# Patient Record
Sex: FEMALE | Race: WHITE | NOT HISPANIC OR LATINO | Employment: UNEMPLOYED | URBAN - METROPOLITAN AREA
[De-identification: names, ages, dates, MRNs, and addresses within clinical notes are randomized per-mention and may not be internally consistent; named-entity substitution may affect disease eponyms.]

---

## 2017-04-04 ENCOUNTER — APPOINTMENT (OUTPATIENT)
Dept: GENERAL RADIOLOGY | Facility: CLINIC | Age: 37
End: 2017-04-04
Attending: FAMILY MEDICINE
Payer: COMMERCIAL

## 2017-04-04 ENCOUNTER — HOSPITAL ENCOUNTER (EMERGENCY)
Facility: CLINIC | Age: 37
Discharge: HOME | End: 2017-04-04
Attending: FAMILY MEDICINE | Admitting: FAMILY MEDICINE
Payer: COMMERCIAL

## 2017-04-04 ENCOUNTER — OFFICE VISIT (OUTPATIENT)
Dept: FAMILY MEDICINE | Facility: CLINIC | Age: 37
End: 2017-04-04
Payer: COMMERCIAL

## 2017-04-04 VITALS
WEIGHT: 144 LBS | DIASTOLIC BLOOD PRESSURE: 86 MMHG | HEART RATE: 86 BPM | SYSTOLIC BLOOD PRESSURE: 127 MMHG | OXYGEN SATURATION: 98 % | TEMPERATURE: 98.4 F | RESPIRATION RATE: 20 BRPM | BODY MASS INDEX: 24.59 KG/M2 | HEIGHT: 64 IN

## 2017-04-04 VITALS
HEART RATE: 92 BPM | BODY MASS INDEX: 24.52 KG/M2 | SYSTOLIC BLOOD PRESSURE: 123 MMHG | DIASTOLIC BLOOD PRESSURE: 70 MMHG | WEIGHT: 143.6 LBS | HEIGHT: 64 IN | TEMPERATURE: 97.4 F

## 2017-04-04 DIAGNOSIS — E03.9 HYPOTHYROIDISM, UNSPECIFIED TYPE: ICD-10-CM

## 2017-04-04 DIAGNOSIS — R11.0 NAUSEA: ICD-10-CM

## 2017-04-04 DIAGNOSIS — E86.0 DEHYDRATION: ICD-10-CM

## 2017-04-04 DIAGNOSIS — K52.9 GASTROENTERITIS: Primary | ICD-10-CM

## 2017-04-04 DIAGNOSIS — N39.0 URINARY TRACT INFECTION WITHOUT HEMATURIA, SITE UNSPECIFIED: ICD-10-CM

## 2017-04-04 LAB
ALBUMIN SERPL-MCNC: 3.8 G/DL (ref 3.4–5)
ALBUMIN UR-MCNC: NEGATIVE MG/DL
ALP SERPL-CCNC: 81 U/L (ref 40–150)
ALT SERPL W P-5'-P-CCNC: 43 U/L (ref 0–50)
ANION GAP SERPL CALCULATED.3IONS-SCNC: 7 MMOL/L (ref 3–14)
APPEARANCE UR: ABNORMAL
AST SERPL W P-5'-P-CCNC: 29 U/L (ref 0–45)
BACTERIA #/AREA URNS HPF: ABNORMAL /HPF
BASOPHILS # BLD AUTO: 0 10E9/L (ref 0–0.2)
BASOPHILS NFR BLD AUTO: 0.8 %
BILIRUB SERPL-MCNC: 0.6 MG/DL (ref 0.2–1.3)
BILIRUB UR QL STRIP: NEGATIVE
BUN SERPL-MCNC: 9 MG/DL (ref 7–30)
CALCIUM SERPL-MCNC: 8.6 MG/DL (ref 8.5–10.1)
CHLORIDE SERPL-SCNC: 105 MMOL/L (ref 94–109)
CO2 SERPL-SCNC: 29 MMOL/L (ref 20–32)
COLOR UR AUTO: ABNORMAL
CREAT SERPL-MCNC: 0.7 MG/DL (ref 0.52–1.04)
DIFFERENTIAL METHOD BLD: ABNORMAL
EOSINOPHIL # BLD AUTO: 0 10E9/L (ref 0–0.7)
EOSINOPHIL NFR BLD AUTO: 0.6 %
ERYTHROCYTE [DISTWIDTH] IN BLOOD BY AUTOMATED COUNT: 12.5 % (ref 10–15)
GFR SERPL CREATININE-BSD FRML MDRD: NORMAL ML/MIN/1.7M2
GLUCOSE SERPL-MCNC: 85 MG/DL (ref 70–99)
GLUCOSE UR STRIP-MCNC: NEGATIVE MG/DL
HCG UR QL: NEGATIVE
HCT VFR BLD AUTO: 42.7 % (ref 35–47)
HGB BLD-MCNC: 14.7 G/DL (ref 11.7–15.7)
HGB UR QL STRIP: NEGATIVE
IMM GRANULOCYTES # BLD: 0 10E9/L (ref 0–0.4)
IMM GRANULOCYTES NFR BLD: 0.3 %
KETONES UR STRIP-MCNC: NEGATIVE MG/DL
LEUKOCYTE ESTERASE UR QL STRIP: ABNORMAL
LIPASE SERPL-CCNC: 143 U/L (ref 73–393)
LYMPHOCYTES # BLD AUTO: 1 10E9/L (ref 0.8–5.3)
LYMPHOCYTES NFR BLD AUTO: 27 %
MCH RBC QN AUTO: 28.7 PG (ref 26.5–33)
MCHC RBC AUTO-ENTMCNC: 34.4 G/DL (ref 31.5–36.5)
MCV RBC AUTO: 83 FL (ref 78–100)
MONOCYTES # BLD AUTO: 0.6 10E9/L (ref 0–1.3)
MONOCYTES NFR BLD AUTO: 16.1 %
MUCOUS THREADS #/AREA URNS LPF: PRESENT /LPF
NEUTROPHILS # BLD AUTO: 2 10E9/L (ref 1.6–8.3)
NEUTROPHILS NFR BLD AUTO: 55.2 %
NITRATE UR QL: NEGATIVE
PH UR STRIP: 5.5 PH (ref 5–7)
PLATELET # BLD AUTO: 141 10E9/L (ref 150–450)
POTASSIUM SERPL-SCNC: 4.2 MMOL/L (ref 3.4–5.3)
PROT SERPL-MCNC: 7.7 G/DL (ref 6.8–8.8)
RBC # BLD AUTO: 5.13 10E12/L (ref 3.8–5.2)
RBC #/AREA URNS AUTO: 3 /HPF (ref 0–2)
SODIUM SERPL-SCNC: 141 MMOL/L (ref 133–144)
SP GR UR STRIP: 1.01 (ref 1–1.03)
SQUAMOUS #/AREA URNS AUTO: 86 /HPF (ref 0–1)
TSH SERPL DL<=0.05 MIU/L-ACNC: 8.43 MU/L (ref 0.4–4)
URN SPEC COLLECT METH UR: ABNORMAL
UROBILINOGEN UR STRIP-MCNC: NORMAL MG/DL (ref 0–2)
WBC # BLD AUTO: 3.6 10E9/L (ref 4–11)
WBC #/AREA URNS AUTO: 18 /HPF (ref 0–2)

## 2017-04-04 PROCEDURE — 84443 ASSAY THYROID STIM HORMONE: CPT | Performed by: FAMILY MEDICINE

## 2017-04-04 PROCEDURE — 99213 OFFICE O/P EST LOW 20 MIN: CPT | Performed by: FAMILY MEDICINE

## 2017-04-04 PROCEDURE — 99284 EMERGENCY DEPT VISIT MOD MDM: CPT | Mod: 25

## 2017-04-04 PROCEDURE — 25000125 ZZHC RX 250: Performed by: EMERGENCY MEDICINE

## 2017-04-04 PROCEDURE — 96361 HYDRATE IV INFUSION ADD-ON: CPT

## 2017-04-04 PROCEDURE — 81025 URINE PREGNANCY TEST: CPT | Performed by: FAMILY MEDICINE

## 2017-04-04 PROCEDURE — 85025 COMPLETE CBC W/AUTO DIFF WBC: CPT | Performed by: FAMILY MEDICINE

## 2017-04-04 PROCEDURE — 71020 XR CHEST 2 VW: CPT

## 2017-04-04 PROCEDURE — 87086 URINE CULTURE/COLONY COUNT: CPT | Performed by: FAMILY MEDICINE

## 2017-04-04 PROCEDURE — 99284 EMERGENCY DEPT VISIT MOD MDM: CPT | Performed by: FAMILY MEDICINE

## 2017-04-04 PROCEDURE — 83690 ASSAY OF LIPASE: CPT | Performed by: FAMILY MEDICINE

## 2017-04-04 PROCEDURE — 81001 URINALYSIS AUTO W/SCOPE: CPT | Performed by: FAMILY MEDICINE

## 2017-04-04 PROCEDURE — 25000128 H RX IP 250 OP 636: Performed by: FAMILY MEDICINE

## 2017-04-04 PROCEDURE — 96360 HYDRATION IV INFUSION INIT: CPT

## 2017-04-04 PROCEDURE — 80053 COMPREHEN METABOLIC PANEL: CPT | Performed by: FAMILY MEDICINE

## 2017-04-04 RX ORDER — SULFAMETHOXAZOLE/TRIMETHOPRIM 800-160 MG
1 TABLET ORAL 2 TIMES DAILY
Qty: 6 TABLET | Refills: 0 | Status: SHIPPED | OUTPATIENT
Start: 2017-04-04 | End: 2017-04-08

## 2017-04-04 RX ORDER — ONDANSETRON 4 MG/1
4 TABLET, ORALLY DISINTEGRATING ORAL EVERY 8 HOURS PRN
Qty: 10 TABLET | Refills: 0 | Status: SHIPPED | OUTPATIENT
Start: 2017-04-04 | End: 2017-05-18

## 2017-04-04 RX ORDER — ONDANSETRON 4 MG/1
4 TABLET, ORALLY DISINTEGRATING ORAL ONCE
Status: COMPLETED | OUTPATIENT
Start: 2017-04-04 | End: 2017-04-04

## 2017-04-04 RX ORDER — SODIUM CHLORIDE 9 MG/ML
1000 INJECTION, SOLUTION INTRAVENOUS CONTINUOUS
Status: DISCONTINUED | OUTPATIENT
Start: 2017-04-04 | End: 2017-04-04 | Stop reason: HOSPADM

## 2017-04-04 RX ADMIN — SODIUM CHLORIDE 1000 ML: 9 INJECTION, SOLUTION INTRAVENOUS at 12:01

## 2017-04-04 RX ADMIN — ONDANSETRON 4 MG: 4 TABLET, ORALLY DISINTEGRATING ORAL at 10:24

## 2017-04-04 ASSESSMENT — ENCOUNTER SYMPTOMS
APPETITE CHANGE: 1
NAUSEA: 1
HEADACHES: 1

## 2017-04-04 NOTE — ED AVS SNAPSHOT
[facility] Emergency Department    [address] [address]    Phone:  [phone]    Fax:  [phone]                                       Kimberley Choe   MRN: [id number]    Department:  [facility] Emergency Department   Date of Visit:  4/4/2017           After Visit Summary Signature Page     I have received my discharge instructions, and my questions have been answered. I have discussed any challenges I see with this plan with the nurse or doctor.    ..........................................................................................................................................  Patient/Patient Representative Signature      ..........................................................................................................................................  Patient Representative Print Name and Relationship to Patient    ..................................................               ................................................  Date                                            Time    ..........................................................................................................................................  Reviewed by Signature/Title    ...................................................              ..............................................  Date                                                            Time

## 2017-04-04 NOTE — PROGRESS NOTES
a  SUBJECTIVE:                                                    Kimberley Choe is 36 year old female   Chief Complaint   Patient presents with     URI     sick for 1 week     ENT Symptoms             Symptoms: cc Present Absent Comment   Fever/Chills   x    Fatigue  x     Muscle Aches  x     Eye Irritation  x  Dry eyes   Sneezing   x    Nasal Sha/Drg   x    Sinus Pressure/Pain  x     Loss of smell   x    Dental pain   x    Sore Throat  x  Last week   Swollen Glands   x    Ear Pain/Fullness   x    Cough   x    Wheeze   x    Chest Pain   x    Shortness of breath   x    Rash   x    Other x x  Headache and nauseous     Symptom duration:  7 days   Symptom severity:  moderate   Treatments tried:  none   Contacts:  children were sick last week         Problem list and histories reviewed & adjusted, as indicated.  Additional history: nausea so severe cannot drink or eat, managed only small glass of water yesterday.  Feel like I am crashing.  No diarrhea.    Patient Active Problem List   Diagnosis     Hypothyroidism     Undiagnosed cardiac murmurs     Anemia     Hip pain     CARDIOVASCULAR SCREENING; LDL GOAL LESS THAN 160     Celiac disease     Joint pains     Fatty liver disease, nonalcoholic     Past Surgical History:   Procedure Laterality Date     C HIP ARTHROSCOPY, DX  6/08    (R) Anterior superior labral debridement.      ESOPHAGOSCOPY, GASTROSCOPY, DUODENOSCOPY (EGD), COMBINED  11/1/2010    EGD     THYROIDECTOMY   2002    S/p thyroidectomy 2002       Social History   Substance Use Topics     Smoking status: Never Smoker     Smokeless tobacco: Never Used     Alcohol use Yes      Comment: occasional- been two years     Family History   Problem Relation Age of Onset     DIABETES Maternal Grandmother      Alcohol/Drug Maternal Grandmother      GASTROINTESTINAL DISEASE Paternal Grandmother      IBS     Hypertension Paternal Grandmother      CEREBROVASCULAR DISEASE Paternal Grandmother      Alzheimer Disease Paternal  Grandmother      Hypertension Father      Arthritis Father      HEART DISEASE Father      cardimyopthy age 60, arrythmias     Lipids Father      Neurologic Disorder Sister      brain tumor     GASTROINTESTINAL DISEASE Mother      celiac     C.A.D. No family hx of      Breast Cancer No family hx of      Cancer - colorectal No family hx of          Current Outpatient Prescriptions   Medication Sig Dispense Refill     levothyroxine (SYNTHROID, LEVOTHROID) 112 MCG tablet Take 1 tablet (112 mcg) by mouth daily 90 tablet 3     Allergies   Allergen Reactions     Gluten Other (See Comments)     Celiac disease     Macrobid [Nitrofuran Derivatives] Nausea and Vomiting     Zithromax [Azithromycin Dihydrate] Nausea and Vomiting     Recent Labs   Lab Test  03/10/16   1130  04/07/15   1046  04/04/14   0907  03/11/14   0857   07/18/13   0957   04/26/12   0910   LDL   --    --    --    --    --   115   --   90   HDL   --    --    --    --    --   37*   --   38*   TRIG   --    --    --    --    --   133   --   120   ALT  24  18   --   34   < >  197*   < >   --    CR  0.67  0.66   --   0.72   < >   --    < >   --    GFRESTIMATED  >90  Non  GFR Calc    >90  Non  GFR Calc     --   >90   < >   --    < >   --    GFRESTBLACK  >90   GFR Calc    >90   GFR Calc     --   >90   < >   --    < >   --    POTASSIUM  3.4  4.4   --   4.2   < >   --    < >   --    TSH  1.87   --   0.08*   --    < >   --    < >  0.02*    < > = values in this interval not displayed.      BP Readings from Last 3 Encounters:   04/04/17 123/70   11/22/16 125/76   03/10/16 111/71    Wt Readings from Last 3 Encounters:   04/04/17 143 lb 9.6 oz (65.1 kg)   11/22/16 141 lb (64 kg)   03/10/16 136 lb 12.8 oz (62.1 kg)         ROS:  Constitutional, HEENT, cardiovascular, pulmonary, gi and gu systems are negative, except as otherwise noted.    OBJECTIVE:                                                    /70   "Pulse 92  Temp 97.4  F (36.3  C) (Tympanic)  Ht 5' 4\" (1.626 m)  Wt 143 lb 9.6 oz (65.1 kg)  BMI 24.65 kg/m2  GENERAL APPEARANCE ADULT: alert, appears ill, cooperative, tired appearing  HENT: right TM abnormal, dull, left TM abnormal, dull, oral mucous membranes dry, tongue dry, throat/mouth:mild erythema  RESP: lungs clear to auscultation   CV: normal rate, regular rhythm, no murmur or gallop  Diagnostic Test Results:  none      ASSESSMENT/PLAN:                                                    1. Gastroenteritis  2. Dehydration  Poor fluid intake for 3 days, will go to ED for IVF, nausea meds.      Kirsten Randall MD  [facility]      "

## 2017-04-04 NOTE — NURSING NOTE
"Chief Complaint   Patient presents with     URI     sick for 1 week       Initial /70  Pulse 92  Temp 97.4  F (36.3  C) (Tympanic)  Ht 5' 4\" (1.626 m)  Wt 143 lb 9.6 oz (65.1 kg)  BMI 24.65 kg/m2 Estimated body mass index is 24.65 kg/(m^2) as calculated from the following:    Height as of this encounter: 5' 4\" (1.626 m).    Weight as of this encounter: 143 lb 9.6 oz (65.1 kg).  Medication Reconciliation: complete  "

## 2017-04-04 NOTE — ED PROVIDER NOTES
Called by Dr. Randall.  Kimberley Choe is a 36 year old female with MEHTA in clinic for recent URI and not N/V dehydration.  coming to ED         Shaq Olivia MD  04/04/17 [id number]       Shaq Olivia MD  04/11/17 [id number]

## 2017-04-04 NOTE — PATIENT INSTRUCTIONS
Thank you for choosing [facility].  You may be receiving a survey in the mail from [employer]anqu[employer] regarding your visit today.  Please take a few minutes to complete and return the survey to let us know how we are doing.      If you have questions or concerns, please contact us via [employer] or you can contact your care team at [phone].    Our Clinic hours are:  Monday 6:40 am  to 7:00 pm  Tuesday -Friday 6:40 am to 5:00 pm    The [place] outpatient lab hours are:  Monday - Friday 6:10 am to 4:45 pm  Saturdays 7:00 am to 11:00 am  Appointments are required, call [phone]    If you have clinical questions after hours or would like to schedule an appointment,  call the clinic at [phone].

## 2017-04-04 NOTE — DISCHARGE INSTRUCTIONS
ICD-10-CM    1. Hypothyroidism, unspecified type E03.9     discuss increase of synthroid with Dr. Randall   2. Nausea R11.0     take zofran orally everyu 6 hours as needed for nausea   3. Dehydration E86.0    4. Urinary tract infection without hematuria, site unspecified N39.0     Take septra twice daily for 3 days. await urine culture. follow-up for vaginal symptoms as some of the changes on urine appear to be from vaginal cells.           Dehydration (Adult)  Dehydration occurs when your body loses too much fluid. This may be the result of prolonged vomiting or diarrhea, excessive sweating, or a high fever. It may also happen if you don t drink enough fluid when you re sick or out in the heat. Misuse of diuretics (water pills) can also be a cause.  Symptoms include thirst and decreased urine output. You may also feel dizzy, weak, fatigued, or very drowsy. The diet described below is usually enough to treat dehydration. In some cases, you may need medicine.  Home care    Drink at least 12 8-ounce glasses of fluid every day to resolve the dehydration. Fluid may include water; orange juice; lemonade; apple, grape, or cranberry juice; clear fruit drinks; electrolyte replacement and sports drinks; and teas and coffee without caffeine. If you have been diagnosed with a kidney disease, ask your doctor how much and what types of fluids you should drink to prevent dehydration. If you have kidney disease, fluid can build up in the body. This can be dangerous to your health.     If you have a fever, muscle aches, or a headache as a result of a cold or flu, you may take acetaminophen or ibuprofen, unless another medicine was prescribed. If you have chronic liver or kidney disease, or have ever had a stomach ulcer or gastrointestinal bleeding, talk with your health care provider before using these medicines. Don't take aspirin if you are younger than 18 and have a fever. Aspirin raises the chance for severe liver  injury.  Follow-up care  Follow up with your health care provider, or as advised.  When to seek medical advice  Call your health care provider right away if any of these occur:    Continued vomiting    Frequent diarrhea (more than 5 times a day); blood (red or black color) or mucus in diarrhea    Blood in vomit or stool    Swollen abdomen or increasing abdominal pain    Weakness, dizziness, or fainting    Unusual drowsiness or confusion    Reduced urine output or extreme thirst    Fever of 100.4 F (34 C) or higher     4743-1231 [employer]. [address] [place] [address]. All rights reserved. This information is not intended as a substitute for professional medical care. Always follow your healthcare professional's instructions.          Understanding Urinary Tract Infections (UTIs)  Most UTIs are caused by bacteria, although they may also be caused by viruses or fungi. Bacteria from the bowel are the most common source of infection. The infection may begin because of any of the following:    Sexual activity. During sex, germs can travel from the penis, vagina, or rectum into the urethra.     Germs on the skin outside the rectum may travel into the urethra. This is more common in women since the rectum and urethra are closer to each other than in men. Wiping from front to back after using the toilet and keeping the area clean can help prevent germs from getting to the urethra.    Blockage of urine flow through the urinary tract. If urine sits too long, germs may begin to grow out of control.      Parts of the urinary tract  The infection can occur in any part of the urinary tract.    The kidneys collect and store urine.    The ureters carry urine from the kidneys to the bladder.    The bladder holds urine until you are ready to let it out.    The urethra carries urine from the bladder out of the body. It is shorter in women, so bacteria can move through it more easily. The urethra is longer  in men, so a UTI is less likely to reach the bladder or kidneys in men.    2829-2404 [employer]. [address] [place] [address], [place] [address]. All rights reserved. This information is not intended as a substitute for professional medical care. Always follow your healthcare professional's instructions.          Hypothyroidism       You have hypothyroidism. This means your thyroid gland is not making enough thyroid hormone. This hormone is vital to body growth and metabolism. If you don t make enough, many body processes slow down. This can cause symptoms throughout the body. Hypothyroidism can range from mild to severe. The most severe form is called myxedema.  There are a number of causes of hypothyroidism. A common cause is Hashimoto s disease. This disease causes the body s own immune system to attack the thyroid gland. When you have certain treatments, such as surgery to remove the thyroid gland, this can also cause hypothyroidism.  Symptoms of hypothyroidism can include:    Fatigue    Trouble concentrating or thinking clearly; forgetfulness    Dry skin    Hair loss    Weight gain    Low tolerance to cold    Constipation    Depression    Personality changes    Tingling or prickling of the hands or feet    Heavy, absent, or irregular periods (women only)  Older adults may sometimes have other symptoms. These can include:    Muscle aches and weakness    Confusion    Incontinence (unable to control urine or stool)    Trouble moving around    Falling  Treatment for hypothyroidism involves taking thyroid hormone pills daily. These pills replace the hormone your thyroid doesn t make. You will likely need to take a daily pill for the rest of your life. Tips for taking this medicine are given below.  Home care  Tips for taking your medicine    Take your thyroid hormone pills as prescribed by your healthcare provider. This is most often 1 pill a day on an empty stomach. Use a pillbox labeled with the days of  the week. This will help you remember to take your pill each day.    Don t take products that contain iron and calcium or antacids within 4 hours of taking your thyroid hormone pills.    Don t take other medicines with your thyroid hormone pill without checking with your provider first.    Tell your provider if you have any side effects from your medicines that bother you.    Never change the dosage or stop taking your thyroid pills without talking to your provider first.  General care    Always talk with your provider before trying other medicines or treatments for your thyroid problem.    If you see other healthcare providers, be sure to let them know about your thyroid problem.  Follow-up care  See your healthcare provider for checkups as advised. You may need regular tests to check the level of thyroid hormone in your blood.  When to seek medical advice  Call your healthcare provider right away if any of these occur:    New symptoms develop    Symptoms return, continue, or worsen even after treatment    Extreme fatigue    Puffy hands, face, or feet    Fast or irregular heartbeat    Confusion  Call 911  Call 911 right away if any of these occur:    Fainting    Chest pain    Shortness of breath or trouble breathing    8190-6522 [employer]. [address] [place] [address], [place] [address]. All rights reserved. This information is not intended as a substitute for professional medical care. Always follow your healthcare professional's instructions.

## 2017-04-04 NOTE — ED PROVIDER NOTES
History     Chief Complaint   Patient presents with     Nausea     nausea started yesterday  unable to tolerate headache and nausea    has 3 small children     Headache     patient has had flu like symptoms for a week headache for on week  no improvement       HPI  Kimberley Choe is a 36 year old female with a history of hypothyroidism and celiac disease who presents with a headache. Last week the patient had upper respiratory symptoms that include: cough, sinus pressure, ear pain, sore throat, facial pain, eye pain, and muscle aches. Now she presents with nausea and a decrease appetite and smell. She said her sense of taste has been off that past few days as well.  Her  had to drive yesterday because her nausea was so severe. The patient states she feels dry. Her children have been sick with influenza. No history of acid reflux.       Past Medical History:   Diagnosis Date     Anemia, unspecified     with pregnancy 6/06     Dysmenorrhea 2/1/2012     GERD (gastroesophageal reflux disease) 1/30/2014     Papanicolaou smear of cervix with low grade squamous intraepithelial lesion (LGSIL) 7/09    Colpo negative     Right hip labral tear 8/24/2006     Unspecified closed fracture of ankle     Left foot     Unspecified disorder of thyroid 10/20/2002    Thyroid disease, goiter nodules, S/p thyroidectomy 2002       Past Surgical History:   Procedure Laterality Date     C HIP ARTHROSCOPY, DX  6/08    (R) Anterior superior labral debridement.      ESOPHAGOSCOPY, GASTROSCOPY, DUODENOSCOPY (EGD), COMBINED  11/1/2010    EGD     THYROIDECTOMY   2002    S/p thyroidectomy 2002       Social History   Substance Use Topics     Smoking status: Never Smoker     Smokeless tobacco: Never Used     Alcohol use Yes      Comment: occasional- been two years        Allergies   Allergen Reactions     Gluten Other (See Comments)     Celiac disease     Macrobid [Nitrofuran Derivatives] Nausea and Vomiting     Zithromax [Azithromycin  "Dihydrate] Nausea and Vomiting     I have reviewed the Medications, Allergies, Past Medical and Surgical History, and Social History in the Epic system.    Review of Systems   Constitutional: Positive for appetite change (decrease).   Gastrointestinal: Positive for nausea.   Neurological: Positive for headaches.     ROS:  No fever chills or sweats  No sore throat or ear pain  No chest pain, palpitations or shortness of breath  No abdominal pain, vomiting, diarrhea, constipation or blood in the stool or black tarry stools  No dysuria, urgency,  frequency or hematuria  No new rashes  No headaches or vision change  No enlarged lymph nodes  Review of systems otherwise negative     Physical Exam   BP: 127/86  Pulse: 86  Temp: 98.4  F (36.9  C)  Resp: 20  Height: 162.6 cm (5' 4\")  Weight: 65.3 kg (144 lb)  SpO2: 98 %    Physical Exam   HENT:   Right Ear: Tympanic membrane normal.   Left Ear: Tympanic membrane normal.   Mouth/Throat: Oropharynx is clear and moist and mucous membranes are normal.   Neck: No thyromegaly present.   Cardiovascular: Normal rate, regular rhythm and normal heart sounds.    No murmur heard.  Pulmonary/Chest: Effort normal and breath sounds normal. No respiratory distress. She has no wheezes. She has no rales.   Abdominal: Soft. Bowel sounds are normal. She exhibits no distension. There is no tenderness. There is no rebound and no guarding.   Musculoskeletal: She exhibits no edema.   Skin: No rash noted. No cyanosis.     ED Course     ED Course     Procedures             Critical Care time:  none                  Results for orders placed or performed during the hospital encounter of 04/04/17   XR Chest 2 Views    Narrative    XR CHEST 2 VW 4/4/2017 12:22 PM    HISTORY: Recent influenza. Nausea.    COMPARISON: 11/22/2016.      Impression    IMPRESSION: 2 views of the chest show a no acute or active  cardiopulmonary disease.     GABRIEL NAVA MD   Lipase   Result Value Ref Range    Lipase 143 73 - " 393 U/L   CBC with platelets differential   Result Value Ref Range    WBC 3.6 (L) 4.0 - 11.0 10e9/L    RBC Count 5.13 3.8 - 5.2 10e12/L    Hemoglobin 14.7 11.7 - 15.7 g/dL    Hematocrit 42.7 35.0 - 47.0 %    MCV 83 78 - 100 fl    MCH 28.7 26.5 - 33.0 pg    MCHC 34.4 31.5 - 36.5 g/dL    RDW 12.5 10.0 - 15.0 %    Platelet Count 141 (L) 150 - 450 10e9/L    Diff Method Automated Method     % Neutrophils 55.2 %    % Lymphocytes 27.0 %    % Monocytes 16.1 %    % Eosinophils 0.6 %    % Basophils 0.8 %    % Immature Granulocytes 0.3 %    Absolute Neutrophil 2.0 1.6 - 8.3 10e9/L    Absolute Lymphocytes 1.0 0.8 - 5.3 10e9/L    Absolute Monocytes 0.6 0.0 - 1.3 10e9/L    Absolute Eosinophils 0.0 0.0 - 0.7 10e9/L    Absolute Basophils 0.0 0.0 - 0.2 10e9/L    Abs Immature Granulocytes 0.0 0 - 0.4 10e9/L   Comprehensive metabolic panel   Result Value Ref Range    Sodium 141 133 - 144 mmol/L    Potassium 4.2 3.4 - 5.3 mmol/L    Chloride 105 94 - 109 mmol/L    Carbon Dioxide 29 20 - 32 mmol/L    Anion Gap 7 3 - 14 mmol/L    Glucose 85 70 - 99 mg/dL    Urea Nitrogen 9 7 - 30 mg/dL    Creatinine 0.70 0.52 - 1.04 mg/dL    GFR Estimate >90  Non  GFR Calc   >60 mL/min/1.7m2    GFR Estimate If Black >90   GFR Calc   >60 mL/min/1.7m2    Calcium 8.6 8.5 - 10.1 mg/dL    Bilirubin Total 0.6 0.2 - 1.3 mg/dL    Albumin 3.8 3.4 - 5.0 g/dL    Protein Total 7.7 6.8 - 8.8 g/dL    Alkaline Phosphatase 81 40 - 150 U/L    ALT 43 0 - 50 U/L    AST 29 0 - 45 U/L   UA with Microscopic   Result Value Ref Range    Color Urine Light Yellow     Appearance Urine Cloudy     Glucose Urine Negative NEG mg/dL    Bilirubin Urine Negative NEG    Ketones Urine Negative NEG mg/dL    Specific Gravity Urine 1.008 1.003 - 1.035    Blood Urine Negative NEG    pH Urine 5.5 5.0 - 7.0 pH    Protein Albumin Urine Negative NEG mg/dL    Urobilinogen mg/dL Normal 0.0 - 2.0 mg/dL    Nitrite Urine Negative NEG    Leukocyte Esterase Urine Large (A)  NEG    Source Midstream Urine     WBC Urine 18 (H) 0 - 2 /HPF    RBC Urine 3 (H) 0 - 2 /HPF    Bacteria Urine Few (A) NEG /HPF    Squamous Epithelial /HPF Urine 86 (H) 0 - 1 /HPF    Mucous Urine Present (A) NEG /LPF   TSH   Result Value Ref Range    TSH 8.43 (H) 0.40 - 4.00 mU/L   HCG qualitative urine   Result Value Ref Range    HCG Qual Urine Negative NEG   Urine Culture   Result Value Ref Range    Specimen Description Midstream Urine     Culture Micro       50,000 to 100,000 colonies/mL Multiple species present, probable perineal   contamination.      Micro Report Status FINAL 04/06/2017          No results found for this or any previous visit (from the past 24 hour(s)).    Medications   ondansetron (ZOFRAN-ODT) ODT tab 4 mg (4 mg Oral Given 4/4/17 1024)   0.9% sodium chloride BOLUS (0 mLs Intravenous Stopped 4/4/17 1353)     11:32 AM patient assessed.     Assessments & Plan (with Medical Decision Making)     MDM: Kimberley Choe is a 36 year old female Who presented with possible gastroenteritis like symptoms or possibly due to  headache With associated nausea vomiting. Does have findings of UTI and And will treat with oral Septra. Rehydrated with IV fluids. Did well after Zofran. Also discussed the abnormal TSH that is 8 and should discuss with her primary provider    I have reviewed the nursing notes.    I have reviewed the findings, diagnosis, plan and need for follow up with the patient.    New Prescriptions    No medications on file        Final diagnoses:   Hypothyroidism, unspecified type - discuss increase of synthroid with Dr. Randall   Nausea - take zofran orally everyu 6 hours as needed for nausea   Dehydration   Urinary tract infection without hematuria, site unspecified - Take septra twice daily for 3 days. await urine culture. follow-up for vaginal symptoms as some of the changes on urine appear to be from vaginal cells.     This document serves as a record of the services and decisions personally  performed and made by Shaq Olivia MD. It was created on HIS/HER behalf by Elena Brooks, a trained medical scribe. The creation of this document is based the provider's statements to the medical scribe.  Elena Brooks 11:32 AM 4/4/2017    Provider:   The information in this document, created by the medical scribe for me, accurately reflects the services I personally performed and the decisions made by me. I have reviewed and approved this document for accuracy prior to leaving the patient care area.  Shaq Olivia MD 11:32 AM 4/4/2017 4/4/2017   [facility] EMERGENCY DEPARTMENT     Shaq Olivia MD  04/11/17 1901

## 2017-04-04 NOTE — ED AVS SNAPSHOT
[facility] Emergency Department    [address] [facility] [address]    Phone:  [phone]    Fax:  [phone]                                       Kimberley Choe   MRN: [id number]    Department:  [facility] Emergency Department   Date of Visit:  4/4/2017           Patient Information     Date Of Birth          1980        Your diagnoses for this visit were:     Hypothyroidism, unspecified type discuss increase of synthroid with Dr. Randall    Nausea take zofran orally everyu 6 hours as needed for nausea    Dehydration     Urinary tract infection without hematuria, site unspecified Take septra twice daily for 3 days. await urine culture. follow-up for vaginal symptoms as some of the changes on urine appear to be from vaginal cells.       You were seen by Shaq Olivia MD.      Follow-up Information     Follow up with Yari Gonzalez APRN CNP.    Specialty:  Nurse Practitioner    Contact information:    [address] [address] [phone]          Follow up with [facility] Emergency Department.    Specialty:  EMERGENCY MEDICINE    Why:  As needed, If symptoms worsen    Contact information:    [address] [place] [address] [phone]    Additional information:    The medical center is located at   [address] [place] [address]. (between [address]   [address] in [place], four miles north   of [place]).        Discharge Instructions         ICD-10-CM    1. Hypothyroidism, unspecified type E03.9     discuss increase of synthroid with Dr. Randall   2. Nausea R11.0     take zofran orally everyu 6 hours as needed for nausea   3. Dehydration E86.0    4. Urinary tract infection without hematuria, site unspecified N39.0     Take septra twice daily for 3 days. await urine culture. follow-up for vaginal symptoms as some of the changes on urine appear to be from vaginal cells.           Dehydration (Adult)  Dehydration occurs when your body  loses too much fluid. This may be the result of prolonged vomiting or diarrhea, excessive sweating, or a high fever. It may also happen if you don t drink enough fluid when you re sick or out in the heat. Misuse of diuretics (water pills) can also be a cause.  Symptoms include thirst and decreased urine output. You may also feel dizzy, weak, fatigued, or very drowsy. The diet described below is usually enough to treat dehydration. In some cases, you may need medicine.  Home care    Drink at least 12 8-ounce glasses of fluid every day to resolve the dehydration. Fluid may include water; orange juice; lemonade; apple, grape, or cranberry juice; clear fruit drinks; electrolyte replacement and sports drinks; and teas and coffee without caffeine. If you have been diagnosed with a kidney disease, ask your doctor how much and what types of fluids you should drink to prevent dehydration. If you have kidney disease, fluid can build up in the body. This can be dangerous to your health.     If you have a fever, muscle aches, or a headache as a result of a cold or flu, you may take acetaminophen or ibuprofen, unless another medicine was prescribed. If you have chronic liver or kidney disease, or have ever had a stomach ulcer or gastrointestinal bleeding, talk with your health care provider before using these medicines. Don't take aspirin if you are younger than 18 and have a fever. Aspirin raises the chance for severe liver injury.  Follow-up care  Follow up with your health care provider, or as advised.  When to seek medical advice  Call your health care provider right away if any of these occur:    Continued vomiting    Frequent diarrhea (more than 5 times a day); blood (red or black color) or mucus in diarrhea    Blood in vomit or stool    Swollen abdomen or increasing abdominal pain    Weakness, dizziness, or fainting    Unusual drowsiness or confusion    Reduced urine output or extreme thirst    Fever of 100.4 F (34 C) or  higher     8756-9880 [employer]. [address] [place] [address]. All rights reserved. This information is not intended as a substitute for professional medical care. Always follow your healthcare professional's instructions.          Understanding Urinary Tract Infections (UTIs)  Most UTIs are caused by bacteria, although they may also be caused by viruses or fungi. Bacteria from the bowel are the most common source of infection. The infection may begin because of any of the following:    Sexual activity. During sex, germs can travel from the penis, vagina, or rectum into the urethra.     Germs on the skin outside the rectum may travel into the urethra. This is more common in women since the rectum and urethra are closer to each other than in men. Wiping from front to back after using the toilet and keeping the area clean can help prevent germs from getting to the urethra.    Blockage of urine flow through the urinary tract. If urine sits too long, germs may begin to grow out of control.      Parts of the urinary tract  The infection can occur in any part of the urinary tract.    The kidneys collect and store urine.    The ureters carry urine from the kidneys to the bladder.    The bladder holds urine until you are ready to let it out.    The urethra carries urine from the bladder out of the body. It is shorter in women, so bacteria can move through it more easily. The urethra is longer in men, so a UTI is less likely to reach the bladder or kidneys in men.    4368-7497 [employer]. [address] [place] [address]. All rights reserved. This information is not intended as a substitute for professional medical care. Always follow your healthcare professional's instructions.          Hypothyroidism       You have hypothyroidism. This means your thyroid gland is not making enough thyroid hormone. This hormone is vital to body growth and metabolism. If you don t make  enough, many body processes slow down. This can cause symptoms throughout the body. Hypothyroidism can range from mild to severe. The most severe form is called myxedema.  There are a number of causes of hypothyroidism. A common cause is Hashimoto s disease. This disease causes the body s own immune system to attack the thyroid gland. When you have certain treatments, such as surgery to remove the thyroid gland, this can also cause hypothyroidism.  Symptoms of hypothyroidism can include:    Fatigue    Trouble concentrating or thinking clearly; forgetfulness    Dry skin    Hair loss    Weight gain    Low tolerance to cold    Constipation    Depression    Personality changes    Tingling or prickling of the hands or feet    Heavy, absent, or irregular periods (women only)  Older adults may sometimes have other symptoms. These can include:    Muscle aches and weakness    Confusion    Incontinence (unable to control urine or stool)    Trouble moving around    Falling  Treatment for hypothyroidism involves taking thyroid hormone pills daily. These pills replace the hormone your thyroid doesn t make. You will likely need to take a daily pill for the rest of your life. Tips for taking this medicine are given below.  Home care  Tips for taking your medicine    Take your thyroid hormone pills as prescribed by your healthcare provider. This is most often 1 pill a day on an empty stomach. Use a pillbox labeled with the days of the week. This will help you remember to take your pill each day.    Don t take products that contain iron and calcium or antacids within 4 hours of taking your thyroid hormone pills.    Don t take other medicines with your thyroid hormone pill without checking with your provider first.    Tell your provider if you have any side effects from your medicines that bother you.    Never change the dosage or stop taking your thyroid pills without talking to your provider first.  General care    Always talk with  your provider before trying other medicines or treatments for your thyroid problem.    If you see other healthcare providers, be sure to let them know about your thyroid problem.  Follow-up care  See your healthcare provider for checkups as advised. You may need regular tests to check the level of thyroid hormone in your blood.  When to seek medical advice  Call your healthcare provider right away if any of these occur:    New symptoms develop    Symptoms return, continue, or worsen even after treatment    Extreme fatigue    Puffy hands, face, or feet    Fast or irregular heartbeat    Confusion  Call 911  Call 911 right away if any of these occur:    Fainting    Chest pain    Shortness of breath or trouble breathing    5096-1481 [employer]. [address] [place] [address], [place] [address]. All rights reserved. This information is not intended as a substitute for professional medical care. Always follow your healthcare professional's instructions.          Future Appointments        Provider Department Dept Phone Center    4/24/2017 2:30 PM Charisma Santacruz MD [facility] [phone] [facility]      24 Hour Appointment Hotline       To make an appointment at any [facility], call [phone] ([phone]). If you don't have a family doctor or clinic, we will help you find one. [facility] are conveniently located to serve the needs of you and your family.             Review of your medicines      START taking        Dose / Directions Last dose taken    ondansetron 4 MG ODT tab   Commonly known as:  ZOFRAN-ODT   Dose:  4 mg   Quantity:  10 tablet        Take 1 tablet (4 mg) by mouth every 8 hours as needed for nausea   Refills:  0        sulfamethoxazole-trimethoprim 800-160 MG per tablet   Commonly known as:  BACTRIM DS/SEPTRA DS   Dose:  1 tablet   Quantity:  6 tablet        Take 1 tablet by mouth 2 times daily for 3 days   Refills:  0          Our records show that you are taking the  medicines listed below. If these are incorrect, please call your family doctor or clinic.        Dose / Directions Last dose taken    levothyroxine 112 MCG tablet   Commonly known as:  SYNTHROID/LEVOTHROID   Dose:  112 mcg   Quantity:  90 tablet        Take 1 tablet (112 mcg) by mouth daily   Refills:  3        VITAMIN D (CHOLECALCIFEROL) PO   Dose:  1000 Units        Take 1,000 Units by mouth daily   Refills:  0                Prescriptions were sent or printed at these locations (2 Prescriptions)                   [facility] - [place], [place] - [address] [facility]   [address] [facility] [address]    Telephone:  [phone]   Fax:  [phone]   Hours:                  E-Prescribed (2 of 2)         ondansetron (ZOFRAN-ODT) 4 MG ODT tab               sulfamethoxazole-trimethoprim (BACTRIM DS/SEPTRA DS) 800-160 MG per tablet                Procedures and tests performed during your visit     CBC with platelets differential    Comprehensive metabolic panel    HCG qualitative urine    Lipase    TSH    UA with Microscopic    Urine Culture    XR Chest 2 Views      Orders Needing Specimen Collection     None      Pending Results     Date and Time Order Name Status Description    4/4/2017 1354 Urine Culture In process             Pending Culture Results     Date and Time Order Name Status Description    4/4/2017 1354 Urine Culture In process             Test Results From Your Hospital Stay        4/4/2017 12:25 PM      Component Results     Component Value Ref Range & Units Status    Lipase 143 73 - 393 U/L Final         4/4/2017 12:14 PM      Component Results     Component Value Ref Range & Units Status    WBC 3.6 (L) 4.0 - 11.0 10e9/L Final    RBC Count 5.13 3.8 - 5.2 10e12/L Final    Hemoglobin 14.7 11.7 - 15.7 g/dL Final    Hematocrit 42.7 35.0 - 47.0 % Final    MCV 83 78 - 100 fl Final    MCH 28.7 26.5 - 33.0 pg Final    MCHC 34.4 31.5 - 36.5 g/dL Final    RDW 12.5 10.0 - 15.0 % Final    Platelet  Count 141 (L) 150 - 450 10e9/L Final    Diff Method Automated Method  Final    % Neutrophils 55.2 % Final    % Lymphocytes 27.0 % Final    % Monocytes 16.1 % Final    % Eosinophils 0.6 % Final    % Basophils 0.8 % Final    % Immature Granulocytes 0.3 % Final    Absolute Neutrophil 2.0 1.6 - 8.3 10e9/L Final    Absolute Lymphocytes 1.0 0.8 - 5.3 10e9/L Final    Absolute Monocytes 0.6 0.0 - 1.3 10e9/L Final    Absolute Eosinophils 0.0 0.0 - 0.7 10e9/L Final    Absolute Basophils 0.0 0.0 - 0.2 10e9/L Final    Abs Immature Granulocytes 0.0 0 - 0.4 10e9/L Final         4/4/2017 12:29 PM      Component Results     Component Value Ref Range & Units Status    Sodium 141 133 - 144 mmol/L Final    Potassium 4.2 3.4 - 5.3 mmol/L Final    Chloride 105 94 - 109 mmol/L Final    Carbon Dioxide 29 20 - 32 mmol/L Final    Anion Gap 7 3 - 14 mmol/L Final    Glucose 85 70 - 99 mg/dL Final    Urea Nitrogen 9 7 - 30 mg/dL Final    Creatinine 0.70 0.52 - 1.04 mg/dL Final    GFR Estimate >90  Non  GFR Calc   >60 mL/min/1.7m2 Final    GFR Estimate If Black >90   GFR Calc   >60 mL/min/1.7m2 Final    Calcium 8.6 8.5 - 10.1 mg/dL Final    Bilirubin Total 0.6 0.2 - 1.3 mg/dL Final    Albumin 3.8 3.4 - 5.0 g/dL Final    Protein Total 7.7 6.8 - 8.8 g/dL Final    Alkaline Phosphatase 81 40 - 150 U/L Final    ALT 43 0 - 50 U/L Final    AST 29 0 - 45 U/L Final         4/4/2017 12:50 PM      Narrative     XR CHEST 2 VW 4/4/2017 12:22 PM    HISTORY: Recent influenza. Nausea.    COMPARISON: 11/22/2016.        Impression     IMPRESSION: 2 views of the chest show a no acute or active  cardiopulmonary disease.     GABRIEL NAVA MD         4/4/2017  1:12 PM      Component Results     Component Value Ref Range & Units Status    Color Urine Light Yellow  Final    Appearance Urine Cloudy  Final    Glucose Urine Negative NEG mg/dL Final    Bilirubin Urine Negative NEG Final    Ketones Urine Negative NEG mg/dL Final    Specific  Gravity Urine 1.008 1.003 - 1.035 Final    Blood Urine Negative NEG Final    pH Urine 5.5 5.0 - 7.0 pH Final    Protein Albumin Urine Negative NEG mg/dL Final    Urobilinogen mg/dL Normal 0.0 - 2.0 mg/dL Final    Nitrite Urine Negative NEG Final    Leukocyte Esterase Urine Large (A) NEG Final    Source Midstream Urine  Final    WBC Urine 18 (H) 0 - 2 /HPF Final    RBC Urine 3 (H) 0 - 2 /HPF Final    Bacteria Urine Few (A) NEG /HPF Final    Squamous Epithelial /HPF Urine 86 (H) 0 - 1 /HPF Final    Mucous Urine Present (A) NEG /LPF Final         4/4/2017 12:35 PM      Component Results     Component Value Ref Range & Units Status    TSH 8.43 (H) 0.40 - 4.00 mU/L Final         4/4/2017  1:10 PM      Component Results     Component Value Ref Range & Units Status    HCG Qual Urine Negative NEG Final         4/4/2017  2:10 PM                Thank you for choosing [facility]       Thank you for choosing [facility] for your care. Our goal is always to provide you with excellent care. Hearing back from our patients is one way we can continue to improve our services. Please take a few minutes to complete the written survey that you may receive in the mail after you visit with us. Thank you!        [email or web address] Information     [email or web address] gives you secure access to your electronic health record. If you see a primary care provider, you can also send messages to your care team and make appointments. If you have questions, please call your primary care clinic.  If you do not have a primary care provider, please call [phone] and they will assist you.        Care EveryWhere ID     This is your Care EveryWhere ID. This could be used by other organizations to access your [facility] medical records  [id number]        After Visit Summary       This is your record. Keep this with you and show to your community pharmacist(s) and doctor(s) at your next visit.

## 2017-04-06 LAB
BACTERIA SPEC CULT: NORMAL
MICRO REPORT STATUS: NORMAL
SPECIMEN SOURCE: NORMAL

## 2017-04-08 ENCOUNTER — APPOINTMENT (OUTPATIENT)
Dept: CT IMAGING | Facility: CLINIC | Age: 37
End: 2017-04-08
Attending: FAMILY MEDICINE
Payer: COMMERCIAL

## 2017-04-08 ENCOUNTER — ANESTHESIA EVENT (OUTPATIENT)
Dept: EMERGENCY MEDICINE | Facility: CLINIC | Age: 37
End: 2017-04-08
Payer: COMMERCIAL

## 2017-04-08 ENCOUNTER — ANESTHESIA (OUTPATIENT)
Dept: EMERGENCY MEDICINE | Facility: CLINIC | Age: 37
End: 2017-04-08
Payer: COMMERCIAL

## 2017-04-08 ENCOUNTER — HOSPITAL ENCOUNTER (EMERGENCY)
Facility: CLINIC | Age: 37
Discharge: HOME | End: 2017-04-08
Attending: FAMILY MEDICINE | Admitting: FAMILY MEDICINE
Payer: COMMERCIAL

## 2017-04-08 VITALS
TEMPERATURE: 97.7 F | HEART RATE: 68 BPM | OXYGEN SATURATION: 98 % | SYSTOLIC BLOOD PRESSURE: 102 MMHG | RESPIRATION RATE: 16 BRPM | DIASTOLIC BLOOD PRESSURE: 60 MMHG

## 2017-04-08 DIAGNOSIS — R51.9 ACUTE NONINTRACTABLE HEADACHE, UNSPECIFIED HEADACHE TYPE: ICD-10-CM

## 2017-04-08 LAB
ANION GAP SERPL CALCULATED.3IONS-SCNC: 7 MMOL/L (ref 3–14)
APPEARANCE CSF: CLEAR
BASOPHILS # BLD AUTO: 0 10E9/L (ref 0–0.2)
BASOPHILS NFR BLD AUTO: 0.7 %
BUN SERPL-MCNC: 10 MG/DL (ref 7–30)
CALCIUM SERPL-MCNC: 8.5 MG/DL (ref 8.5–10.1)
CHLORIDE SERPL-SCNC: 104 MMOL/L (ref 94–109)
CO2 SERPL-SCNC: 28 MMOL/L (ref 20–32)
COHGB MFR BLD: 1.5 % (ref 0–2)
COLOR CSF: COLORLESS
CREAT SERPL-MCNC: 0.72 MG/DL (ref 0.52–1.04)
CRP SERPL-MCNC: <2.9 MG/L (ref 0–8)
DIFFERENTIAL METHOD BLD: ABNORMAL
EOSINOPHIL # BLD AUTO: 0.1 10E9/L (ref 0–0.7)
EOSINOPHIL NFR BLD AUTO: 1.5 %
ERYTHROCYTE [DISTWIDTH] IN BLOOD BY AUTOMATED COUNT: 12.3 % (ref 10–15)
GFR SERPL CREATININE-BSD FRML MDRD: ABNORMAL ML/MIN/1.7M2
GLUCOSE CSF-MCNC: 61 MG/DL (ref 40–70)
GLUCOSE SERPL-MCNC: 100 MG/DL (ref 70–99)
GRAM STN SPEC: NORMAL
HCT VFR BLD AUTO: 44.3 % (ref 35–47)
HGB BLD-MCNC: 15.4 G/DL (ref 11.7–15.7)
IMM GRANULOCYTES # BLD: 0.1 10E9/L (ref 0–0.4)
IMM GRANULOCYTES NFR BLD: 1.5 %
LYMPHOCYTES # BLD AUTO: 1.2 10E9/L (ref 0.8–5.3)
LYMPHOCYTES NFR BLD AUTO: 20.3 %
Lab: NORMAL
MCH RBC QN AUTO: 28.6 PG (ref 26.5–33)
MCHC RBC AUTO-ENTMCNC: 34.8 G/DL (ref 31.5–36.5)
MCV RBC AUTO: 82 FL (ref 78–100)
MICRO REPORT STATUS: NORMAL
MONOCYTES # BLD AUTO: 0.5 10E9/L (ref 0–1.3)
MONOCYTES NFR BLD AUTO: 9.3 %
NEUTROPHILS # BLD AUTO: 3.9 10E9/L (ref 1.6–8.3)
NEUTROPHILS NFR BLD AUTO: 66.7 %
PLATELET # BLD AUTO: 189 10E9/L (ref 150–450)
POTASSIUM SERPL-SCNC: 3.9 MMOL/L (ref 3.4–5.3)
PROT CSF-MCNC: 37 MG/DL (ref 15–60)
RBC # BLD AUTO: 5.38 10E12/L (ref 3.8–5.2)
RBC # CSF MANUAL: 2 /UL (ref 0–2)
SODIUM SERPL-SCNC: 139 MMOL/L (ref 133–144)
SPECIMEN SOURCE: NORMAL
TUBE # CSF: 3 #
WBC # BLD AUTO: 5.8 10E9/L (ref 4–11)
WBC # CSF MANUAL: 1 /UL (ref 0–5)

## 2017-04-08 PROCEDURE — 62270 DX LMBR SPI PNXR: CPT

## 2017-04-08 PROCEDURE — 82945 GLUCOSE OTHER FLUID: CPT | Performed by: FAMILY MEDICINE

## 2017-04-08 PROCEDURE — 96361 HYDRATE IV INFUSION ADD-ON: CPT | Mod: 59

## 2017-04-08 PROCEDURE — 80048 BASIC METABOLIC PNL TOTAL CA: CPT | Performed by: FAMILY MEDICINE

## 2017-04-08 PROCEDURE — 96374 THER/PROPH/DIAG INJ IV PUSH: CPT

## 2017-04-08 PROCEDURE — 70450 CT HEAD/BRAIN W/O DYE: CPT

## 2017-04-08 PROCEDURE — 86140 C-REACTIVE PROTEIN: CPT | Performed by: FAMILY MEDICINE

## 2017-04-08 PROCEDURE — 25000128 H RX IP 250 OP 636: Performed by: FAMILY MEDICINE

## 2017-04-08 PROCEDURE — 99285 EMERGENCY DEPT VISIT HI MDM: CPT | Mod: 25 | Performed by: FAMILY MEDICINE

## 2017-04-08 PROCEDURE — 89050 BODY FLUID CELL COUNT: CPT | Performed by: FAMILY MEDICINE

## 2017-04-08 PROCEDURE — 96375 TX/PRO/DX INJ NEW DRUG ADDON: CPT

## 2017-04-08 PROCEDURE — 40000671 ZZH STATISTIC ANESTHESIA CASE

## 2017-04-08 PROCEDURE — 84157 ASSAY OF PROTEIN OTHER: CPT | Performed by: FAMILY MEDICINE

## 2017-04-08 PROCEDURE — 25000128 H RX IP 250 OP 636

## 2017-04-08 PROCEDURE — 62270 DX LMBR SPI PNXR: CPT | Performed by: NURSE ANESTHETIST, CERTIFIED REGISTERED

## 2017-04-08 PROCEDURE — 85025 COMPLETE CBC W/AUTO DIFF WBC: CPT | Performed by: FAMILY MEDICINE

## 2017-04-08 PROCEDURE — 87205 SMEAR GRAM STAIN: CPT | Performed by: FAMILY MEDICINE

## 2017-04-08 PROCEDURE — 82375 ASSAY CARBOXYHB QUANT: CPT | Performed by: FAMILY MEDICINE

## 2017-04-08 PROCEDURE — 25000125 ZZHC RX 250: Performed by: NURSE ANESTHETIST, CERTIFIED REGISTERED

## 2017-04-08 PROCEDURE — 99285 EMERGENCY DEPT VISIT HI MDM: CPT | Mod: 25

## 2017-04-08 PROCEDURE — 87070 CULTURE OTHR SPECIMN AEROBIC: CPT | Performed by: FAMILY MEDICINE

## 2017-04-08 RX ORDER — HYDROMORPHONE HYDROCHLORIDE 1 MG/ML
0.5 INJECTION, SOLUTION INTRAMUSCULAR; INTRAVENOUS; SUBCUTANEOUS
Status: DISCONTINUED | OUTPATIENT
Start: 2017-04-08 | End: 2017-04-08 | Stop reason: HOSPADM

## 2017-04-08 RX ORDER — OXYCODONE AND ACETAMINOPHEN 5; 325 MG/1; MG/1
1-2 TABLET ORAL EVERY 6 HOURS PRN
Qty: 10 TABLET | Refills: 0 | Status: SHIPPED | OUTPATIENT
Start: 2017-04-08 | End: 2017-04-10

## 2017-04-08 RX ORDER — ONDANSETRON 2 MG/ML
4 INJECTION INTRAMUSCULAR; INTRAVENOUS
Status: COMPLETED | OUTPATIENT
Start: 2017-04-08 | End: 2017-04-08

## 2017-04-08 RX ORDER — SODIUM CHLORIDE 9 MG/ML
1000 INJECTION, SOLUTION INTRAVENOUS CONTINUOUS
Status: DISCONTINUED | OUTPATIENT
Start: 2017-04-08 | End: 2017-04-08 | Stop reason: HOSPADM

## 2017-04-08 RX ORDER — ONDANSETRON 2 MG/ML
INJECTION INTRAMUSCULAR; INTRAVENOUS
Status: COMPLETED
Start: 2017-04-08 | End: 2017-04-08

## 2017-04-08 RX ORDER — ONDANSETRON 4 MG/1
4 TABLET, ORALLY DISINTEGRATING ORAL EVERY 8 HOURS PRN
Qty: 20 TABLET | Refills: 0 | Status: SHIPPED | OUTPATIENT
Start: 2017-04-08 | End: 2017-04-10

## 2017-04-08 RX ORDER — LIDOCAINE HYDROCHLORIDE 10 MG/ML
INJECTION, SOLUTION INFILTRATION; PERINEURAL PRN
Status: DISCONTINUED | OUTPATIENT
Start: 2017-04-08 | End: 2017-04-08

## 2017-04-08 RX ORDER — ONDANSETRON 2 MG/ML
4 INJECTION INTRAMUSCULAR; INTRAVENOUS ONCE
Status: DISCONTINUED | OUTPATIENT
Start: 2017-04-08 | End: 2017-04-08 | Stop reason: HOSPADM

## 2017-04-08 RX ADMIN — SODIUM CHLORIDE 1000 ML: 9 INJECTION, SOLUTION INTRAVENOUS at 10:04

## 2017-04-08 RX ADMIN — LIDOCAINE HYDROCHLORIDE 5 ML: 10 INJECTION, SOLUTION INFILTRATION; PERINEURAL at 11:26

## 2017-04-08 RX ADMIN — HYDROMORPHONE HYDROCHLORIDE 0.5 MG: 1 INJECTION, SOLUTION INTRAMUSCULAR; INTRAVENOUS; SUBCUTANEOUS at 10:07

## 2017-04-08 RX ADMIN — SODIUM CHLORIDE 1000 ML: 9 INJECTION, SOLUTION INTRAVENOUS at 14:00

## 2017-04-08 RX ADMIN — ONDANSETRON 4 MG: 2 INJECTION INTRAMUSCULAR; INTRAVENOUS at 10:05

## 2017-04-08 RX ADMIN — LIDOCAINE HYDROCHLORIDE 5 ML: 10 INJECTION, SOLUTION INFILTRATION; PERINEURAL at 11:45

## 2017-04-08 RX ADMIN — SODIUM CHLORIDE 1000 ML: 9 INJECTION, SOLUTION INTRAVENOUS at 11:25

## 2017-04-08 RX ADMIN — ONDANSETRON 4 MG: 2 INJECTION INTRAMUSCULAR; INTRAVENOUS at 15:06

## 2017-04-08 NOTE — DISCHARGE INSTRUCTIONS
"  Self-Care for Headaches  Most headaches aren't serious and can be relieved with self-care. But some headaches may be a sign of another health problem like eye trouble or high blood pressure. To find the best treatment, learn what kind of headaches you get. For tension headaches, self-care will usually help. To treat migraines, ask your healthcare provider for advice. It is also possible to get both tension and migraine headaches. Self-care involves relieving the pain and avoiding headache  triggers  if you can.    Ways to reduce pain and tension  Try these steps:    Apply a cold compress or ice pack to the pain site.    Drink fluids. If nausea makes it hard to drink, try sucking on ice.    Rest. Protect yourself from bright light and loud noises.    Calm your emotions by imagining a peaceful scene.    Massage tight neck, shoulder, and head muscles.    To relax muscles, soak in a hot bath or use a hot shower.  Use medicines  Aspirin or aspirin substitutes, such as ibuprofen and acetaminophen, can relieve headache. Remember: Never give aspirin to anyone 18 years old or younger because of the risk of developing Reye syndrome. Use pain medicines only when necessary.  Track your headaches  Keeping a headache diary can help you and your healthcare provider identify what's causing your headaches:    Note when each headache happens.    Identify your activities and the foods you've eaten 6 to 8 hours before the headache began.    Look for any trends or \"triggers.\"  Signs of tension headache  Any of the following can be signs:    Dull pain or feeling of pressure in a tight band around your head    Pain in your neck or shoulders    Headache without a definite beginning or end    Headache after an activity such as driving or working on a computer  Signs of migraine  Any of the following can be signs:    Throbbing pain on one or both sides of your head    Nausea or vomiting    Extreme sensitivity to light, sound, and " "smells    Bright spots, flashes, or other visual changes    Pain or nausea so severe that you can't continue your daily activities  Call your healthcare provider   If you have any of the following symptoms, contact your healthcare provider:    A headache that lingers after a recent injury or bump to the head.    A fever with a stiff neck or pain when you bend your head toward your chest.    A headache along with slurred speech, changes in your vision, or numbness or weakness in your arms or legs.    A headache for longer than 3 days.    Frequent headaches, especially in the morning.    Headaches with seizures     Seek immediate medical attention if you have a headache that you would call \"the worst headache you have ever had.\"     [id number] [employer]. [address] [place] [address], [place] [address]. All rights reserved. This information is not intended as a substitute for professional medical care. Always follow your healthcare professional's instructions.     home to rest.  Tylenol/ibuprofen as needed for pain.  He may use Percocet for more difficult to manage pain.  Zofran as needed for nausea or vomiting.  If not improving please return to the emergency department or return to clinic for reevaluation.    "

## 2017-04-08 NOTE — ED AVS SNAPSHOT
[facility] Emergency Department    [address] [address]    Phone:  [phone]    Fax:  [phone]                                       Kimberley Choe   MRN: [id number]    Department:  [facility] Emergency Department   Date of Visit:  4/8/2017           Patient Information     Date Of Birth          1980        Your diagnoses for this visit were:     Acute nonintractable headache, unspecified headache type        You were seen by Benjamin Lewis MD.      Follow-up Information     Schedule an appointment as soon as possible for a visit with Yari Gonzalez APRN CNP.    Specialty:  Nurse Practitioner    Why:  As needed, If symptoms worsen    Contact information:    [facility]  [address] [address]  [phone]          Discharge Instructions         Self-Care for Headaches  Most headaches aren't serious and can be relieved with self-care. But some headaches may be a sign of another health problem like eye trouble or high blood pressure. To find the best treatment, learn what kind of headaches you get. For tension headaches, self-care will usually help. To treat migraines, ask your healthcare provider for advice. It is also possible to get both tension and migraine headaches. Self-care involves relieving the pain and avoiding headache  triggers  if you can.    Ways to reduce pain and tension  Try these steps:    Apply a cold compress or ice pack to the pain site.    Drink fluids. If nausea makes it hard to drink, try sucking on ice.    Rest. Protect yourself from bright light and loud noises.    Calm your emotions by imagining a peaceful scene.    Massage tight neck, shoulder, and head muscles.    To relax muscles, soak in a hot bath or use a hot shower.  Use medicines  Aspirin or aspirin substitutes, such as ibuprofen and acetaminophen, can relieve headache. Remember: Never give aspirin to anyone 18 years old or younger because of the risk of  "developing Reye syndrome. Use pain medicines only when necessary.  Track your headaches  Keeping a headache diary can help you and your healthcare provider identify what's causing your headaches:    Note when each headache happens.    Identify your activities and the foods you've eaten 6 to 8 hours before the headache began.    Look for any trends or \"triggers.\"  Signs of tension headache  Any of the following can be signs:    Dull pain or feeling of pressure in a tight band around your head    Pain in your neck or shoulders    Headache without a definite beginning or end    Headache after an activity such as driving or working on a computer  Signs of migraine  Any of the following can be signs:    Throbbing pain on one or both sides of your head    Nausea or vomiting    Extreme sensitivity to light, sound, and smells    Bright spots, flashes, or other visual changes    Pain or nausea so severe that you can't continue your daily activities  Call your healthcare provider   If you have any of the following symptoms, contact your healthcare provider:    A headache that lingers after a recent injury or bump to the head.    A fever with a stiff neck or pain when you bend your head toward your chest.    A headache along with slurred speech, changes in your vision, or numbness or weakness in your arms or legs.    A headache for longer than 3 days.    Frequent headaches, especially in the morning.    Headaches with seizures     Seek immediate medical attention if you have a headache that you would call \"the worst headache you have ever had.\"     1916-9643 [employer]. [address] [place] [address], [place] [address]. All rights reserved. This information is not intended as a substitute for professional medical care. Always follow your healthcare professional's instructions.     home to rest.  Tylenol/ibuprofen as needed for pain.  He may use Percocet for more difficult to manage pain.  Zofran as needed for nausea or " vomiting.  If not improving please return to the emergency department or return to clinic for reevaluation.      Future Appointments        Provider Department Dept Phone Center    4/10/2017 10:00 AM Kirsten Randall MD [facility] [phone] [facility]    4/24/2017 2:30 PM Charisma Santacruz MD [facility] [phone] [facility]      24 Hour Appointment Hotline       To make an appointment at any [facility], call [phone]). If you don't have a family doctor or clinic, we will help you find one. [facility] are conveniently located to serve the needs of you and your family.             Review of your medicines      Our records show that you are taking the medicines listed below. If these are incorrect, please call your family doctor or clinic.        Dose / Directions Last dose taken    levothyroxine 112 MCG tablet   Commonly known as:  SYNTHROID/LEVOTHROID   Dose:  112 mcg   Quantity:  90 tablet        Take 1 tablet (112 mcg) by mouth daily   Refills:  3        ondansetron 4 MG ODT tab   Commonly known as:  ZOFRAN-ODT   Dose:  4 mg   Quantity:  10 tablet        Take 1 tablet (4 mg) by mouth every 8 hours as needed for nausea   Refills:  0        VITAMIN D (CHOLECALCIFEROL) PO   Dose:  1000 Units        Take 1,000 Units by mouth daily   Refills:  0                Procedures and tests performed during your visit     Basic metabolic panel    CBC with platelets differential    CRP inflammation    CSF Culture Aerobic Bacterial    CT Head w/o Contrast    Carbon monoxide    Cell count with differential CSF: Tube 4    Glucose CSF: Tube 2    Gram stain    Protein total CSF: Tube 2      Orders Needing Specimen Collection     None      Pending Results     Date and Time Order Name Status Description    4/8/2017 1044 CSF Culture Aerobic Bacterial Preliminary             Pending Culture Results     Date and Time Order Name Status Description    4/8/2017 1044 CSF Culture Aerobic  Bacterial Preliminary             Test Results From Your Hospital Stay        4/8/2017 10:38 AM      Component Results     Component Value Ref Range & Units Status    Sodium 139 133 - 144 mmol/L Final    Potassium 3.9 3.4 - 5.3 mmol/L Final    Chloride 104 94 - 109 mmol/L Final    Carbon Dioxide 28 20 - 32 mmol/L Final    Anion Gap 7 3 - 14 mmol/L Final    Glucose 100 (H) 70 - 99 mg/dL Final    Urea Nitrogen 10 7 - 30 mg/dL Final    Creatinine 0.72 0.52 - 1.04 mg/dL Final    GFR Estimate >90  Non  GFR Calc   >60 mL/min/1.7m2 Final    GFR Estimate If Black >90   GFR Calc   >60 mL/min/1.7m2 Final    Calcium 8.5 8.5 - 10.1 mg/dL Final         4/8/2017 10:25 AM      Component Results     Component Value Ref Range & Units Status    WBC 5.8 4.0 - 11.0 10e9/L Final    RBC Count 5.38 (H) 3.8 - 5.2 10e12/L Final    Hemoglobin 15.4 11.7 - 15.7 g/dL Final    Hematocrit 44.3 35.0 - 47.0 % Final    MCV 82 78 - 100 fl Final    MCH 28.6 26.5 - 33.0 pg Final    MCHC 34.8 31.5 - 36.5 g/dL Final    RDW 12.3 10.0 - 15.0 % Final    Platelet Count 189 150 - 450 10e9/L Final    Diff Method Automated Method  Final    % Neutrophils 66.7 % Final    % Lymphocytes 20.3 % Final    % Monocytes 9.3 % Final    % Eosinophils 1.5 % Final    % Basophils 0.7 % Final    % Immature Granulocytes 1.5 % Final    Absolute Neutrophil 3.9 1.6 - 8.3 10e9/L Final    Absolute Lymphocytes 1.2 0.8 - 5.3 10e9/L Final    Absolute Monocytes 0.5 0.0 - 1.3 10e9/L Final    Absolute Eosinophils 0.1 0.0 - 0.7 10e9/L Final    Absolute Basophils 0.0 0.0 - 0.2 10e9/L Final    Abs Immature Granulocytes 0.1 0 - 0.4 10e9/L Final         4/8/2017 10:38 AM      Component Results     Component Value Ref Range & Units Status    CRP Inflammation <2.9 0.0 - 8.0 mg/L Final         4/8/2017 10:29 AM      Component Results     Component Value Ref Range & Units Status    Carbon Monoxide 1.5 0 - 2 % Final         4/8/2017 10:29 AM      Narrative     CT  HEAD WITHOUT CONTRAST  4/8/2017 10:25 AM    HISTORY: Headache.    COMPARISON: None.    TECHNIQUE: Routine transverse CT images of the head without  intravenous contrast. Radiation dose for this scan was reduced using  automated exposure control, adjustment of the mA and/or kV according  to patient size, or iterative reconstruction technique.    FINDINGS: The ventricles are normal in size, shape and configuration.  The brain parenchyma and subarachnoid spaces are normal. There is no  evidence of intracranial hemorrhage, mass, acute infarct, or anomaly.  The visualized portions of the sinuses and mastoids appear normal. No  fracture is seen.        Impression     IMPRESSION: Unremarkable CT scan of the head.    ARTHUR EMMANUEL MD         4/8/2017 12:55 PM      Component Results     Component Value Ref Range & Units Status    Glucose CSF 61 40 - 70 mg/dL Final    CSF glucose concentrations are about 60 percent of normal plasma glucose.         4/8/2017 12:55 PM      Component Results     Component Value Ref Range & Units Status    Protein Total CSF 37 15 - 60 mg/dL Final         4/8/2017  2:34 PM      Component Results     Component    Specimen Description    Cerebrospinal fluid    Special Requests    Gram smear performed on concentrated specimen    Gram Stain    No organisms seen  No WBC'S seen      Micro Report Status    FINAL 04/08/2017 4/8/2017  1:15 PM      Component Results     Component    Specimen Description    Cerebrospinal fluid    Special Requests    Culture performed on concentrated specimen    Culture Micro    Pending    Micro Report Status    Pending         4/8/2017  3:02 PM      Component Results     Component Value Ref Range & Units Status    WBC CSF 1 0 - 5 /uL Final    RBC CSF 2 0 - 2 /uL Final    Tube Number 3 # Final    Color CSF Colorless CLRL Final    Appearance CSF Clear CLER Final                Thank you for choosing [facility]       Thank you for choosing [facility] for your care.  Our goal is always to provide you with excellent care. Hearing back from our patients is one way we can continue to improve our services. Please take a few minutes to complete the written survey that you may receive in the mail after you visit with us. Thank you!        [employer] Information     [employer] gives you secure access to your electronic health record. If you see a primary care provider, you can also send messages to your care team and make appointments. If you have questions, please call your primary care clinic.  If you do not have a primary care provider, please call [phone] and they will assist you.        Care EveryWhere ID     This is your Care EveryWhere ID. This could be used by other organizations to access your [facility] medical records  [id number]        After Visit Summary       This is your record. Keep this with you and show to your community pharmacist(s) and doctor(s) at your next visit.

## 2017-04-08 NOTE — ED PROVIDER NOTES
History     Chief Complaint   Patient presents with     Headache     sudden onset this morning, vomiting     HPI  Kimberley Choe is a 36 year old female, past medical history is significant for hypothyroidism, anemia, celiac disease, nonalcoholic fatty liver disease, GERD, presents to the emergency department with concerns of sudden onset headache associated with vomiting the a.m. of presentation.  History is obtained from the patient who states that over the past week she has had URI type symptoms including some cough, dull all over headache, runny nose, congestion, but no fever.  She has several children at home who have had the same symptoms only with fevers of 102-103.  Yesterday evening the headache seemed to let off.  This morning as she was brushing her hair she developed a very intense, severe, 10/10 headache in the left retro-orbital area.  It was associated with nausea and several episodes of vomiting.  There was no photophobia, phonophobia, no blurring of vision or double vision.  She tried to take ibuprofen but threw it up about 15 minutes after she took it, took some sublingual Zofran and the nausea seems a bit better now.  The headache is still severe.  The patient does not get headaches typically, she has never had a headache like this before.  There is a family history for migraine and her sister but her sister also has multiple brain tumors.  There is no history for AVM or aneurysm that she is aware of.  To be clear, the patient was not performing any type of lifting and straining bending or exertion that involved a Valsalva type maneuver when the headache started.    Active Ambulatory Problems     Diagnosis Date Noted     Hypothyroidism 12/20/2005     Undiagnosed cardiac murmurs 03/07/2008     Anemia 06/17/2010     Hip pain 09/13/2010     CARDIOVASCULAR SCREENING; LDL GOAL LESS THAN 160 10/31/2010     Celiac disease 11/10/2010     Joint pains 02/22/2013     Fatty liver disease, nonalcoholic  10/03/2013     Resolved Ambulatory Problems     Diagnosis Date Noted     Encounter for supervision of other normal pregnancy 12/20/2005     PREGNANCY PRENAT CARE, HX PRE-TERM LAB 01/18/2006     FETUS LARGE FOR DATES 02/10/2006     Antepartum anemia 04/10/2006     Right hip labral tear 08/24/2006     Presence of intrauterine contraceptive device 09/04/2007     LSIL Pap 08/03/2009     Encounter for supervision of other normal pregnancy 02/12/2010     Rh negative status during pregnancy 02/14/2010     Abnormal glucose 06/17/2010     Large for dates affecting management of mother 06/17/2010     [place] contractions 07/14/2010     Breech presentation 08/11/2010     Dysmenorrhea 02/01/2012     GERD (gastroesophageal reflux disease) 01/30/2014     Past Medical History:   Diagnosis Date     Anemia, unspecified      Dysmenorrhea 2/1/2012     GERD (gastroesophageal reflux disease) 1/30/2014     Papanicolaou smear of cervix with low grade squamous intraepithelial lesion (LGSIL) 7/09     Right hip labral tear 8/24/2006     Unspecified closed fracture of ankle      Unspecified disorder of thyroid 10/20/2002     Past Surgical History:   Procedure Laterality Date     C HIP ARTHROSCOPY, DX  6/08    (R) Anterior superior labral debridement.      ESOPHAGOSCOPY, GASTROSCOPY, DUODENOSCOPY (EGD), COMBINED  11/1/2010    EGD     THYROIDECTOMY   2002    S/p thyroidectomy 2002     Social History     Social History     Marital status:      Spouse name: N/A     Number of children: 2     Years of education: N/A     Occupational History      [employer]     Social History Main Topics     Smoking status: Never Smoker     Smokeless tobacco: Never Used     Alcohol use Yes      Comment: occasional- been two years     Drug use: No     Sexual activity: Yes     Partners: Male     Birth control/ protection: Male Surgical      Comment: Vasectomy     Other Topics Concern     Parent/Sibling W/ Cabg, Mi Or Angioplasty Before  65f 55m? No     Exercise Yes     ride horses     Social History Narrative     Family History   Problem Relation Age of Onset     DIABETES Maternal Grandmother      Alcohol/Drug Maternal Grandmother      GASTROINTESTINAL DISEASE Paternal Grandmother      IBS     Hypertension Paternal Grandmother      CEREBROVASCULAR DISEASE Paternal Grandmother      Alzheimer Disease Paternal Grandmother      Hypertension Father      Arthritis Father      HEART DISEASE Father      cardimyopthy age 60, arrythmias     Lipids Father      Neurologic Disorder Sister      brain tumor     GASTROINTESTINAL DISEASE Mother      celiac     C.A.D. No family hx of      Breast Cancer No family hx of      Cancer - colorectal No family hx of      Current Facility-Administered Medications   Medication     0.9% sodium chloride infusion     HYDROmorphone (PF) (DILAUDID) injection 0.5 mg     ondansetron (ZOFRAN) injection 4 mg     Current Outpatient Prescriptions   Medication     VITAMIN D, CHOLECALCIFEROL, PO     ondansetron (ZOFRAN-ODT) 4 MG ODT tab     levothyroxine (SYNTHROID, LEVOTHROID) 112 MCG tablet        Allergies   Allergen Reactions     Gluten Other (See Comments)     Celiac disease     Macrobid [Nitrofuran Derivatives] Nausea and Vomiting     Zithromax [Azithromycin Dihydrate] Nausea and Vomiting       I have reviewed the Medications, Allergies, Past Medical and Surgical History, and Social History in the Epic system.    Review of Systems   All other systems reviewed and are negative.      Physical Exam   BP: 123/80  Heart Rate: 84  Temp: 97.7  F (36.5  C)  SpO2: 97 %  Physical Exam   Constitutional: She is oriented to person, place, and time. She appears well-developed and well-nourished.   HENT:   Head: Normocephalic and atraumatic.   Right Ear: External ear normal.   Left Ear: External ear normal.   Nose: Nose normal.   Mouth/Throat: Oropharynx is clear and moist.   Eyes: Conjunctivae are normal. Pupils are equal, round, and reactive to  light.   Neck: Normal range of motion. Neck supple.   Cardiovascular: Normal rate, regular rhythm, normal heart sounds and intact distal pulses.    Pulmonary/Chest: Effort normal and breath sounds normal.   Abdominal: Soft. Bowel sounds are normal.   Musculoskeletal: Normal range of motion.   Neurological: She is alert and oriented to person, place, and time. She has normal strength. No cranial nerve deficit or sensory deficit. She displays a negative Romberg sign. GCS eye subscore is 4. GCS verbal subscore is 5. GCS motor subscore is 6.   Reflex Scores:       Tricep reflexes are 2+ on the right side and 2+ on the left side.       Bicep reflexes are 2+ on the right side and 2+ on the left side.       Brachioradialis reflexes are 2+ on the right side and 2+ on the left side.       Patellar reflexes are 2+ on the right side and 2+ on the left side.       Achilles reflexes are 2+ on the right side and 2+ on the left side.  Skin: Skin is warm and dry.   Psychiatric: She has a normal mood and affect. Her behavior is normal.   Nursing note and vitals reviewed.      ED Course     ED Course     Procedures         3:27 PM  LP results are finally reported not reviewed this with the patient.  No concerning findings.  Her headache is better, still nauseated, just received an additional dose of Zofran will try oral fluids.    Critical Care time:  none     Results for orders placed or performed during the hospital encounter of 04/08/17   CT Head w/o Contrast    Narrative    CT HEAD WITHOUT CONTRAST  4/8/2017 10:25 AM    HISTORY: Headache.    COMPARISON: None.    TECHNIQUE: Routine transverse CT images of the head without  intravenous contrast. Radiation dose for this scan was reduced using  automated exposure control, adjustment of the mA and/or kV according  to patient size, or iterative reconstruction technique.    FINDINGS: The ventricles are normal in size, shape and configuration.  The brain parenchyma and subarachnoid  spaces are normal. There is no  evidence of intracranial hemorrhage, mass, acute infarct, or anomaly.  The visualized portions of the sinuses and mastoids appear normal. No  fracture is seen.      Impression    IMPRESSION: Unremarkable CT scan of the head.    ARTHUR EMMANUEL MD                 Labs Ordered and Resulted from Time of ED Arrival Up to the Time of Departure from the ED   BASIC METABOLIC PANEL - Abnormal; Notable for the following:        Result Value    Glucose 100 (*)     All other components within normal limits   CBC WITH PLATELETS DIFFERENTIAL - Abnormal; Notable for the following:     RBC Count 5.38 (*)     All other components within normal limits   CRP INFLAMMATION   CARBON MONOXIDE   GLUCOSE CSF   PROTEIN TOTAL CSF   GRAM STAIN   CSF CULTURE AEROBIC BACTERIAL   CELL COUNT WITH DIFFERENTIAL CSF     4:38 PM  Lab diagnostics reviewed with negative In addition to negative head CT.  Reassuring.  The patient's nausea has essentially resolved and she is able to tolerate oral fluids and rates her headache around 3/10.  She would like to go home.      Assessments & Plan (with Medical Decision Making)   36-year-old female past medical history reviewed above, presentation in the emergency department with concerns of acute severe headache as described in the HPI concern based on history for the potential for intracranial bleeding.  Physical exam revealed a nonlateralizing neurologic exam with the patient clearly uncomfortable due to pain.  The patient was counseled regarding imaging as well as spinal tap if this were negative.  CT was negative, spinal tap was ultimately negative as well.  Her headache was treated to her satisfaction as well as nausea  In  the emergency department plan for disposition to home.  Percocet and Zofran for pain and nausea needed in addition to Tylenol/ibuprofen.  Return if not improving or worse.    Disclaimer: This note consists of symbols derived from keyboarding, dictation  and/or voice recognition software. As a result, there may be errors in the script that have gone undetected. Please consider this when interpreting information found in this chart.      I have reviewed the nursing notes.    I have reviewed the findings, diagnosis, plan and need for follow up with the patient.    New Prescriptions    No medications on file       Final diagnoses:   Acute nonintractable headache, unspecified headache type       4/8/2017   [facility] EMERGENCY DEPARTMENT     Benjamin Lewis MD  04/08/17 3296

## 2017-04-08 NOTE — ANESTHESIA POSTPROCEDURE EVALUATION
Patient: Kimberley Choe    * No procedures listed *    Diagnosis:* No pre-op diagnosis entered *  Diagnosis Additional Information: No value filed.    Anesthesia Type:  Spinal    Note:  Anesthesia Post Evaluation    Patient location during evaluation: Bedside  Patient participation: Able to fully participate in evaluation  Level of consciousness: awake and alert  Airway patency: patent  Cardiovascular status: acceptable  Respiratory status: acceptable  PONV: none     Anesthetic complications: None          Last vitals:  Vitals:    04/08/17 0927   BP: 123/80   Temp: 36.5  C (97.7  F)   SpO2: 97%         Electronically Signed By: MING Maza CRNA  April 8, 2017  12:35 PM

## 2017-04-08 NOTE — ANESTHESIA PROCEDURE NOTES
Peripheral nerve/Neuraxial procedure note : lumbar puncture  Pre-Procedure  Performed by  CASSIE SANCHEZ   Location: ED      Pre-Anesthestic Checklist: patient identified, IV checked, risks and benefits discussed, informed consent, monitors and equipment checked, pre-op evaluation and at physician/surgeon's request    Timeout  Correct Patient: Yes   Correct Procedure: Yes   Correct Site: Yes   Correct Laterality: N/A   Correct Position: Yes   Site Marked: N/A   .   Procedure Documentation  ASA 2  Diagnosis:headache.    Procedure:    Lumbar puncture.  Insertion Site:L3-4  (midline approach)      Patient Prep;mask, sterile gloves, povidone-iodine 7.5% surgical scrub, patient draped.  .  Needle: (). # of attempts: 2. # of redirects: 3.  Spinal Needle: Katlin tip 25 (not able to pass through space w/ 22g katlin) G. 3.5 in.  No introducer used. . .     Assessment/Narrative  Paresthesias: No.  .  .  5 mL of clear CSF fluid removed while lateral   . Comments:  Pt tolerated procedure well.

## 2017-04-08 NOTE — ANESTHESIA CARE TRANSFER NOTE
Patient: Kimberley Choe    * No procedures listed *    Diagnosis: * No pre-op diagnosis entered *  Diagnosis Additional Information: No value filed.    Anesthesia Type:   Spinal     Note:  Airway :Room Air  Patient transferred to:Emergency Department        Vitals: (Last set prior to Anesthesia Care Transfer)              Electronically Signed By: MING Maza CRNA  April 8, 2017  12:34 PM

## 2017-04-08 NOTE — ANESTHESIA PREPROCEDURE EVALUATION
Anesthesia Evaluation     . Pt has had prior anesthetic. Type: General and Regional    No history of anesthetic complications          ROS/MED HX    ENT/Pulmonary:     (+), recent URI resolved . .    Neurologic: Comment: Current headache      Cardiovascular:     (+) ----. : . . . :. valvular problems/murmurs .       METS/Exercise Tolerance:     Hematologic:     (+) Anemia, -      Musculoskeletal:  - neg musculoskeletal ROS       GI/Hepatic:     (+) GERD Asymptomatic on medication, liver disease (non-alcoholic fatty liver),       Renal/Genitourinary:  - ROS Renal section negative       Endo: Comment: S/p thyroidectomy 2002    (+) thyroid problem hypothyroidism, .      Psychiatric:  - neg psychiatric ROS       Infectious Disease:  - neg infectious disease ROS       Malignancy:      - no malignancy   Other: Comment: Celiac disease                                   Anesthesia Plan  Procedure only, no anesthetic delivered    History & Physical Review  History and physical reviewed and following examination; no interval change.    ASA Status:  2 emergent.        Plan for Spinal     Today's labs normal      Postoperative Care      Consents  Anesthetic plan, risks, benefits and alternatives discussed with:  Patient..                          .

## 2017-04-08 NOTE — ED AVS SNAPSHOT
[facility] Emergency Department    [address] [address]    Phone:  [phone]    Fax:  [phone]                                       Kimberley Choe   MRN: [id number]    Department:  [facility] Emergency Department   Date of Visit:  4/8/2017           After Visit Summary Signature Page     I have received my discharge instructions, and my questions have been answered. I have discussed any challenges I see with this plan with the nurse or doctor.    ..........................................................................................................................................  Patient/Patient Representative Signature      ..........................................................................................................................................  Patient Representative Print Name and Relationship to Patient    ..................................................               ................................................  Date                                            Time    ..........................................................................................................................................  Reviewed by Signature/Title    ...................................................              ..............................................  Date                                                            Time

## 2017-04-10 ENCOUNTER — OFFICE VISIT (OUTPATIENT)
Dept: FAMILY MEDICINE | Facility: CLINIC | Age: 37
End: 2017-04-10
Payer: COMMERCIAL

## 2017-04-10 VITALS
TEMPERATURE: 98.2 F | HEIGHT: 64 IN | BODY MASS INDEX: 24.43 KG/M2 | WEIGHT: 143.13 LBS | RESPIRATION RATE: 16 BRPM | SYSTOLIC BLOOD PRESSURE: 102 MMHG | HEART RATE: 76 BPM | DIASTOLIC BLOOD PRESSURE: 67 MMHG

## 2017-04-10 DIAGNOSIS — G44.85 PRIMARY STABBING HEADACHE: Primary | ICD-10-CM

## 2017-04-10 DIAGNOSIS — E89.0 POSTOPERATIVE HYPOTHYROIDISM: ICD-10-CM

## 2017-04-10 PROCEDURE — 99214 OFFICE O/P EST MOD 30 MIN: CPT | Performed by: FAMILY MEDICINE

## 2017-04-10 RX ORDER — LEVOTHYROXINE SODIUM 125 UG/1
125 TABLET ORAL DAILY
Qty: 90 TABLET | Refills: 3 | Status: SHIPPED | OUTPATIENT
Start: 2017-04-10 | End: 2018-04-24

## 2017-04-10 NOTE — MR AVS SNAPSHOT
After Visit Summary   4/10/2017    Kimberley Choe    MRN: [id number]           Patient Information     Date Of Birth          1980        Visit Information        Provider Department      4/10/2017 10:00 AM Kirsten Randall MD [facility]        Today's Diagnoses     Primary stabbing headache    -  1    Postoperative hypothyroidism           Follow-ups after your visit        Your next 10 appointments already scheduled     Apr 24, 2017  2:30 PM CDT   Williams Long with Charisma Santacruz MD   [facility] ([facility])    [address] [address]   [phone]              Future tests that were ordered for you today     Open Future Orders        Priority Expected Expires Ordered    TSH Routine 6/9/2017 8/9/2017 4/10/2017    T4 FREE Routine 6/9/2017 8/9/2017 4/10/2017            Who to contact     If you have questions or need follow up information about today's clinic visit or your schedule please contact [facility] directly at [phone].  Normal or non-critical lab and imaging results will be communicated to you by [employer], letter or phone within 4 business days after the clinic has received the results. If you do not hear from us within 7 days, please contact the clinic through [employer]t or phone. If you have a critical or abnormal lab result, we will notify you by phone as soon as possible.  Submit refill requests through [employer] or call your pharmacy and they will forward the refill request to us. Please allow 3 business days for your refill to be completed.          Additional Information About Your Visit        MyChart Information     [employer] gives you secure access to your electronic health record. If you see a primary care provider, you can also send messages to your care team and make appointments. If you have questions, please call your primary care clinic.  If you do not have a primary care provider, please  "call [phone] and they will assist you.        Care EveryWhere ID     This is your Care EveryWhere ID. This could be used by other organizations to access your [facility] medical records  [id number]        Your Vitals Were     Pulse Temperature Respirations Height Last Period BMI (Body Mass Index)    76 98.2  F (36.8  C) (Tympanic) 16 5' 4\" (1.626 m) 03/28/2017 24.57 kg/m2       Blood Pressure from Last 3 Encounters:   04/10/17 102/67   04/08/17 102/60   04/04/17 127/86    Weight from Last 3 Encounters:   04/10/17 143 lb 2 oz (64.9 kg)   04/04/17 144 lb (65.3 kg)   04/04/17 143 lb 9.6 oz (65.1 kg)                 Today's Medication Changes          These changes are accurate as of: 4/10/17 10:53 AM.  If you have any questions, ask your nurse or doctor.               These medicines have changed or have updated prescriptions.        Dose/Directions    * levothyroxine 112 MCG tablet   Commonly known as:  SYNTHROID/LEVOTHROID   This may have changed:  Another medication with the same name was added. Make sure you understand how and when to take each.   Used for:  Postoperative hypothyroidism        Dose:  112 mcg   Take 1 tablet (112 mcg) by mouth daily   Quantity:  90 tablet   Refills:  3       * levothyroxine 125 MCG tablet   Commonly known as:  SYNTHROID/LEVOTHROID   This may have changed:  You were already taking a medication with the same name, and this prescription was added. Make sure you understand how and when to take each.   Used for:  Postoperative hypothyroidism        Dose:  125 mcg   Take 1 tablet (125 mcg) by mouth daily   Quantity:  90 tablet   Refills:  3       * Notice:  This list has 2 medication(s) that are the same as other medications prescribed for you. Read the directions carefully, and ask your doctor or other care provider to review them with you.         Where to get your medicines      These medications were sent to [facility] - [place], [place] - [address] [facility] [address]     Phone:  [phone]     levothyroxine 125 MCG tablet                Primary Care Provider Office Phone # Fax #    MING Casanova [facility] [phone]       [facility] [address] [address]        Thank you!     Thank you for choosing [facility]  for your care. Our goal is always to provide you with excellent care. Hearing back from our patients is one way we can continue to improve our services. Please take a few minutes to complete the written survey that you may receive in the mail after your visit with us. Thank you!             Your Updated Medication List - Protect others around you: Learn how to safely use, store and throw away your medicines at www.disposemymeds.org.          This list is accurate as of: 4/10/17 10:53 AM.  Always use your most recent med list.                   Brand Name Dispense Instructions for use    * levothyroxine 112 MCG tablet    SYNTHROID/LEVOTHROID    90 tablet    Take 1 tablet (112 mcg) by mouth daily       * levothyroxine 125 MCG tablet    SYNTHROID/LEVOTHROID    90 tablet    Take 1 tablet (125 mcg) by mouth daily       ondansetron 4 MG ODT tab    ZOFRAN-ODT    10 tablet    Take 1 tablet (4 mg) by mouth every 8 hours as needed for nausea       VITAMIN D (CHOLECALCIFEROL) PO      Take 1,000 Units by mouth daily       * Notice:  This list has 2 medication(s) that are the same as other medications prescribed for you. Read the directions carefully, and ask your doctor or other care provider to review them with you.

## 2017-04-10 NOTE — NURSING NOTE
"Chief Complaint   Patient presents with     ER F/U     Patient seen in ED on 4/8/17 for massive headache (has never experienced headache like it before) with vomiting.        Initial /67  Pulse 76  Temp 98.2  F (36.8  C) (Tympanic)  Resp 16  Ht 5' 4\" (1.626 m)  Wt 143 lb 2 oz (64.9 kg)  LMP 03/28/2017  BMI 24.57 kg/m2 Estimated body mass index is 24.57 kg/(m^2) as calculated from the following:    Height as of this encounter: 5' 4\" (1.626 m).    Weight as of this encounter: 143 lb 2 oz (64.9 kg).  Medication Reconciliation: complete  "

## 2017-04-10 NOTE — PROGRESS NOTES
"  SUBJECTIVE:                                                    Kimberley Choe is 36 year old female   Chief Complaint   Patient presents with     ER F/U     Patient seen in ED on 4/8/17 for massive headache (has never experienced headache like it before) with vomiting.      ED/UC Followup:    Facility:  [facility] ED  Date of visit: 4/8/2017  Reason for visit: Acute nonintractable headache, unspecified headache type  Current Status: Mild headache. Still not feeling 100%.     Report from ED:    \"Kimberley Choe is a 36 year old female, past medical history is significant for hypothyroidism, anemia, celiac disease, nonalcoholic fatty liver disease, GERD, presents to the emergency department with concerns of sudden onset headache associated with vomiting the a.m. of presentation. History is obtained from the patient who states that over the past week she has had URI type symptoms including some cough, dull all over headache, runny nose, congestion, but no fever. She has several children at home who have had the same symptoms only with fevers of 102-103. Yesterday evening the headache seemed to let off. This morning as she was brushing her hair she developed a very intense, severe, 10/10 headache in the left retro-orbital area. It was associated with nausea and several episodes of vomiting. There was no photophobia, phonophobia, no blurring of vision or double vision. She tried to take ibuprofen but threw it up about 15 minutes after she took it, took some sublingual Zofran and the nausea seems a bit better now. The headache is still severe. The patient does not get headaches typically, she has never had a headache like this before. There is a family history for migraine and her sister but her sister also has multiple brain tumors. There is no history for AVM or aneurysm that she is aware of.  To be clear, the patient was not performing any type of lifting and straining bending or exertion that involved a " "Valsalva type maneuver when the headache started.\"  CT of brain negative, IVF pain meds and zofran helped. Is nearly gone now.  Would like her thyroid medication refilled at higher dose, TSH is running high.  Had thyroid removed age 12 for Hashimoto thyroiditis.      Problem list and histories reviewed & adjusted, as indicated.  Additional history: as documented  Patient Active Problem List   Diagnosis     Hypothyroidism     Undiagnosed cardiac murmurs     Anemia     Hip pain     CARDIOVASCULAR SCREENING; LDL GOAL LESS THAN 160     Celiac disease     Joint pains     Fatty liver disease, nonalcoholic     Past Surgical History:   Procedure Laterality Date     C HIP ARTHROSCOPY, DX  6/08    (R) Anterior superior labral debridement.      ESOPHAGOSCOPY, GASTROSCOPY, DUODENOSCOPY (EGD), COMBINED  11/1/2010    EGD     THYROIDECTOMY   2002    S/p thyroidectomy 2002       Social History   Substance Use Topics     Smoking status: Never Smoker     Smokeless tobacco: Never Used     Alcohol use Yes      Comment: occasional- been two years     Family History   Problem Relation Age of Onset     DIABETES Maternal Grandmother      Alcohol/Drug Maternal Grandmother      GASTROINTESTINAL DISEASE Paternal Grandmother      IBS     Hypertension Paternal Grandmother      CEREBROVASCULAR DISEASE Paternal Grandmother      Alzheimer Disease Paternal Grandmother      Hypertension Father      Arthritis Father      HEART DISEASE Father      cardimyopthy age 60, arrythmias     Lipids Father      Neurologic Disorder Sister      brain tumor     GASTROINTESTINAL DISEASE Mother      celiac     C.A.D. No family hx of      Breast Cancer No family hx of      Cancer - colorectal No family hx of          Current Outpatient Prescriptions   Medication Sig Dispense Refill     levothyroxine (SYNTHROID/LEVOTHROID) 125 MCG tablet Take 1 tablet (125 mcg) by mouth daily 90 tablet 3     VITAMIN D, CHOLECALCIFEROL, PO Take 1,000 Units by mouth daily       " "levothyroxine (SYNTHROID, LEVOTHROID) 112 MCG tablet Take 1 tablet (112 mcg) by mouth daily 90 tablet 3     ondansetron (ZOFRAN-ODT) 4 MG ODT tab Take 1 tablet (4 mg) by mouth every 8 hours as needed for nausea 10 tablet 0     Allergies   Allergen Reactions     Gluten Other (See Comments)     Celiac disease     Macrobid [Nitrofuran Derivatives] Nausea and Vomiting     Zithromax [Azithromycin Dihydrate] Nausea and Vomiting     Recent Labs   Lab Test  04/08/17   0940  04/04/17   1200  03/10/16   1130  04/07/15   1046   07/18/13   0957   04/26/12   0910   LDL   --    --    --    --    --   115   --   90   HDL   --    --    --    --    --   37*   --   38*   TRIG   --    --    --    --    --   133   --   120   ALT   --   43  24  18   < >  197*   < >   --    CR  0.72  0.70  0.67  0.66   < >   --    < >   --    GFRESTIMATED  >90  Non  GFR Calc    >90  Non  GFR Calc    >90  Non  GFR Calc    >90  Non  GFR Calc     < >   --    < >   --    GFRESTBLACK  >90   GFR Calc    >90   GFR Calc    >90   GFR Calc    >90   GFR Calc     < >   --    < >   --    POTASSIUM  3.9  4.2  3.4  4.4   < >   --    < >   --    TSH   --   8.43*  1.87   --    < >   --    < >  0.02*    < > = values in this interval not displayed.      BP Readings from Last 3 Encounters:   04/10/17 102/67   04/08/17 102/60   04/04/17 127/86    Wt Readings from Last 3 Encounters:   04/10/17 143 lb 2 oz (64.9 kg)   04/04/17 144 lb (65.3 kg)   04/04/17 143 lb 9.6 oz (65.1 kg)         ROS:  Constitutional, HEENT, cardiovascular, pulmonary, gi and gu systems are negative, except as otherwise noted.    OBJECTIVE:                                                    /67  Pulse 76  Temp 98.2  F (36.8  C) (Tympanic)  Resp 16  Ht 5' 4\" (1.626 m)  Wt 143 lb 2 oz (64.9 kg)  LMP 03/28/2017  BMI 24.57 kg/m2  GENERAL APPEARANCE ADULT: alert, appears " ill, no distress, tired appearing  HENT: Ears and TMs normal, oral mucosa and posterior oropharynx normal  RESP: lungs clear to auscultation   NEURO: Alert, oriented, speech and mentation normal  PSYCH: mentation appears normal., affect and mood normal  Diagnostic Test Results:  Results for orders placed or performed during the hospital encounter of 04/08/17   CT Head w/o Contrast    Narrative    CT HEAD WITHOUT CONTRAST  4/8/2017 10:25 AM    HISTORY: Headache.    COMPARISON: None.    TECHNIQUE: Routine transverse CT images of the head without  intravenous contrast. Radiation dose for this scan was reduced using  automated exposure control, adjustment of the mA and/or kV according  to patient size, or iterative reconstruction technique.    FINDINGS: The ventricles are normal in size, shape and configuration.  The brain parenchyma and subarachnoid spaces are normal. There is no  evidence of intracranial hemorrhage, mass, acute infarct, or anomaly.  The visualized portions of the sinuses and mastoids appear normal. No  fracture is seen.      Impression    IMPRESSION: Unremarkable CT scan of the head.    ARTHUR EMMANUEL MD   Basic metabolic panel   Result Value Ref Range    Sodium 139 133 - 144 mmol/L    Potassium 3.9 3.4 - 5.3 mmol/L    Chloride 104 94 - 109 mmol/L    Carbon Dioxide 28 20 - 32 mmol/L    Anion Gap 7 3 - 14 mmol/L    Glucose 100 (H) 70 - 99 mg/dL    Urea Nitrogen 10 7 - 30 mg/dL    Creatinine 0.72 0.52 - 1.04 mg/dL    GFR Estimate >90  Non  GFR Calc   >60 mL/min/1.7m2    GFR Estimate If Black >90   GFR Calc   >60 mL/min/1.7m2    Calcium 8.5 8.5 - 10.1 mg/dL   CBC with platelets differential   Result Value Ref Range    WBC 5.8 4.0 - 11.0 10e9/L    RBC Count 5.38 (H) 3.8 - 5.2 10e12/L    Hemoglobin 15.4 11.7 - 15.7 g/dL    Hematocrit 44.3 35.0 - 47.0 %    MCV 82 78 - 100 fl    MCH 28.6 26.5 - 33.0 pg    MCHC 34.8 31.5 - 36.5 g/dL    RDW 12.3 10.0 - 15.0 %    Platelet  Count 189 150 - 450 10e9/L    Diff Method Automated Method     % Neutrophils 66.7 %    % Lymphocytes 20.3 %    % Monocytes 9.3 %    % Eosinophils 1.5 %    % Basophils 0.7 %    % Immature Granulocytes 1.5 %    Absolute Neutrophil 3.9 1.6 - 8.3 10e9/L    Absolute Lymphocytes 1.2 0.8 - 5.3 10e9/L    Absolute Monocytes 0.5 0.0 - 1.3 10e9/L    Absolute Eosinophils 0.1 0.0 - 0.7 10e9/L    Absolute Basophils 0.0 0.0 - 0.2 10e9/L    Abs Immature Granulocytes 0.1 0 - 0.4 10e9/L   CRP inflammation   Result Value Ref Range    CRP Inflammation <2.9 0.0 - 8.0 mg/L   Carbon monoxide   Result Value Ref Range    Carbon Monoxide 1.5 0 - 2 %   Glucose CSF: Tube 2   Result Value Ref Range    Glucose CSF 61 40 - 70 mg/dL   Protein total CSF: Tube 2   Result Value Ref Range    Protein Total CSF 37 15 - 60 mg/dL   Gram stain   Result Value Ref Range    Specimen Description Cerebrospinal fluid     Special Requests Gram smear performed on concentrated specimen     Gram Stain No organisms seen  No WBC'S seen       Micro Report Status FINAL 04/08/2017    CSF Culture Aerobic Bacterial   Result Value Ref Range    Specimen Description Cerebrospinal fluid     Special Requests Culture performed on concentrated specimen     Culture Micro No growth after 1 day     Micro Report Status Pending    Cell count with differential CSF: Tube 4   Result Value Ref Range    WBC CSF 1 0 - 5 /uL    RBC CSF 2 0 - 2 /uL    Tube Number 3 #    Color CSF Colorless CLRL    Appearance CSF Clear CLER        ASSESSMENT/PLAN:                                                    1. Postoperative hypothyroidism  due for review and refill, taking medication without difficulty, needs higher dose, will hold on to the 112 mcg dose, may use in future as alternating 125/112  - levothyroxine (SYNTHROID/LEVOTHROID) 125 MCG tablet; Take 1 tablet (125 mcg) by mouth daily  Dispense: 90 tablet; Refill: 3  - TSH; Future  - T4 FREE; Future    2. Primary stabbing headache  Nearly resolved,  associated with URI and never had headache like this before so less likely migraine, family with URI as well.      Kirsten Randall MD  [facility]

## 2017-04-14 LAB
BACTERIA SPEC CULT: NORMAL
Lab: NORMAL
MICRO REPORT STATUS: NORMAL
SPECIMEN SOURCE: NORMAL

## 2017-05-18 ENCOUNTER — OFFICE VISIT (OUTPATIENT)
Dept: FAMILY MEDICINE | Facility: CLINIC | Age: 37
End: 2017-05-18
Payer: COMMERCIAL

## 2017-05-18 VITALS
SYSTOLIC BLOOD PRESSURE: 118 MMHG | DIASTOLIC BLOOD PRESSURE: 78 MMHG | HEIGHT: 64 IN | BODY MASS INDEX: 24.75 KG/M2 | WEIGHT: 145 LBS | HEART RATE: 82 BPM

## 2017-05-18 DIAGNOSIS — G89.29 CHRONIC NONINTRACTABLE HEADACHE, UNSPECIFIED HEADACHE TYPE: Primary | ICD-10-CM

## 2017-05-18 DIAGNOSIS — R51.9 CHRONIC NONINTRACTABLE HEADACHE, UNSPECIFIED HEADACHE TYPE: Primary | ICD-10-CM

## 2017-05-18 LAB
ALBUMIN SERPL-MCNC: 4.1 G/DL (ref 3.4–5)
ALP SERPL-CCNC: 55 U/L (ref 40–150)
ALT SERPL W P-5'-P-CCNC: 30 U/L (ref 0–50)
ANION GAP SERPL CALCULATED.3IONS-SCNC: 6 MMOL/L (ref 3–14)
AST SERPL W P-5'-P-CCNC: 19 U/L (ref 0–45)
BASOPHILS # BLD AUTO: 0 10E9/L (ref 0–0.2)
BASOPHILS NFR BLD AUTO: 0.4 %
BILIRUB SERPL-MCNC: 0.9 MG/DL (ref 0.2–1.3)
BUN SERPL-MCNC: 12 MG/DL (ref 7–30)
CALCIUM SERPL-MCNC: 8.3 MG/DL (ref 8.5–10.1)
CHLORIDE SERPL-SCNC: 105 MMOL/L (ref 94–109)
CO2 SERPL-SCNC: 28 MMOL/L (ref 20–32)
CREAT SERPL-MCNC: 0.68 MG/DL (ref 0.52–1.04)
DIFFERENTIAL METHOD BLD: NORMAL
EOSINOPHIL # BLD AUTO: 0.1 10E9/L (ref 0–0.7)
EOSINOPHIL NFR BLD AUTO: 1.1 %
ERYTHROCYTE [DISTWIDTH] IN BLOOD BY AUTOMATED COUNT: 12.9 % (ref 10–15)
GFR SERPL CREATININE-BSD FRML MDRD: ABNORMAL ML/MIN/1.7M2
GLUCOSE SERPL-MCNC: 77 MG/DL (ref 70–99)
HCT VFR BLD AUTO: 41.5 % (ref 35–47)
HGB BLD-MCNC: 14.3 G/DL (ref 11.7–15.7)
LYMPHOCYTES # BLD AUTO: 1.7 10E9/L (ref 0.8–5.3)
LYMPHOCYTES NFR BLD AUTO: 22.4 %
MCH RBC QN AUTO: 29.4 PG (ref 26.5–33)
MCHC RBC AUTO-ENTMCNC: 34.5 G/DL (ref 31.5–36.5)
MCV RBC AUTO: 85 FL (ref 78–100)
MONOCYTES # BLD AUTO: 1.1 10E9/L (ref 0–1.3)
MONOCYTES NFR BLD AUTO: 14.6 %
NEUTROPHILS # BLD AUTO: 4.7 10E9/L (ref 1.6–8.3)
NEUTROPHILS NFR BLD AUTO: 61.5 %
PLATELET # BLD AUTO: 223 10E9/L (ref 150–450)
POTASSIUM SERPL-SCNC: 3.6 MMOL/L (ref 3.4–5.3)
PROT SERPL-MCNC: 7.6 G/DL (ref 6.8–8.8)
RBC # BLD AUTO: 4.86 10E12/L (ref 3.8–5.2)
SODIUM SERPL-SCNC: 139 MMOL/L (ref 133–144)
T4 FREE SERPL-MCNC: 1.33 NG/DL (ref 0.76–1.46)
TSH SERPL DL<=0.05 MIU/L-ACNC: 1.14 MU/L (ref 0.4–4)
WBC # BLD AUTO: 7.6 10E9/L (ref 4–11)

## 2017-05-18 PROCEDURE — 85025 COMPLETE CBC W/AUTO DIFF WBC: CPT | Performed by: FAMILY MEDICINE

## 2017-05-18 PROCEDURE — 84443 ASSAY THYROID STIM HORMONE: CPT | Performed by: FAMILY MEDICINE

## 2017-05-18 PROCEDURE — 99214 OFFICE O/P EST MOD 30 MIN: CPT | Performed by: FAMILY MEDICINE

## 2017-05-18 PROCEDURE — 80053 COMPREHEN METABOLIC PANEL: CPT | Performed by: FAMILY MEDICINE

## 2017-05-18 PROCEDURE — 36415 COLL VENOUS BLD VENIPUNCTURE: CPT | Performed by: FAMILY MEDICINE

## 2017-05-18 PROCEDURE — 84439 ASSAY OF FREE THYROXINE: CPT | Performed by: FAMILY MEDICINE

## 2017-05-18 RX ORDER — SUMATRIPTAN 50 MG/1
50 TABLET, FILM COATED ORAL
Qty: 9 TABLET | Refills: 1 | Status: SHIPPED | OUTPATIENT
Start: 2017-05-18 | End: 2017-06-19

## 2017-05-18 NOTE — PROGRESS NOTES
a  SUBJECTIVE:                                                    Kimberley Choe is 36 year old female   Chief Complaint   Patient presents with     Headache     ongoing x 3 months     Headache     Onset: 3 months    Description:   Location: bilateral in the occipital area   Character: dull pain, sharp pain  Frequency:  weekly  Duration:  intermittent    Intensity: moderate    Progression of Symptoms:  worsening    Accompanying Signs & Symptoms:  Stiff neck: YES  Neck or upper back pain: no   Fever: no   Sinus pressure: no   Nausea or vomiting: YES- vomited once  Dizziness: YES  Numbness: no   Weakness: no   Visual changes: no    History:   Head trauma: no   Family history of migraines: no   Previous tests for headaches: no   Neurologist evaluations: no   Able to do daily activities: no   Wake with a headaches: YES  Do headaches wake you up: no   Daily pain medication use: no   Work/school stressors/changes: no     Precipitating factors:   Does light make it worse: no   Does sound make it worse: no     Alleviating factors:  Does sleep help: no          Therapies Tried and outcome: None      Problem list and histories reviewed & adjusted, as indicated.  Additional history: very severe and sharp when sit up, lean over, son had history of herniation into brainstem, no treatment required.    Patient Active Problem List   Diagnosis     Hypothyroidism     Undiagnosed cardiac murmurs     Anemia     Hip pain     CARDIOVASCULAR SCREENING; LDL GOAL LESS THAN 160     Celiac disease     Joint pains     Fatty liver disease, nonalcoholic     Past Surgical History:   Procedure Laterality Date     C HIP ARTHROSCOPY, DX  6/08    (R) Anterior superior labral debridement.      ESOPHAGOSCOPY, GASTROSCOPY, DUODENOSCOPY (EGD), COMBINED  11/1/2010    EGD     THYROIDECTOMY   2002    S/p thyroidectomy 2002       Social History   Substance Use Topics     Smoking status: Never Smoker     Smokeless tobacco: Never Used     Alcohol use Yes       Comment: occasional- been two years     Family History   Problem Relation Age of Onset     DIABETES Maternal Grandmother      Alcohol/Drug Maternal Grandmother      GASTROINTESTINAL DISEASE Paternal Grandmother      IBS     Hypertension Paternal Grandmother      CEREBROVASCULAR DISEASE Paternal Grandmother      Alzheimer Disease Paternal Grandmother      Hypertension Father      Arthritis Father      HEART DISEASE Father      cardimyopthy age 60, arrythmias     Lipids Father      Neurologic Disorder Sister      brain tumor     GASTROINTESTINAL DISEASE Mother      celiac     C.A.D. No family hx of      Breast Cancer No family hx of      Cancer - colorectal No family hx of          Current Outpatient Prescriptions   Medication Sig Dispense Refill     SUMAtriptan (IMITREX) 50 MG tablet Take 1 tablet (50 mg) by mouth at onset of headache for migraine May repeat in 2 hours. Max 4 tablets/24 hours. 9 tablet 1     levothyroxine (SYNTHROID/LEVOTHROID) 125 MCG tablet Take 1 tablet (125 mcg) by mouth daily 90 tablet 3     VITAMIN D, CHOLECALCIFEROL, PO Take 1,000 Units by mouth daily       Allergies   Allergen Reactions     Gluten Other (See Comments)     Celiac disease     Macrobid [Nitrofuran Derivatives] Nausea and Vomiting     Zithromax [Azithromycin Dihydrate] Nausea and Vomiting     Recent Labs   Lab Test  05/18/17   1328  04/08/17   0940  04/04/17   1200  03/10/16   1130   07/18/13   0957   04/26/12   0910   LDL   --    --    --    --    --   115   --   90   HDL   --    --    --    --    --   37*   --   38*   TRIG   --    --    --    --    --   133   --   120   ALT  30   --   43  24   < >  197*   < >   --    CR  0.68  0.72  0.70  0.67   < >   --    < >   --    GFRESTIMATED  >90  Non  GFR Calc    >90  Non  GFR Calc    >90  Non  GFR Calc    >90  Non  GFR Calc     < >   --    < >   --    GFRESTBLACK  >90   GFR Calc    >90   GFR  "Calc    >90   GFR Calc    >90   GFR Calc     < >   --    < >   --    POTASSIUM  3.6  3.9  4.2  3.4   < >   --    < >   --    TSH  1.14   --   8.43*  1.87   < >   --    < >  0.02*    < > = values in this interval not displayed.      BP Readings from Last 3 Encounters:   05/18/17 118/78   04/10/17 102/67   04/08/17 102/60    Wt Readings from Last 3 Encounters:   05/18/17 145 lb (65.8 kg)   04/10/17 143 lb 2 oz (64.9 kg)   04/04/17 144 lb (65.3 kg)         ROS:  Constitutional, HEENT, cardiovascular, pulmonary, gi and gu systems are negative, except as otherwise noted.    OBJECTIVE:                                                    /78 (BP Location: Left arm, Patient Position: Chair, Cuff Size: Adult Regular)  Pulse 82  Ht 5' 4\" (1.626 m)  Wt 145 lb (65.8 kg)  BMI 24.89 kg/m2  GENERAL APPEARANCE ADULT: Alert, no acute distress  HENT: Ears and TMs normal, oral mucosa and posterior oropharynx normal  CV: normal rate, regular rhythm, no murmur or gallop  NEURO: Alert, oriented, speech and mentation normal, Cranial nerves 2-12 are normal., Strength normal and symmetrical in upper and lower extremities., Reflexes 2+ and symmetrical at biceps, triceps, brachioradialis, knees and ankles, Gait including heel, toe and tandem gait are normal  PSYCH: mentation appears normal., affect and mood normal  Diagnostic Test Results:  Results for orders placed or performed in visit on 05/18/17   CBC with platelets differential   Result Value Ref Range    WBC 7.6 4.0 - 11.0 10e9/L    RBC Count 4.86 3.8 - 5.2 10e12/L    Hemoglobin 14.3 11.7 - 15.7 g/dL    Hematocrit 41.5 35.0 - 47.0 %    MCV 85 78 - 100 fl    MCH 29.4 26.5 - 33.0 pg    MCHC 34.5 31.5 - 36.5 g/dL    RDW 12.9 10.0 - 15.0 %    Platelet Count 223 150 - 450 10e9/L    Diff Method Automated Method     % Neutrophils 61.5 %    % Lymphocytes 22.4 %    % Monocytes 14.6 %    % Eosinophils 1.1 %    % Basophils 0.4 %    Absolute Neutrophil 4.7 " 1.6 - 8.3 10e9/L    Absolute Lymphocytes 1.7 0.8 - 5.3 10e9/L    Absolute Monocytes 1.1 0.0 - 1.3 10e9/L    Absolute Eosinophils 0.1 0.0 - 0.7 10e9/L    Absolute Basophils 0.0 0.0 - 0.2 10e9/L   Comprehensive metabolic panel   Result Value Ref Range    Sodium 139 133 - 144 mmol/L    Potassium 3.6 3.4 - 5.3 mmol/L    Chloride 105 94 - 109 mmol/L    Carbon Dioxide 28 20 - 32 mmol/L    Anion Gap 6 3 - 14 mmol/L    Glucose 77 70 - 99 mg/dL    Urea Nitrogen 12 7 - 30 mg/dL    Creatinine 0.68 0.52 - 1.04 mg/dL    GFR Estimate >90  Non  GFR Calc   >60 mL/min/1.7m2    GFR Estimate If Black >90   GFR Calc   >60 mL/min/1.7m2    Calcium 8.3 (L) 8.5 - 10.1 mg/dL    Bilirubin Total 0.9 0.2 - 1.3 mg/dL    Albumin 4.1 3.4 - 5.0 g/dL    Protein Total 7.6 6.8 - 8.8 g/dL    Alkaline Phosphatase 55 40 - 150 U/L    ALT 30 0 - 50 U/L    AST 19 0 - 45 U/L   TSH   Result Value Ref Range    TSH 1.14 0.40 - 4.00 mU/L   T4 FREE   Result Value Ref Range    T4 Free 1.33 0.76 - 1.46 ng/dL          ASSESSMENT/PLAN:                                                    1. Chronic nonintractable headache, unspecified headache type  Trial of migraine medication, if not effective more likely tension headache.  Concerning it is positional and family history of brain herniation (son and others)and strokes.  No neurological findings but if develop seek ED care.  - SUMAtriptan (IMITREX) 50 MG tablet; Take 1 tablet (50 mg) by mouth at onset of headache for migraine May repeat in 2 hours. Max 4 tablets/24 hours.  Dispense: 9 tablet; Refill: 1  - CBC with platelets differential  - Comprehensive metabolic panel  - TSH  - T4 FREE  - NEUROLOGY ADULT REFERRAL    Kirsten Randall MD  [facility]

## 2017-05-18 NOTE — NURSING NOTE
"Chief Complaint   Patient presents with     Headache     ongoing x 3 months       Initial /78 (BP Location: Left arm, Patient Position: Chair, Cuff Size: Adult Regular)  Pulse 82  Ht 5' 4\" (1.626 m)  Wt 145 lb (65.8 kg)  BMI 24.89 kg/m2 Estimated body mass index is 24.89 kg/(m^2) as calculated from the following:    Height as of this encounter: 5' 4\" (1.626 m).    Weight as of this encounter: 145 lb (65.8 kg).  Medication Reconciliation: complete  "

## 2017-05-18 NOTE — PATIENT INSTRUCTIONS
Thank you for choosing [facility].  You may be receiving a survey in the mail from [facility] regarding your visit today.  Please take a few minutes to complete and return the survey to let us know how we are doing.      If you have questions or concerns, please contact us via [employer] or you can contact your care team at [phone].    Our Clinic hours are:  Monday 6:40 am  to 7:00 pm  Tuesday -Friday 6:40 am to 5:00 pm    The [place] outpatient lab hours are:  Monday - Friday 6:10 am to 4:45 pm  Saturdays 7:00 am to 11:00 am  Appointments are required, call [phone]    If you have clinical questions after hours or would like to schedule an appointment,  call the clinic at [phone].

## 2017-05-18 NOTE — MR AVS SNAPSHOT
After Visit Summary   5/18/2017    Kimberley Choe    MRN: [id number]           Patient Information     Date Of Birth          1980        Visit Information        Provider Department      5/18/2017 12:40 PM Kirsten Ranadll MD [facility]        Today's Diagnoses     Chronic nonintractable headache, unspecified headache type    -  1      Care Instructions          Thank you for choosing [facility].  You may be receiving a survey in the mail from [facility] regarding your visit today.  Please take a few minutes to complete and return the survey to let us know how we are doing.      If you have questions or concerns, please contact us via [employer] or you can contact your care team at [phone].    Our Clinic hours are:  Monday 6:40 am  to 7:00 pm  Tuesday -Friday 6:40 am to 5:00 pm    The [place] outpatient lab hours are:  Monday - Friday 6:10 am to 4:45 pm  Saturdays 7:00 am to 11:00 am  Appointments are required, call [phone]    If you have clinical questions after hours or would like to schedule an appointment,  call the clinic at [phone].          Follow-ups after your visit        Additional Services     NEUROLOGY ADULT REFERRAL       Your provider has referred you to: FM: [facility] [phone]   [email or web address]    Reason for Referral: Consult    Please be aware that coverage of these services is subject to the terms and limitations of your health insurance plan.  Call member services at your health plan with any benefit or coverage questions.      Please bring the following with you to your appointment:    (1) Any X-Rays, CTs or MRIs which have been performed.  Contact the facility where they were done to arrange for  prior to your scheduled appointment.    (2) List of current medications  (3) This referral request   (4) Any documents/labs given to you for this referral                  Who to  "contact     If you have questions or need follow up information about today's clinic visit or your schedule please contact [facility] directly at [phone].  Normal or non-critical lab and imaging results will be communicated to you by MyChart, letter or phone within 4 business days after the clinic has received the results. If you do not hear from us within 7 days, please contact the clinic through [employer] or phone. If you have a critical or abnormal lab result, we will notify you by phone as soon as possible.  Submit refill requests through [employer] or call your pharmacy and they will forward the refill request to us. Please allow 3 business days for your refill to be completed.          Additional Information About Your Visit        [employer] Information     [employer] gives you secure access to your electronic health record. If you see a primary care provider, you can also send messages to your care team and make appointments. If you have questions, please call your primary care clinic.  If you do not have a primary care provider, please call [phone] and they will assist you.        Care EveryWhere ID     This is your Care EveryWhere ID. This could be used by other organizations to access your [place] medical records  [id number]        Your Vitals Were     Pulse Height BMI (Body Mass Index)             82 5' 4\" (1.626 m) 24.89 kg/m2          Blood Pressure from Last 3 Encounters:   05/18/17 118/78   04/10/17 102/67   04/08/17 102/60    Weight from Last 3 Encounters:   05/18/17 145 lb (65.8 kg)   04/10/17 143 lb 2 oz (64.9 kg)   04/04/17 144 lb (65.3 kg)              We Performed the Following     CBC with platelets differential     Comprehensive metabolic panel     NEUROLOGY ADULT REFERRAL     T4 FREE     TSH          Today's Medication Changes          These changes are accurate as of: 5/18/17  1:25 PM.  If you have any questions, ask your nurse or doctor.               Start taking these " medicines.        Dose/Directions    SUMAtriptan 50 MG tablet   Commonly known as:  IMITREX   Used for:  Chronic nonintractable headache, unspecified headache type   Started by:  Kirsten Randall MD        Dose:  50 mg   Take 1 tablet (50 mg) by mouth at onset of headache for migraine May repeat in 2 hours. Max 4 tablets/24 hours.   Quantity:  9 tablet   Refills:  1            Where to get your medicines      These medications were sent to [facility] - [place], [place] - [address]  [address] [address]     Phone:  [phone]     SUMAtriptan 50 MG tablet                Primary Care Provider Office Phone # Fax #    Yari Aragon Carlos, APRN [facility] [phone]       [facility] [address] [address]        Thank you!     Thank you for choosing [facility]  for your care. Our goal is always to provide you with excellent care. Hearing back from our patients is one way we can continue to improve our services. Please take a few minutes to complete the written survey that you may receive in the mail after your visit with us. Thank you!             Your Updated Medication List - Protect others around you: Learn how to safely use, store and throw away your medicines at www.disposemymeds.org.          This list is accurate as of: 5/18/17  1:25 PM.  Always use your most recent med list.                   Brand Name Dispense Instructions for use    levothyroxine 125 MCG tablet    SYNTHROID/LEVOTHROID    90 tablet    Take 1 tablet (125 mcg) by mouth daily       SUMAtriptan 50 MG tablet    IMITREX    9 tablet    Take 1 tablet (50 mg) by mouth at onset of headache for migraine May repeat in 2 hours. Max 4 tablets/24 hours.       VITAMIN D (CHOLECALCIFEROL) PO      Take 1,000 Units by mouth daily

## 2017-06-13 ENCOUNTER — PRE VISIT (OUTPATIENT)
Dept: NEUROLOGY | Facility: CLINIC | Age: 37
End: 2017-06-13

## 2017-06-13 NOTE — TELEPHONE ENCOUNTER
1.  Date/reason for appt:6/19/17, Headaches   2.  Referring provider: DOMINIC MUÑOZ  3.  Call to patient (Yes / No - short description): No, referred   4.  Previous care at / records requested from:   [facility]- office notes and imaging are in epic.

## 2017-06-19 ENCOUNTER — OFFICE VISIT (OUTPATIENT)
Dept: NEUROLOGY | Facility: CLINIC | Age: 37
End: 2017-06-19

## 2017-06-19 VITALS
HEIGHT: 64 IN | BODY MASS INDEX: 24.75 KG/M2 | HEART RATE: 73 BPM | DIASTOLIC BLOOD PRESSURE: 75 MMHG | SYSTOLIC BLOOD PRESSURE: 128 MMHG | WEIGHT: 145 LBS

## 2017-06-19 DIAGNOSIS — H93.A3 PULSATILE TINNITUS, BILATERAL: ICD-10-CM

## 2017-06-19 DIAGNOSIS — Z84.89 FAMILY HISTORY OF BRAIN TUMOR: ICD-10-CM

## 2017-06-19 DIAGNOSIS — R51.0 POSITIONAL HEADACHE: Primary | ICD-10-CM

## 2017-06-19 ASSESSMENT — ENCOUNTER SYMPTOMS
HYPOTENSION: 0
MUSCLE CRAMPS: 0
MUSCLE WEAKNESS: 0
STIFFNESS: 1
LEG PAIN: 0
TACHYCARDIA: 0
CHILLS: 0
TINGLING: 0
EYE REDNESS: 1
SMELL DISTURBANCE: 0
DISTURBANCES IN COORDINATION: 0
MEMORY LOSS: 0
FATIGUE: 1
WEIGHT GAIN: 0
SINUS PAIN: 0
LOSS OF CONSCIOUSNESS: 0
NUMBNESS: 0
BACK PAIN: 0
DIZZINESS: 0
SINUS CONGESTION: 0
PALPITATIONS: 0
SKIN CHANGES: 0
DECREASED APPETITE: 0
NAIL CHANGES: 0
ALTERED TEMPERATURE REGULATION: 0
HYPERTENSION: 0
TROUBLE SWALLOWING: 0
SEIZURES: 0
TREMORS: 0
ORTHOPNEA: 0
NECK PAIN: 1
INCREASED ENERGY: 0
PARALYSIS: 0
WEAKNESS: 0
NECK MASS: 0
HOARSE VOICE: 0
SPEECH CHANGE: 0
DOUBLE VISION: 0
POOR WOUND HEALING: 0
FEVER: 0
NIGHT SWEATS: 0
SYNCOPE: 0
POLYDIPSIA: 0
WEIGHT LOSS: 0
CLAUDICATION: 0
EXERCISE INTOLERANCE: 0
EYE WATERING: 0
POLYPHAGIA: 0
TASTE DISTURBANCE: 0
HALLUCINATIONS: 0
JOINT SWELLING: 0
MYALGIAS: 0
ARTHRALGIAS: 1
SLEEP DISTURBANCES DUE TO BREATHING: 0
HEADACHES: 1
LEG SWELLING: 0
EYE IRRITATION: 0
EYE PAIN: 0
LIGHT-HEADEDNESS: 1
SORE THROAT: 0

## 2017-06-19 NOTE — LETTER
"6/19/2017       RE: Kimberley Choe  [id number] [address]     Dear Colleague,    Thank you for referring your patient, Kimberley Choe, to the [facility] NEUROLOGY at [facility]. Please see a copy of my visit note below.    Re: Kimberley Choe      MRN# [id number]  YOB: 1980  Date of Visit:6/19/2017     OUTPATIENT NEUROLOGY VISIT NOTE    Chief Complaint:  Headache evaluation    History of Present Illness  Kimberley Choe is a 36-year-old right-handed presents to the clinic today for headache evaluation. History of hypothyroidism, anemia, celiac disease, nonalcoholic fatty liver disease, GERD   family history for migraine and her sister but her sister also has meningioma and schwannoma? Son who is 11 years old was diagnosed with Chiari after being hospitalized with complex migraine.     Headache History:      Onset History: On April 4th onset a new headache (triggered by combing her hair) sharp pain with vomiting and prompted ED evaluation twice 4/4 and 4/8. Received treatment with relief but headache became dull and worsening with changing positions such as getting off bed, bending over, gardening. Positional headache still persists. Typically patient reports a pressure pain in the occipital area with last 3-4 seconds and feeling \"heartbeating \" in her headache goes down to the baseline pain and cycle reoccurring throughout the day.   Reports similar headaches after her first and second pregnancies     Aura: no visual aura,               Ears -\"swisshing sound in bilateral ears\"    Associated Symptoms:  Dizziness at times, no acute changes, no sensory or motor symptoms. No photo or phone sensitivity.        Description of Headache Pain & Location:  bilateral in the occipital area      Level of Pain during usual headache:  5/10 typically      Level of Pain during the worst headache:  10/10 once      Do headaches interfere with or prevent usual " activities or diminish your productivity at home or work?  Yes - cannot run and has to take things slower.       Treatments Tried: none    Have you needed to utilize the Emergency Room to treat your headache symptoms?  If so, how often and when was the last time used:  Yes - 4/4/2017    ondansetron (ZOFRAN-ODT) ODT tab 4 mg (4 mg Oral Given 4/4/17 1024)   0.9% sodium chloride BOLUS (0 mLs Intravenous Stopped 4/4/17 1353)        What makes your headaches worse or triggers your headaches? Bending over, sitting up, running, getting out of her car, sitting and getting up.      Denies history of head or neck trauma, dizziness, vertigo, loss of consciousness, seizure, double vision, blurred vision, hearing difficulty, speech or swallowing difficulty, weakness or numbness in face, arms or legs, urinary or bowel incontinence, coordination problems or gait difficulty, fever or chills.    Neurodiagnostic Testing  CT HEAD WITHOUT CONTRAST  4/8/2017 10:25 AM     HISTORY: Headache.     COMPARISON: None.     TECHNIQUE: Routine transverse CT images of the head without  intravenous contrast. Radiation dose for this scan was reduced using  automated exposure control, adjustment of the mA and/or kV according  to patient size, or iterative reconstruction technique.     FINDINGS: The ventricles are normal in size, shape and configuration.  The brain parenchyma and subarachnoid spaces are normal. There is no  evidence of intracranial hemorrhage, mass, acute infarct, or anomaly.  The visualized portions of the sinuses and mastoids appear normal. No  fracture is seen.         IMPRESSION: Unremarkable CT scan of the head.     ARTHUR EMMANUEL MD    Lab  Results for MARY FIELDS (MRN [id number]) as of 6/19/2017 11:01   Ref. Range 4/8/2017 12:00   RBC CSF Latest Ref Range: 0 - 2 /uL 2   WBC CSF Latest Ref Range: 0 - 5 /uL 1   Glucose CSF Latest Ref Range: 40 - 70 mg/dL 61   Protein Total CSF Latest Ref Range: 15 - 60 mg/dL 37      Results for MARY FIELDS (MRN [id number]) as of 6/19/2017 11:01   Ref. Range 5/18/2017 13:28   WBC Latest Ref Range: 4.0 - 11.0 10e9/L 7.6   Hemoglobin Latest Ref Range: 11.7 - 15.7 g/dL 14.3   Hematocrit Latest Ref Range: 35.0 - 47.0 % 41.5   Platelet Count Latest Ref Range: 150 - 450 10e9/L 223   RBC Count Latest Ref Range: 3.8 - 5.2 10e12/L 4.86   MCV Latest Ref Range: 78 - 100 fl 85   MCH Latest Ref Range: 26.5 - 33.0 pg 29.4   MCHC Latest Ref Range: 31.5 - 36.5 g/dL 34.5   RDW Latest Ref Range: 10.0 - 15.0 % 12.9   Diff Method Unknown Automated Method   % Neutrophils Latest Units: % 61.5   % Lymphocytes Latest Units: % 22.4   % Monocytes Latest Units: % 14.6   % Eosinophils Latest Units: % 1.1   % Basophils Latest Units: % 0.4   Absolute Neutrophil Latest Ref Range: 1.6 - 8.3 10e9/L 4.7   Absolute Lymphocytes Latest Ref Range: 0.8 - 5.3 10e9/L 1.7   Absolute Monocytes Latest Ref Range: 0.0 - 1.3 10e9/L 1.1   Absolute Eosinophils Latest Ref Range: 0.0 - 0.7 10e9/L 0.1   Absolute Basophils Latest Ref Range: 0.0 - 0.2 10e9/L 0.0     Results for MARY FIELDS (MRN [id number]) as of 6/19/2017 11:01   Ref. Range 4/4/2017 12:50 4/8/2017 09:40   CRP Inflammation Latest Ref Range: 0.0 - 8.0 mg/L  <2.9   HCG Qual Urine Latest Ref Range: NEG  Negative      Results for MARY FIELDS (MRN [id number]) as of 6/19/2017 11:01   Ref. Range 5/18/2017 13:28   Sodium Latest Ref Range: 133 - 144 mmol/L 139   Potassium Latest Ref Range: 3.4 - 5.3 mmol/L 3.6   Chloride Latest Ref Range: 94 - 109 mmol/L 105   Carbon Dioxide Latest Ref Range: 20 - 32 mmol/L 28   Urea Nitrogen Latest Ref Range: 7 - 30 mg/dL 12   Creatinine Latest Ref Range: 0.52 - 1.04 mg/dL 0.68   GFR Estimate Latest Ref Range: >60 mL/min/1.7m2 >90...   GFR Estimate If Black Latest Ref Range: >60 mL/min/1.7m2 >90...   Calcium Latest Ref Range: 8.5 - 10.1 mg/dL 8.3 (L)   Anion Gap Latest Ref Range: 3 - 14 mmol/L 6   Albumin Latest Ref Range: 3.4 - 5.0 g/dL  4.1   Protein Total Latest Ref Range: 6.8 - 8.8 g/dL 7.6   Bilirubin Total Latest Ref Range: 0.2 - 1.3 mg/dL 0.9   Alkaline Phosphatase Latest Ref Range: 40 - 150 U/L 55   ALT Latest Ref Range: 0 - 50 U/L 30   AST Latest Ref Range: 0 - 45 U/L 19   T4 Free Latest Ref Range: 0.76 - 1.46 ng/dL 1.33   TSH Latest Ref Range: 0.40 - 4.00 mU/L 1.14     Past Medical History reviewed and verified with the patient  Headaches pregnancy/postpartum  Past Medical History:   Diagnosis Date     Anemia, unspecified     with pregnancy 6/06     Dysmenorrhea 2/1/2012     GERD (gastroesophageal reflux disease) 1/30/2014     Papanicolaou smear of cervix with low grade squamous intraepithelial lesion (LGSIL) 7/09    Colpo negative     Right hip labral tear 8/24/2006     Unspecified closed fracture of ankle     Left foot     Unspecified disorder of thyroid 10/20/2002    Thyroid disease, goiter nodules, S/p thyroidectomy 2002       Past Surgical History reviewed and verified with the patient\  Past Surgical History:   Procedure Laterality Date     C HIP ARTHROSCOPY, DX  6/08    (R) Anterior superior labral debridement.      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  Social History   Substance Use Topics     Smoking status: Never Smoker     Smokeless tobacco: Never Used     Alcohol use Yes      Comment: occasional- been two years    reviewed and verified with the patient    Current Outpatient Prescriptions   Medication Sig Dispense Refill     levothyroxine (SYNTHROID/LEVOTHROID) 125 MCG tablet Take 1 tablet (125 mcg) by mouth daily 90 tablet 3     VITAMIN D, CHOLECALCIFEROL, PO Take 1,000 Units by mouth daily     reviewed and verified with the patient    Review of  "Systems:  A 12-point ROS including constitutional, eyes, ENT, respiratory, cardiovascular, gastroenterology, genitourinary, integumentary, musculoskeletal, neurology, hematology and psychiatric were all reviewed with the patient and dry eyes, fatigue due to thyroid and recent medication changes, sleeping is good, and other positive, anemia in the past but was corrected with iron infusion as mentioned in the HPI.       General Exam:   /75  Pulse 73  Ht 1.626 m (5' 4\")  Wt 65.8 kg (145 lb)  BMI 24.89 kg/m2  GEN: Awake, NAD; good eye contact, responses appropriately, mild headache 2/10 with sitting  HEENT: Head atraumatic/Normocephalic. Scalp normal. Pupils equally round, 4 mm, reactive to light and accommodation, sclera and conjunctiva normal. Fundoscopic examination reveals normal vessels no papilledema.   Neck: Easily moveable without resistance  Heart: S1/S2 appreciated, RRR, no m/r/g, no carotid bruits  Lungs:Lungs are clear to auscultation bilaterally, no wheezes or crackles.   Neurological Examination:  The patient is alert and oriented times four. Has good attention and concentration.Speech is fluent without dysarthria.   Cranial nerves:  CN I deferred.   CN II: Intact and full visual fields to confrontation bilaterally. Funduscopic exam revealed disc bilaterally.   CN III, IV, VI: EOM intact. There is no nystagmus. Has conjugated gaze. Intact direct and consensual pupillary light reflexes.   CN V: Intact and symmetrical to facial sensation in the V1 through V3 bilaterally.   CN VII: Intact and symmetrical eyebrow and lid raise and eyelid closure, smiles and frown.   CN VIII: Intact to finger rub bilaterally.   CN IX and X: The palates elevates symmetrical. The uvula is midline.   CN XII: The tongue protrudes midline with no atrophy or fasciculations.   Motor exam: The patient has a normal bulk and tone throughout. There is no atrophy, fasciculations, clonus, or abnormal movements appreciated. "   Strength Exam:  5/5 strength at shoulder abduction, elbow flexion or extension, wrist flexion or extension, finger abduction, , hip flexion and extension, knee flexion and extension, and dorsiflexion and plantarflexion bilaterally.   Sensation is intact to light touch and pinprick throughout. Has good vibration and proprioception sensation at great toes bilaterally.   Reflexes are 2+ and symmetrical at biceps, triceps, brachioradialis, patellar, and Achilles.   Negative Babinski with downgoing toes bilaterally.   Coordination reveals finger-nose-finger, rapid alternating movements with normal speed and accuracy.   Station and gait is normal. There is no ataxia. Can walk on the toes, heels, and tandem walk without difficulty. Has good postural reflexes.Has no drift and a negative Romberg. Jackelyn's negative        Assessment and Plan:  Headache positional and pulsating feeling in bilateral ears. Onset in April of 2017 and persistent since then but to the lesser degree. ED evaluation negative. Non focal exam today. Question of possible vascular etiology. Family history of brain tumor and Chiari malformation.    Plan:  Brain MRI and MRA  Follow up after the imaging in 2-3 weeks  No new medications were prescribed today.    I discussed all my recommendation with Kimberley Choe. The patient verbalizes understanding and comfortable with the plan. The patient has our clinic phone number to call with any questions or concerns. All of the patient's questions were answered from the best of my current knowledge. Follow up in    weeks or sooner if needed.      Thank you for letting me be a part of the treatment team for Kimberley Choe    Time spent with pt answering questions, discussing findings, counseling and coordinating care was more than 50% the appointment time, 50  minutes.     MING Talley, [facility]    Brain MRI images reviewed.   MR BRAIN W/O & W CONTRAST, MRA ANGIOGRAM HEAD  W/O CONTRAST  6/26/2017 5:14 PM     History: Headache, Pulsatile tinnitus, bilateral, Family history of  other specified conditions.     Comparison: 4/8/2017     Technique:   MRI brain: Axial diffusion and susceptibility-weighted images of the  whole brain were obtained. Coronal 3D T2-weighted and axial  thin-section T1-weighted images were obtained without contrast, with  focus on the internal auditory canals.  Post-intravenous contrast  (using gadolinium) axial and coronal thin-section T1-weighted images  with fat saturation were also obtained, with focus on the internal  auditory canals, with postcontrast T1-weighted images of the whole  brain as well.     MRA Head:  Using a 3D time-of-flight image acquisition technique, MRA  of the major arteries at the base of the brain was obtained without  intravenous contrast. Three-dimensional reconstructions of the head  MRA were created, which were reviewed by the radiologist.     Contrast: 6.5mL gadavist      Findings:   MRI brain:     The 7th/8th cranial nerves and labyrinthine structures are normal.     Normal brain parenchymal signal and morphology. No intracranial  hemorrhage, mass effect, midline shift or abnormal extra axial fluid  collection. Ventricles are not enlarged. Patent major intracranial  vascular flow voids. Trace right mastoid air cell fluid. Paranasal  sinuses are clear. Normal marrow signal. Orbits are unremarkable.     MRA [place]: Widely patent major intracranial arteries. No  aneurysm, stenosis or vascular anomaly. The anterior and posterior  communicating arteries are patent.          Impression:  1. Normal brain MRI. No findings to explain patient's symptoms. Normal  labyrinthine structures and 7th/8th cranial nerves.  2. Normal head MRA. No aneurysm or vascular anomaly.     I have personally reviewed the examination and initial interpretation  and I agree with the findings.     ARTHUR CLARK MD      Again, thank you for allowing me  to participate in the care of your patient.      Sincerely,    MING Ruby CNP

## 2017-06-19 NOTE — PROGRESS NOTES
"Re: Kimberley Choe      MRN# [id number]  YOB: 1980  Date of Visit:6/19/2017     OUTPATIENT NEUROLOGY VISIT NOTE    Chief Complaint:  Headache evaluation    History of Present Illness  Kimberley Choe is a 36-year-old right-handed presents to the clinic today for headache evaluation. History of hypothyroidism, anemia, celiac disease, nonalcoholic fatty liver disease, GERD   family history for migraine and her sister but her sister also has meningioma and schwannoma? Son who is 11 years old was diagnosed with Chiari after being hospitalized with complex migraine.     Headache History:      Onset History: On April 4th onset a new headache (triggered by combing her hair) sharp pain with vomiting and prompted ED evaluation twice 4/4 and 4/8. Received treatment with relief but headache became dull and worsening with changing positions such as getting off bed, bending over, gardening. Positional headache still persists. Typically patient reports a pressure pain in the occipital area with last 3-4 seconds and feeling \"heartbeating \" in her headache goes down to the baseline pain and cycle reoccurring throughout the day.   Reports similar headaches after her first and second pregnancies     Aura: no visual aura,               Ears -\"swisshing sound in bilateral ears\"    Associated Symptoms:  Dizziness at times, no acute changes, no sensory or motor symptoms. No photo or phone sensitivity.        Description of Headache Pain & Location:  bilateral in the occipital area      Level of Pain during usual headache:  5/10 typically      Level of Pain during the worst headache:  10/10 once      Do headaches interfere with or prevent usual activities or diminish your productivity at home or work?  Yes - cannot run and has to take things slower.       Treatments Tried: none    Have you needed to utilize the Emergency Room to treat your headache symptoms?  If so, how often and when was the last time used:  Yes - " 4/4/2017    ondansetron (ZOFRAN-ODT) ODT tab 4 mg (4 mg Oral Given 4/4/17 1024)   0.9% sodium chloride BOLUS (0 mLs Intravenous Stopped 4/4/17 1353)        What makes your headaches worse or triggers your headaches? Bending over, sitting up, running, getting out of her car, sitting and getting up.      Denies history of head or neck trauma, dizziness, vertigo, loss of consciousness, seizure, double vision, blurred vision, hearing difficulty, speech or swallowing difficulty, weakness or numbness in face, arms or legs, urinary or bowel incontinence, coordination problems or gait difficulty, fever or chills.    Neurodiagnostic Testing  CT HEAD WITHOUT CONTRAST  4/8/2017 10:25 AM     HISTORY: Headache.     COMPARISON: None.     TECHNIQUE: Routine transverse CT images of the head without  intravenous contrast. Radiation dose for this scan was reduced using  automated exposure control, adjustment of the mA and/or kV according  to patient size, or iterative reconstruction technique.     FINDINGS: The ventricles are normal in size, shape and configuration.  The brain parenchyma and subarachnoid spaces are normal. There is no  evidence of intracranial hemorrhage, mass, acute infarct, or anomaly.  The visualized portions of the sinuses and mastoids appear normal. No  fracture is seen.         IMPRESSION: Unremarkable CT scan of the head.     ARTHUR EMMANUEL MD    Lab  Results for MARY FIELDS (MRN [id number]) as of 6/19/2017 11:01   Ref. Range 4/8/2017 12:00   RBC CSF Latest Ref Range: 0 - 2 /uL 2   WBC CSF Latest Ref Range: 0 - 5 /uL 1   Glucose CSF Latest Ref Range: 40 - 70 mg/dL 61   Protein Total CSF Latest Ref Range: 15 - 60 mg/dL 37     Results for MARY FIELDS (MRN [id number]) as of 6/19/2017 11:01   Ref. Range 5/18/2017 13:28   WBC Latest Ref Range: 4.0 - 11.0 10e9/L 7.6   Hemoglobin Latest Ref Range: 11.7 - 15.7 g/dL 14.3   Hematocrit Latest Ref Range: 35.0 - 47.0 % 41.5   Platelet Count Latest Ref Range:  150 - 450 10e9/L 223   RBC Count Latest Ref Range: 3.8 - 5.2 10e12/L 4.86   MCV Latest Ref Range: 78 - 100 fl 85   MCH Latest Ref Range: 26.5 - 33.0 pg 29.4   MCHC Latest Ref Range: 31.5 - 36.5 g/dL 34.5   RDW Latest Ref Range: 10.0 - 15.0 % 12.9   Diff Method Unknown Automated Method   % Neutrophils Latest Units: % 61.5   % Lymphocytes Latest Units: % 22.4   % Monocytes Latest Units: % 14.6   % Eosinophils Latest Units: % 1.1   % Basophils Latest Units: % 0.4   Absolute Neutrophil Latest Ref Range: 1.6 - 8.3 10e9/L 4.7   Absolute Lymphocytes Latest Ref Range: 0.8 - 5.3 10e9/L 1.7   Absolute Monocytes Latest Ref Range: 0.0 - 1.3 10e9/L 1.1   Absolute Eosinophils Latest Ref Range: 0.0 - 0.7 10e9/L 0.1   Absolute Basophils Latest Ref Range: 0.0 - 0.2 10e9/L 0.0     Results for MARY FIELDS (MRN [id number]) as of 6/19/2017 11:01   Ref. Range 4/4/2017 12:50 4/8/2017 09:40   CRP Inflammation Latest Ref Range: 0.0 - 8.0 mg/L  <2.9   HCG Qual Urine Latest Ref Range: NEG  Negative      Results for MARY FIELDS (MRN [id number]) as of 6/19/2017 11:01   Ref. Range 5/18/2017 13:28   Sodium Latest Ref Range: 133 - 144 mmol/L 139   Potassium Latest Ref Range: 3.4 - 5.3 mmol/L 3.6   Chloride Latest Ref Range: 94 - 109 mmol/L 105   Carbon Dioxide Latest Ref Range: 20 - 32 mmol/L 28   Urea Nitrogen Latest Ref Range: 7 - 30 mg/dL 12   Creatinine Latest Ref Range: 0.52 - 1.04 mg/dL 0.68   GFR Estimate Latest Ref Range: >60 mL/min/1.7m2 >90...   GFR Estimate If Black Latest Ref Range: >60 mL/min/1.7m2 >90...   Calcium Latest Ref Range: 8.5 - 10.1 mg/dL 8.3 (L)   Anion Gap Latest Ref Range: 3 - 14 mmol/L 6   Albumin Latest Ref Range: 3.4 - 5.0 g/dL 4.1   Protein Total Latest Ref Range: 6.8 - 8.8 g/dL 7.6   Bilirubin Total Latest Ref Range: 0.2 - 1.3 mg/dL 0.9   Alkaline Phosphatase Latest Ref Range: 40 - 150 U/L 55   ALT Latest Ref Range: 0 - 50 U/L 30   AST Latest Ref Range: 0 - 45 U/L 19   T4 Free Latest Ref Range: 0.76 - 1.46  ng/dL 1.33   TSH Latest Ref Range: 0.40 - 4.00 mU/L 1.14     Past Medical History reviewed and verified with the patient  Headaches pregnancy/postpartum  Past Medical History:   Diagnosis Date     Anemia, unspecified     with pregnancy 6/06     Dysmenorrhea 2/1/2012     GERD (gastroesophageal reflux disease) 1/30/2014     Papanicolaou smear of cervix with low grade squamous intraepithelial lesion (LGSIL) 7/09    Colpo negative     Right hip labral tear 8/24/2006     Unspecified closed fracture of ankle     Left foot     Unspecified disorder of thyroid 10/20/2002    Thyroid disease, goiter nodules, S/p thyroidectomy 2002       Past Surgical History reviewed and verified with the patient\  Past Surgical History:   Procedure Laterality Date     C HIP ARTHROSCOPY, DX  6/08    (R) Anterior superior labral debridement.      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  Social History   Substance Use Topics     Smoking status: Never Smoker     Smokeless tobacco: Never Used     Alcohol use Yes      Comment: occasional- been two years    reviewed and verified with the patient    Current Outpatient Prescriptions   Medication Sig Dispense Refill     levothyroxine (SYNTHROID/LEVOTHROID) 125 MCG tablet Take 1 tablet (125 mcg) by mouth daily 90 tablet 3     VITAMIN D, CHOLECALCIFEROL, PO Take 1,000 Units by mouth daily     reviewed and verified with the patient    Review of Systems:  A 12-point ROS including constitutional, eyes, ENT, respiratory, cardiovascular, gastroenterology, genitourinary, integumentary, musculoskeletal, neurology, hematology and psychiatric were all reviewed with the patient and dry eyes, fatigue due to thyroid and recent  "medication changes, sleeping is good, and other positive, anemia in the past but was corrected with iron infusion as mentioned in the HPI.       General Exam:   /75  Pulse 73  Ht 1.626 m (5' 4\")  Wt 65.8 kg (145 lb)  BMI 24.89 kg/m2  GEN: Awake, NAD; good eye contact, responses appropriately, mild headache 2/10 with sitting  HEENT: Head atraumatic/Normocephalic. Scalp normal. Pupils equally round, 4 mm, reactive to light and accommodation, sclera and conjunctiva normal. Fundoscopic examination reveals normal vessels no papilledema.   Neck: Easily moveable without resistance  Heart: S1/S2 appreciated, RRR, no m/r/g, no carotid bruits  Lungs:Lungs are clear to auscultation bilaterally, no wheezes or crackles.   Neurological Examination:  The patient is alert and oriented times four. Has good attention and concentration.Speech is fluent without dysarthria.   Cranial nerves:  CN I deferred.   CN II: Intact and full visual fields to confrontation bilaterally. Funduscopic exam revealed disc bilaterally.   CN III, IV, VI: EOM intact. There is no nystagmus. Has conjugated gaze. Intact direct and consensual pupillary light reflexes.   CN V: Intact and symmetrical to facial sensation in the V1 through V3 bilaterally.   CN VII: Intact and symmetrical eyebrow and lid raise and eyelid closure, smiles and frown.   CN VIII: Intact to finger rub bilaterally.   CN IX and X: The palates elevates symmetrical. The uvula is midline.   CN XII: The tongue protrudes midline with no atrophy or fasciculations.   Motor exam: The patient has a normal bulk and tone throughout. There is no atrophy, fasciculations, clonus, or abnormal movements appreciated.   Strength Exam:  5/5 strength at shoulder abduction, elbow flexion or extension, wrist flexion or extension, finger abduction, , hip flexion and extension, knee flexion and extension, and dorsiflexion and plantarflexion bilaterally.   Sensation is intact to light touch and " pinprick throughout. Has good vibration and proprioception sensation at great toes bilaterally.   Reflexes are 2+ and symmetrical at biceps, triceps, brachioradialis, patellar, and Achilles.   Negative Babinski with downgoing toes bilaterally.   Coordination reveals finger-nose-finger, rapid alternating movements with normal speed and accuracy.   Station and gait is normal. There is no ataxia. Can walk on the toes, heels, and tandem walk without difficulty. Has good postural reflexes.Has no drift and a negative Romberg. Lhermitte's negative        Assessment and Plan:  Headache positional and pulsating feeling in bilateral ears. Onset in April of 2017 and persistent since then but to the lesser degree. ED evaluation negative. Non focal exam today. Question of possible vascular etiology. Family history of brain tumor and Chiari malformation.    Plan:  Brain MRI and MRA  Follow up after the imaging in 2-3 weeks  No new medications were prescribed today.    I discussed all my recommendation with Kimberley Choe. The patient verbalizes understanding and comfortable with the plan. The patient has our clinic phone number to call with any questions or concerns. All of the patient's questions were answered from the best of my current knowledge. Follow up in    weeks or sooner if needed.      Thank you for letting me be a part of the treatment team for Kimberley Choe    Time spent with pt answering questions, discussing findings, counseling and coordinating care was more than 50% the appointment time, 50  minutes.     MING Talley, [facility]    Brain MRI images reviewed.   MR BRAIN W/O & W CONTRAST, MRA ANGIOGRAM HEAD W/O CONTRAST  6/26/2017 5:14 PM     History: Headache, Pulsatile tinnitus, bilateral, Family history of  other specified conditions.     Comparison: 4/8/2017     Technique:   MRI brain: Axial diffusion and susceptibility-weighted images of the  whole brain were obtained. Coronal  3D T2-weighted and axial  thin-section T1-weighted images were obtained without contrast, with  focus on the internal auditory canals.  Post-intravenous contrast  (using gadolinium) axial and coronal thin-section T1-weighted images  with fat saturation were also obtained, with focus on the internal  auditory canals, with postcontrast T1-weighted images of the whole  brain as well.     MRA Head:  Using a 3D time-of-flight image acquisition technique, MRA  of the major arteries at the base of the brain was obtained without  intravenous contrast. Three-dimensional reconstructions of the head  MRA were created, which were reviewed by the radiologist.     Contrast: 6.5mL gadavist      Findings:   MRI brain:     The 7th/8th cranial nerves and labyrinthine structures are normal.     Normal brain parenchymal signal and morphology. No intracranial  hemorrhage, mass effect, midline shift or abnormal extra axial fluid  collection. Ventricles are not enlarged. Patent major intracranial  vascular flow voids. Trace right mastoid air cell fluid. Paranasal  sinuses are clear. Normal marrow signal. Orbits are unremarkable.     MRA [place]: Widely patent major intracranial arteries. No  aneurysm, stenosis or vascular anomaly. The anterior and posterior  communicating arteries are patent.          Impression:  1. Normal brain MRI. No findings to explain patient's symptoms. Normal  labyrinthine structures and 7th/8th cranial nerves.  2. Normal head MRA. No aneurysm or vascular anomaly.     I have personally reviewed the examination and initial interpretation  and I agree with the findings.     ARTHUR CLARK MD    Answers for HPI/ROS submitted by the patient on 6/19/2017   General Symptoms: Yes  Skin Symptoms: Yes  HENT Symptoms: Yes  EYE SYMPTOMS: Yes  HEART SYMPTOMS: Yes  LUNG SYMPTOMS: No  INTESTINAL SYMPTOMS: No  URINARY SYMPTOMS: No  GYNECOLOGIC SYMPTOMS: No  BREAST SYMPTOMS: No  SKELETAL SYMPTOMS: Yes  BLOOD SYMPTOMS:  No  NERVOUS SYSTEM SYMPTOMS: Yes  MENTAL HEALTH SYMPTOMS: No  Fever: No  Loss of appetite: No  Weight loss: No  Weight gain: No  Fatigue: Yes  Night sweats: No  Chills: No  Increased stress: No  Excessive hunger: No  Excessive thirst: No  Feeling hot or cold when others believe the temperature is normal: No  Loss of height: No  Post-operative complications: No  Surgical site pain: No  Hallucinations: No  Change in or Loss of Energy: No  Hyperactivity: No  Confusion: No  Changes in hair: No  Changes in moles/birth marks: No  Itching: No  Rashes: No  Changes in nails: No  Acne: Yes  Hair in places you don't want it: No  Change in facial hair: No  Warts: No  Non-healing sores: No  Scarring: No  Flaking of skin: No  Color changes of hands/feet in cold : No  Sun sensitivity: Yes  Skin thickening: No  Ear pain: No  Ear discharge: No  Hearing loss: No  Tinnitus: Yes  Nosebleeds: No  Congestion: No  Sinus pain: No  Trouble swallowing: No   Voice hoarseness: No  Mouth sores: No  Sore throat: No  Tooth pain: No  Gum tenderness: No  Bleeding gums: No  Change in taste: No  Change in sense of smell: No  Dry mouth: No  Hearing aid used: No  Neck lump: No  Eye pain: No  Vision loss: No  Dry eyes: Yes  Watery eyes: No  Eye bulging: No  Double vision: No  Flashing of lights: No  Spots: No  Floaters: No  Redness: Yes  Crossed eyes: No  Tunnel Vision: No  Yellowing of eyes: No  Eye irritation: No  Chest pain or pressure: No  Fast or irregular heartbeat: No  Pain in legs with walking: No  Swelling in feet or ankles: No  Trouble breathing while lying down: No  Fingers or Toes appear blue: No  High blood pressure: No  Low blood pressure: No  Fainting: No  Murmurs: No  Chest pain on exertion: No  Chest pain at rest: No  Cramping pain in leg during exercise: No  Pacemaker: No  Varicose veins: No  Edema or swelling: No  Fast heart beat: No  Wake up at night with shortness of breath: No  Heart flutters: No  Light-headedness: Yes  Exercise  intolerance: No  Back pain: No  Muscle aches: No  Neck pain: Yes  Swollen joints: No  Joint pain: Yes  Bone pain: No  Muscle cramps: No  Muscle weakness: No  Joint stiffness: Yes  Bone fracture: No  Trouble with coordination: No  Dizziness or trouble with balance: No  Fainting or black-out spells: No  Memory loss: No  Headache: Yes  Seizures: No  Speech problems: No  Tingling: No  Tremor: No  Weakness: No  Difficulty walking: No  Paralysis: No  Numbness: No

## 2017-06-19 NOTE — MR AVS SNAPSHOT
After Visit Summary   6/19/2017    Kimberley Choe    MRN: [id number]           Patient Information     Date Of Birth          1980        Visit Information        Provider Department      6/19/2017 10:30 AM Jesusita Fong APRN [facility] Neurology        Today's Diagnoses     Positional headache    -  1    Pulsatile tinnitus, bilateral        Family history of brain tumor          Care Instructions    Plan:  Brain MRI and MRA  Follow up after the imaging in 2-3 weeks          Follow-ups after your visit        Your next 10 appointments already scheduled     Jun 26, 2017  4:00 PM CDT   (Arrive by 3:45 PM)   MR HEAD W/O & W CONTRAST ANGIOGRAM with [id number]   [facility] MRI ([facility])    [address]  [facility] [address] [phone]           Take your medicines as usual, unless your doctor tells you not to. Bring a list of your current medicines to your exam (including vitamins, minerals and over-the-counter drugs).  You will be given intravenous contrast for this exam. To prepare:   The day before your exam, drink extra fluids at least six 8-ounce glasses (unless your doctor tells you to restrict your fluids).   Have a blood test (creatinine test) within 30 days of your exam. Go to your clinic or Diagnostic Imaging Department for this test.  The MRI machine uses a strong magnet. Please wear clothes without metal (snaps, zippers). A sweatsuit works well, or we may give you a hospital gown.  Please remove any body piercings and hair extensions before you arrive. You will also remove watches, jewelry, hairpins, wallets, dentures, partial dental plates and hearing aids. You may wear contact lenses, and you may be able to wear your rings. We have a safe place to keep your personal items, but it is safer to leave them at home.   **IMPORTANT** THE INSTRUCTIONS BELOW ARE ONLY FOR THOSE PATIENTS WHO HAVE BEEN TOLD THEY  WILL RECEIVE SEDATION OR GENERAL ANESTHESIA DURING THEIR MRI PROCEDURE:  IF YOU WILL RECEIVE SEDATION (take medicine to help you relax during your exam):   You must get the medicine from your doctor before you arrive. Bring the medicine to the exam. Do not take it at home.   Arrive one hour early. Bring someone who can take you home after the test. Your medicine will make you sleepy. After the exam, you may not drive, take a bus or take a taxi by yourself.   No eating 8 hours before your exam. You may have clear liquids up until 4 hours before your exam. (Clear liquids include water, clear tea, black coffee and fruit juice without pulp.)  IF YOU WILL RECEIVE ANESTHESIA (be asleep for your exam):   Arrive 1 1/2 hours early. Bring someone who can take you home after the test. You may not drive, take a bus or take a taxi by yourself.   No eating 8 hours before your exam. You may have clear liquids up until 4 hours before your exam. (Clear liquids include water, clear tea, black coffee and fruit juice without pulp.)  Please call the Imaging Department at your exam site with any questions.            Jun 26, 2017  4:45 PM CDT   (Arrive by 4:30 PM)   MR BRAIN W/O & W CONTRAST with [id number]   [facility] MRI ([facility])    [address] [address] [phone]           Take your medicines as usual, unless your doctor tells you not to. Bring a list of your current medicines to your exam (including vitamins, minerals and over-the-counter drugs).  You will be given intravenous contrast for this exam. To prepare:   The day before your exam, drink extra fluids at least six 8-ounce glasses (unless your doctor tells you to restrict your fluids).   Have a blood test (creatinine test) within 30 days of your exam. Go to your clinic or Diagnostic Imaging Department for this test.  The MRI machine uses a strong magnet. Please wear clothes without metal (snaps,  zippers). A sweatsuit works well, or we may give you a hospital gown.  Please remove any body piercings and hair extensions before you arrive. You will also remove watches, jewelry, hairpins, wallets, dentures, partial dental plates and hearing aids. You may wear contact lenses, and you may be able to wear your rings. We have a safe place to keep your personal items, but it is safer to leave them at home.   **IMPORTANT** THE INSTRUCTIONS BELOW ARE ONLY FOR THOSE PATIENTS WHO HAVE BEEN TOLD THEY WILL RECEIVE SEDATION OR GENERAL ANESTHESIA DURING THEIR MRI PROCEDURE:  IF YOU WILL RECEIVE SEDATION (take medicine to help you relax during your exam):   You must get the medicine from your doctor before you arrive. Bring the medicine to the exam. Do not take it at home.   Arrive one hour early. Bring someone who can take you home after the test. Your medicine will make you sleepy. After the exam, you may not drive, take a bus or take a taxi by yourself.   No eating 8 hours before your exam. You may have clear liquids up until 4 hours before your exam. (Clear liquids include water, clear tea, black coffee and fruit juice without pulp.)  IF YOU WILL RECEIVE ANESTHESIA (be asleep for your exam):   Arrive 1 1/2 hours early. Bring someone who can take you home after the test. You may not drive, take a bus or take a taxi by yourself.   No eating 8 hours before your exam. You may have clear liquids up until 4 hours before your exam. (Clear liquids include water, clear tea, black coffee and fruit juice without pulp.)  Please call the Imaging Department at your exam site with any questions.            Jul 24, 2017 12:30 PM CDT   (Arrive by 12:15 PM)   Return Visit with MING [facility] Neurology ([facility])    [address] [facility]  [address]rd [facility] [address] [phone]              Future tests that were ordered for you today     Open Future Orders         "Priority Expected Expires Ordered    MR Brain w/o & w Contrast Routine  6/19/2018 6/19/2017    MRI Angiogram head w & w/o contrast Routine  6/19/2018 6/19/2017            Who to contact     Please call your clinic at [phone] to:    Ask questions about your health    Make or cancel appointments    Discuss your medicines    Learn about your test results    Speak to your doctor   If you have compliments or concerns about an experience at your clinic, or if you wish to file a complaint, please contact [facility] Patient Relations at [phone] or email us at [email or web address]         Additional Information About Your Visit        Ewire[employer]gear[employer]Bring Light Information     The Micro gives you secure access to your electronic health record. If you see a primary care provider, you can also send messages to your care team and make appointments. If you have questions, please call your primary care clinic.  If you do not have a primary care provider, please call [phone] and they will assist you.      The Micro is an electronic gateway that provides easy, online access to your medical records. With The Micro, you can request a clinic appointment, read your test results, renew a prescription or communicate with your care team.     To access your existing account, please contact your [facility] Clinic or call [phone] for assistance.        Care EveryWhere ID     This is your Care EveryWhere ID. This could be used by other organizations to access your [place] medical records  [id number]        Your Vitals Were     Pulse Height BMI (Body Mass Index)             73 1.626 m (5' 4\") 24.89 kg/m2          Blood Pressure from Last 3 Encounters:   06/19/17 128/75   05/18/17 118/78   04/10/17 102/67    Weight from Last 3 Encounters:   06/19/17 65.8 kg (145 lb)   05/18/17 65.8 kg (145 lb)   04/10/17 64.9 kg (143 lb 2 oz)                 Today's Medication Changes          These " changes are accurate as of: 6/19/17 12:08 PM.  If you have any questions, ask your nurse or doctor.               Stop taking these medicines if you haven't already. Please contact your care team if you have questions.     SUMAtriptan 50 MG tablet   Commonly known as:  IMITREX   Stopped by:  Jesusita Fong APRN CNP                    Primary Care Provider Office Phone # Fax #    Yari MING Coleman [phone]       [address] [address]        Thank you!     Thank you for choosing [facility] NEUROLOGY  for your care. Our goal is always to provide you with excellent care. Hearing back from our patients is one way we can continue to improve our services. Please take a few minutes to complete the written survey that you may receive in the mail after your visit with us. Thank you!             Your Updated Medication List - Protect others around you: Learn how to safely use, store and throw away your medicines at www.disposemymeds.org.          This list is accurate as of: 6/19/17 12:08 PM.  Always use your most recent med list.                   Brand Name Dispense Instructions for use    levothyroxine 125 MCG tablet    SYNTHROID/LEVOTHROID    90 tablet    Take 1 tablet (125 mcg) by mouth daily       VITAMIN D (CHOLECALCIFEROL) PO      Take 1,000 Units by mouth daily

## 2017-08-30 ENCOUNTER — OFFICE VISIT (OUTPATIENT)
Dept: DERMATOLOGY | Facility: CLINIC | Age: 37
End: 2017-08-30
Payer: COMMERCIAL

## 2017-08-30 VITALS — DIASTOLIC BLOOD PRESSURE: 80 MMHG | SYSTOLIC BLOOD PRESSURE: 129 MMHG | OXYGEN SATURATION: 100 % | HEART RATE: 87 BPM

## 2017-08-30 DIAGNOSIS — L91.8 INFLAMED SKIN TAG: ICD-10-CM

## 2017-08-30 DIAGNOSIS — L81.4 LENTIGO: Primary | ICD-10-CM

## 2017-08-30 DIAGNOSIS — D18.01 CHERRY ANGIOMA: ICD-10-CM

## 2017-08-30 DIAGNOSIS — D22.9 MULTIPLE BENIGN NEVI: ICD-10-CM

## 2017-08-30 PROCEDURE — 99203 OFFICE O/P NEW LOW 30 MIN: CPT | Mod: 25 | Performed by: PHYSICIAN ASSISTANT

## 2017-08-30 PROCEDURE — 17110 DESTRUCTION B9 LES UP TO 14: CPT | Performed by: PHYSICIAN ASSISTANT

## 2017-08-30 NOTE — NURSING NOTE
"Chief Complaint   Patient presents with     Derm Problem     skin check. has a lot of freckles and has never been checked before       Initial /80 (BP Location: Right arm, Patient Position: Sitting, Cuff Size: Adult Regular)  Pulse 87  SpO2 100% Estimated body mass index is 24.89 kg/(m^2) as calculated from the following:    Height as of 6/19/17: 1.626 m (5' 4\").    Weight as of 6/19/17: 65.8 kg (145 lb).  Medication Reconciliation: complete    "

## 2017-08-30 NOTE — MR AVS SNAPSHOT
After Visit Summary   8/30/2017    Kimberley Choe    MRN: [id number]           Patient Information     Date Of Birth          1980        Visit Information        Provider Department      8/30/2017 9:40 AM Indiana Solis PA-C [facility]        Care Instructions    WOUND CARE INSTRUCTIONS   FOR CRYOSURGERY   This area treated with liquid nitrogen will form a blister. You do not need to bandage the area until after the blister forms and breaks (which may be a few days). When the blister breaks, begin daily dressing changes as follows:   1) Clean and dry the area with tap water using clean Q-tip or sterile gauze pad.   2) Apply Polysporin ointment or Bacitracin ointment over entire wound. Do NOT use Neosporin ointment.   3) Cover the wound with a band-aid or sterile non-stick gauze pad and micropore paper tape.   REPEAT THESE INSTRUCTIONS AT LEAST ONCE A DAY UNTIL THE WOUND HAS COMPLETELY HEALED.   It is an old wives tale that a wound heals better when it is exposed to air and allowed to dry out. The wound will heal faster with a better cosmetic result if it is kept moist with ointment and covered with a bandage.   Do not let the wound dry out.   IMPORTANT INFORMATION ON REVERSE SIDE   Supplies Needed:   *Cotton tipped applicators (Q-tips)   *Polysporin ointment or Bacitracin ointment (NOT NEOSPORIN)   *Band-aids, or non stick gauze pads and micropore paper tape   PATIENT INFORMATION   During the healing process you will notice a number of changes. All wounds develop a small halo of redness surrounding the wound. This means healing is occurring. Severe itching with extensive redness usually indicates sensitivity to the ointment or bandage tape used to dress the wound. You should call our office if this develops.   Swelling and/or discoloration around your surgical site is common, particularly when performed around the eye.   All wounds normally drain. The larger the wound the  more drainage there will be. After 7-10 days, you will notice the wound beginning to shrink and new skin will begin to grow. The wound is healed when you can see skin has formed over the entire area. A healed wound has a healthy, shiny look to the surface and is red to dark pink in color to normalize. Wounds may take approximately 4-6 weeks to heal. Larger wounds may take 6-8 weeks. After the wound is healed you may discontinue dressing changes.   You may experience a sensation of tightness as your wound heals. This is normal and will gradually subside.   Your healed wound may be sensitive to temperature changes. This sensitivity improves with time, but if you re having a lot of discomfort, try to avoid temperature extremes.   Patients frequently experience itching after their wound appears to have healed because of the continue healing under the skin. Plain Vaseline will help relieve the itching.                 Follow-ups after your visit        Your next 10 appointments already scheduled     Aug 31, 2017  7:40 AM CDT   [facility]Chart [facility] Randall [facility]   [facility]    [address] [facility] [address] [phone]              Who to contact     If you have questions or need follow up information about today's clinic visit or your schedule please contact [facility] directly at [phone].  Normal or non-critical lab and imaging results will be communicated to you by MyChart, letter or phone within 4 business days after the clinic has received the results. If you do not hear from us within 7 days, please contact the clinic through MyChart or phone. If you have a critical or abnormal lab result, we will notify you by phone as soon as possible.  Submit refill requests through [employer] or call your pharmacy and they will forward the refill request to us. Please allow 3 business days for your refill to be completed.          Additional  Information About Your Visit        BoomTownhart Information     [employer] gives you secure access to your electronic health record. If you see a primary care provider, you can also send messages to your care team and make appointments. If you have questions, please call your primary care clinic.  If you do not have a primary care provider, please call [phone] and they will assist you.        Care EveryWhere ID     This is your Care EveryWhere ID. This could be used by other organizations to access your [facility] medical records  [id number]        Your Vitals Were     Pulse Pulse Oximetry                87 100%           Blood Pressure from Last 3 Encounters:   08/30/17 129/80   06/19/17 128/75   05/18/17 118/78    Weight from Last 3 Encounters:   06/19/17 65.8 kg (145 lb)   05/18/17 65.8 kg (145 lb)   04/10/17 64.9 kg (143 lb 2 oz)              Today, you had the following     No orders found for display       Primary Care Provider Office Phone # Fax #    Yari Gonzalez, APRN [facility] [phone] [address] [facility] [address]        Equal Access to Services     [place] SHULTZ : Hadii aad ku hadasho Soomaali, waaxda luqadaha, qaybta kaalmada adeegyada, darío bourgeois haytobyn vianca coleman . So [facility] [phone].    ATENCIÓN: Si habla español, tiene a santoro disposición servicios gratuitos de asistencia lingüística. Llame al [phone].    We comply with applicable federal civil rights laws and [place] laws. We do not discriminate on the basis of race, color, national origin, age, disability sex, sexual orientation or gender identity.            Thank you!     Thank you for choosing [facility]  for your care. Our goal is always to provide you with excellent care. Hearing back from our patients is one way we can continue to improve our services. Please take a few minutes to complete the written survey that you may receive in the mail after your visit with us. Thank you!              Your Updated Medication List - Protect others around you: Learn how to safely use, store and throw away your medicines at www.disposemymeds.org.          This list is accurate as of: 8/30/17 10:17 AM.  Always use your most recent med list.                   Brand Name Dispense Instructions for use Diagnosis    levothyroxine 125 MCG tablet    SYNTHROID/LEVOTHROID    90 tablet    Take 1 tablet (125 mcg) by mouth daily    Postoperative hypothyroidism       VITAMIN D (CHOLECALCIFEROL) PO      Take 1,000 Units by mouth daily

## 2017-09-01 NOTE — PROGRESS NOTES
Kimberley Choe is a 37 year old year old female patient here today for skin check. 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 She has had an actinic keratosis on forehead before which resolved after cryo treatment.  She notes some skin tags on axilla that will get caught while shaving.  Patient has no other skin complaints today.  Remainder of the HPI, Meds, PMH, Allergies, FH, and SH was reviewed in chart.    Pertinent Hx:   No personal history of skin cancer.   Past Medical History:   Diagnosis Date     Anemia, unspecified     with pregnancy 6/06     Dysmenorrhea 2/1/2012     GERD (gastroesophageal reflux disease) 1/30/2014     Papanicolaou smear of cervix with low grade squamous intraepithelial lesion (LGSIL) 7/09    Colpo negative     Right hip labral tear 8/24/2006     Unspecified closed fracture of ankle     Left foot     Unspecified disorder of thyroid 10/20/2002    Thyroid disease, goiter nodules, S/p thyroidectomy 2002       Past Surgical History:   Procedure Laterality Date     C HIP ARTHROSCOPY, DX  6/08    (R) Anterior superior labral debridement.      ESOPHAGOSCOPY, GASTROSCOPY, DUODENOSCOPY (EGD), COMBINED  11/1/2010    EGD     THYROIDECTOMY   2002    S/p thyroidectomy 2002        Family History   Problem Relation Age of Onset     DIABETES Maternal Grandmother      Alcohol/Drug Maternal Grandmother      GASTROINTESTINAL DISEASE Paternal Grandmother      IBS     Hypertension Paternal Grandmother      CEREBROVASCULAR DISEASE Paternal Grandmother      Alzheimer Disease Paternal Grandmother      Hypertension Father      Arthritis Father      HEART DISEASE Father      cardimyopthy age 60, arrythmias     Lipids Father      Neurologic Disorder Sister      brain tumor     GASTROINTESTINAL DISEASE Mother      celiac     C.A.D. No family hx of      Breast Cancer No family hx of      Cancer - colorectal No family hx of        Social History     Social History     Marital status:       Spouse name: N/A     Number of children: 2     Years of education: N/A     Occupational History      [employer]     Social History Main Topics     Smoking status: Never Smoker     Smokeless tobacco: Never Used     Alcohol use Yes      Comment: occasional- been two years     Drug use: No     Sexual activity: Yes     Partners: Male     Birth control/ protection: Male Surgical      Comment: Vasectomy     Other Topics Concern     Parent/Sibling W/ Cabg, Mi Or Angioplasty Before 65f 55m? No     Exercise Yes     ride horses     Social History Narrative       Outpatient Encounter Prescriptions as of 8/30/2017   Medication Sig Dispense Refill     levothyroxine (SYNTHROID/LEVOTHROID) 125 MCG tablet Take 1 tablet (125 mcg) by mouth daily 90 tablet 3     VITAMIN D, CHOLECALCIFEROL, PO Take 1,000 Units by mouth daily       No facility-administered encounter medications on file as of 8/30/2017.              Review Of Systems  Skin: As above  Eyes: negative  Ears/Nose/Throat: negative  Respiratory: No shortness of breath, dyspnea on exertion, cough, or hemoptysis  Cardiovascular: negative  Gastrointestinal: negative  Genitourinary: negative  Musculoskeletal: negative  Neurologic: negative  Psychiatric: negative  Hematologic/Lymphatic/Immunologic: negative  Endocrine: negative      O:   NAD, WDWN, Alert & Oriented, Mood & Affect wnl, Vitals stable   Here today alone   /80 (BP Location: Right arm, Patient Position: Sitting, Cuff Size: Adult Regular)  Pulse 87  SpO2 100%   General appearance normal   Vitals stable   Alert, oriented and in no acute distress      Brown papules and macules with regular pigment network and borders    brown macules, and red papules on torso and extremities    Flesh colored pedunculated papules on axilla x 5     The remainder of the detailed exam was unremarkable; the following areas were examined:  scalp/hair, conjunctiva/lids, face, neck, lips/teeth, oral  mucosa/gingiva, chest, back, abdomen, buttocks, digits/nails, RUE, LUE, RLE, LLE.      Eyes: Conjunctivae/lids:Normal     ENT: Lips, buccal mucosa, tongue: normal    MSK:Normal    Pulm: Breathing Normal    Neuro/Psych: Orientation:Normal; Mood/Affect:Normal  A/P:  1. Inflamed skin tags x 5  LN2:  Treated with LN2 for 5s for 1-2 cycles. Warned risks of blistering, pain, pigment change, scarring, and incomplete resolution.  Advised patient to return if lesions do not completely resolve.  Wound care sheet given.  2. lentigo, cherry angioma, benign nevi   BENIGN LESIONS DISCUSSED WITH PATIENT:  I discussed the specifics of tumor, prognosis, and genetics of benign lesions.  I explained that treatment of these lesions would be purely cosmetic and not medically neccessary.  I discussed with patient different removal options including excision, cautery and /or laser.      Nature and genetics of benign skin lesions dicussed with patient.  Signs and Symptoms of skin cancer discussed with patient.  ABCDEs of melanoma reviewed with patient.  Patient encouraged to perform monthly skin exams.  UV precautions reviewed with patient.  Skin care regimen reviewed with patient: Eliminate harsh soaps, i.e. Dial, zest, irsih spring; Mild soaps such as Cetaphil or Dove sensitive skin, avoid hot or cold showers, aggressive use of emollients including vanicream, cetaphil or cerave discussed with patient.    Risks of non-melanoma skin cancer discussed with patient   Return to clinic one year or sooner if needed.

## 2017-09-19 ENCOUNTER — DOCUMENTATION ONLY (OUTPATIENT)
Dept: LAB | Facility: CLINIC | Age: 37
End: 2017-09-19

## 2017-09-19 NOTE — PROGRESS NOTES
Kimberley has a Lab only appointment tomorrow at the [facility].  There are Thyroid tests ordered, but  in 2017.  If you would like those tests performed please EXTEND expiration date on them or review chart and place FUTURE orders for what is needed.  According to Health Maintenance is  due for a Hepatic Panel as well.      Thank you!  [facility]

## 2017-09-21 NOTE — PROGRESS NOTES
Message left on voicemail that no need to repeat labs.  Patient keating not have appt scheduled that I can see here. Marie BERRY RN

## 2017-09-21 NOTE — PROGRESS NOTES
Do not see need to repeat normal tests done in May.  Hepatic and thyroid labs were normal then.  She can cancel the lab appointment.

## 2017-09-25 DIAGNOSIS — E06.3 CHRONIC LYMPHOCYTIC THYROIDITIS: Primary | ICD-10-CM

## 2017-09-25 DIAGNOSIS — R53.83 FATIGUE: ICD-10-CM

## 2017-09-26 DIAGNOSIS — E06.3 CHRONIC LYMPHOCYTIC THYROIDITIS: ICD-10-CM

## 2017-09-26 DIAGNOSIS — R53.83 FATIGUE: ICD-10-CM

## 2017-09-26 LAB
ALBUMIN SERPL-MCNC: 3.9 G/DL (ref 3.4–5)
ALP SERPL-CCNC: 56 U/L (ref 40–150)
ALT SERPL W P-5'-P-CCNC: 21 U/L (ref 0–50)
ANION GAP SERPL CALCULATED.3IONS-SCNC: 6 MMOL/L (ref 3–14)
AST SERPL W P-5'-P-CCNC: 12 U/L (ref 0–45)
BASOPHILS # BLD AUTO: 0 10E9/L (ref 0–0.2)
BASOPHILS NFR BLD AUTO: 0.2 %
BILIRUB SERPL-MCNC: 1.4 MG/DL (ref 0.2–1.3)
BUN SERPL-MCNC: 12 MG/DL (ref 7–30)
CALCIUM SERPL-MCNC: 8.7 MG/DL (ref 8.5–10.1)
CHLORIDE SERPL-SCNC: 105 MMOL/L (ref 94–109)
CO2 SERPL-SCNC: 27 MMOL/L (ref 20–32)
CREAT SERPL-MCNC: 0.79 MG/DL (ref 0.52–1.04)
CRP SERPL-MCNC: <2.9 MG/L (ref 0–8)
DIFFERENTIAL METHOD BLD: NORMAL
EOSINOPHIL # BLD AUTO: 0.1 10E9/L (ref 0–0.7)
EOSINOPHIL NFR BLD AUTO: 0.9 %
ERYTHROCYTE [DISTWIDTH] IN BLOOD BY AUTOMATED COUNT: 13.6 % (ref 10–15)
FERRITIN SERPL-MCNC: 11 NG/ML (ref 12–150)
GFR SERPL CREATININE-BSD FRML MDRD: 82 ML/MIN/1.7M2
GGT SERPL-CCNC: 19 U/L (ref 0–40)
GLUCOSE SERPL-MCNC: 86 MG/DL (ref 70–99)
HCT VFR BLD AUTO: 40.6 % (ref 35–47)
HGB BLD-MCNC: 14.1 G/DL (ref 11.7–15.7)
LYMPHOCYTES # BLD AUTO: 1 10E9/L (ref 0.8–5.3)
LYMPHOCYTES NFR BLD AUTO: 17.4 %
MCH RBC QN AUTO: 29.5 PG (ref 26.5–33)
MCHC RBC AUTO-ENTMCNC: 34.7 G/DL (ref 31.5–36.5)
MCV RBC AUTO: 85 FL (ref 78–100)
MONOCYTES # BLD AUTO: 0.7 10E9/L (ref 0–1.3)
MONOCYTES NFR BLD AUTO: 12.1 %
NEUTROPHILS # BLD AUTO: 4.1 10E9/L (ref 1.6–8.3)
NEUTROPHILS NFR BLD AUTO: 69.4 %
PLATELET # BLD AUTO: 193 10E9/L (ref 150–450)
POTASSIUM SERPL-SCNC: 4.1 MMOL/L (ref 3.4–5.3)
PROT SERPL-MCNC: 7.1 G/DL (ref 6.8–8.8)
RBC # BLD AUTO: 4.78 10E12/L (ref 3.8–5.2)
SODIUM SERPL-SCNC: 138 MMOL/L (ref 133–144)
T3FREE SERPL-MCNC: 2.6 PG/ML (ref 2.3–4.2)
VIT B12 SERPL-MCNC: 674 PG/ML (ref 193–986)
WBC # BLD AUTO: 5.9 10E9/L (ref 4–11)

## 2017-09-26 PROCEDURE — 80053 COMPREHEN METABOLIC PANEL: CPT | Performed by: CHIROPRACTOR

## 2017-09-26 PROCEDURE — 86140 C-REACTIVE PROTEIN: CPT | Performed by: CHIROPRACTOR

## 2017-09-26 PROCEDURE — 82607 VITAMIN B-12: CPT | Performed by: CHIROPRACTOR

## 2017-09-26 PROCEDURE — 83090 ASSAY OF HOMOCYSTEINE: CPT | Performed by: CHIROPRACTOR

## 2017-09-26 PROCEDURE — 99000 SPECIMEN HANDLING OFFICE-LAB: CPT | Performed by: CHIROPRACTOR

## 2017-09-26 PROCEDURE — 84481 FREE ASSAY (FT-3): CPT | Performed by: CHIROPRACTOR

## 2017-09-26 PROCEDURE — 82728 ASSAY OF FERRITIN: CPT | Performed by: CHIROPRACTOR

## 2017-09-26 PROCEDURE — 85025 COMPLETE CBC W/AUTO DIFF WBC: CPT | Performed by: CHIROPRACTOR

## 2017-09-26 PROCEDURE — 84482 T3 REVERSE: CPT | Mod: 90 | Performed by: CHIROPRACTOR

## 2017-09-26 PROCEDURE — 82306 VITAMIN D 25 HYDROXY: CPT | Performed by: CHIROPRACTOR

## 2017-09-26 PROCEDURE — 82977 ASSAY OF GGT: CPT | Performed by: CHIROPRACTOR

## 2017-09-26 PROCEDURE — 36415 COLL VENOUS BLD VENIPUNCTURE: CPT | Performed by: CHIROPRACTOR

## 2017-09-27 LAB
DEPRECATED CALCIDIOL+CALCIFEROL SERPL-MC: 19 UG/L (ref 20–75)
HCYS SERPL-SCNC: 5.1 UMOL/L (ref 4–12)

## 2017-09-30 LAB — T3REVERSE SERPL-MCNC: 19.1 NG/DL (ref 9–27)

## 2017-10-18 ENCOUNTER — RADIANT APPOINTMENT (OUTPATIENT)
Dept: GENERAL RADIOLOGY | Facility: CLINIC | Age: 37
End: 2017-10-18
Attending: FAMILY MEDICINE
Payer: COMMERCIAL

## 2017-10-18 ENCOUNTER — OFFICE VISIT (OUTPATIENT)
Dept: FAMILY MEDICINE | Facility: CLINIC | Age: 37
End: 2017-10-18
Payer: COMMERCIAL

## 2017-10-18 VITALS
SYSTOLIC BLOOD PRESSURE: 124 MMHG | WEIGHT: 143.2 LBS | TEMPERATURE: 97.8 F | HEART RATE: 82 BPM | BODY MASS INDEX: 24.45 KG/M2 | DIASTOLIC BLOOD PRESSURE: 78 MMHG | HEIGHT: 64 IN | OXYGEN SATURATION: 97 %

## 2017-10-18 DIAGNOSIS — M25.532 LEFT WRIST PAIN: Primary | ICD-10-CM

## 2017-10-18 DIAGNOSIS — A69.20 ERYTHEMA MIGRANS (LYME DISEASE): ICD-10-CM

## 2017-10-18 PROCEDURE — 73110 X-RAY EXAM OF WRIST: CPT | Mod: LT

## 2017-10-18 PROCEDURE — 99214 OFFICE O/P EST MOD 30 MIN: CPT | Performed by: FAMILY MEDICINE

## 2017-10-18 RX ORDER — DOXYCYCLINE HYCLATE 100 MG
100 TABLET ORAL 2 TIMES DAILY
Qty: 28 TABLET | Refills: 0 | Status: SHIPPED | OUTPATIENT
Start: 2017-10-18 | End: 2017-11-01

## 2017-10-18 NOTE — PROGRESS NOTES
SUBJECTIVE:   Kimberley Choe is a 37 year old female who presents to clinic today for the following health issues:      Joint Pain    Onset: x2 months     Description:   Location: left wrist   Character: sore; increased pain with movement or pressure    Intensity: moderate    Progression of Symptoms: same    Accompanying Signs & Symptoms:  Other symptoms: none    History:   Previous similar pain: no       Precipitating factors:   Trauma or overuse: no     Alleviating factors:  Improved by: nothing    Therapies Tried and outcome: tried using wrist brace at home- did not feel like it helped     Tick Bite x 2 days ago  Located on right side abdomen.  Bulls eye around bite, painful. Body was pulled out- deer tick.       Denies having any fever or chills.     Problem list and histories reviewed & adjusted, as indicated.  Additional history: as documented    Patient Active Problem List   Diagnosis     Hypothyroidism     Undiagnosed cardiac murmurs     Anemia     Hip pain     CARDIOVASCULAR SCREENING; LDL GOAL LESS THAN 160     Celiac disease     Joint pains     Fatty liver disease, nonalcoholic     Thyrotoxicosis     Family history of congenital heart disease     Vitamin D deficiency     Past Surgical History:   Procedure Laterality Date     C HIP ARTHROSCOPY, DX  6/08    (R) Anterior superior labral debridement.      ESOPHAGOSCOPY, GASTROSCOPY, DUODENOSCOPY (EGD), COMBINED  11/1/2010    EGD     THYROIDECTOMY   2002    S/p thyroidectomy 2002       Social History   Substance Use Topics     Smoking status: Never Smoker     Smokeless tobacco: Never Used     Alcohol use Yes      Comment: occasional- been two years     Family History   Problem Relation Age of Onset     DIABETES Maternal Grandmother      Alcohol/Drug Maternal Grandmother      GASTROINTESTINAL DISEASE Paternal Grandmother      IBS     Hypertension Paternal Grandmother      CEREBROVASCULAR DISEASE Paternal Grandmother      Alzheimer Disease Paternal  "Grandmother      Hypertension Father      Arthritis Father      HEART DISEASE Father      cardimyopthy age 60, arrythmias     Lipids Father      Neurologic Disorder Sister      brain tumor     GASTROINTESTINAL DISEASE Mother      celiac     C.A.D. No family hx of      Breast Cancer No family hx of      Cancer - colorectal No family hx of          Current Outpatient Prescriptions   Medication Sig Dispense Refill     Ferrous Sulfate (IRON SUPPLEMENT PO) Take 325 mg by mouth       doxycycline (VIBRA-TABS) 100 MG tablet Take 1 tablet (100 mg) by mouth 2 times daily for 14 days 28 tablet 0     levothyroxine (SYNTHROID/LEVOTHROID) 125 MCG tablet Take 1 tablet (125 mcg) by mouth daily 90 tablet 3     VITAMIN D, CHOLECALCIFEROL, PO Take 1,000 Units by mouth daily       Allergies   Allergen Reactions     Gluten Other (See Comments)     Celiac disease     Macrobid [Nitrofuran Derivatives] Nausea and Vomiting     Zithromax [Azithromycin Dihydrate] Nausea and Vomiting         Reviewed and updated as needed this visit by clinical staffTobacco  Allergies  Meds  Problems       Reviewed and updated as needed this visit by Provider         ROS:  Constitutional, HEENT, cardiovascular, pulmonary, gi and gu systems are negative, except as otherwise noted.      OBJECTIVE:   /78  Pulse 82  Temp 97.8  F (36.6  C) (Tympanic)  Ht 5' 4\" (1.626 m)  Wt 143 lb 3.2 oz (65 kg)  SpO2 97%  Breastfeeding? No  BMI 24.58 kg/m2  Body mass index is 24.58 kg/(m^2).  GENERAL: healthy, alert and no distress  MS: no gross musculoskeletal defects noted, no edema. Tenderness of the dorsal aspect of the left wrist, no overlying skin changes. No swelling/effusion. FROM  SKIN: Erythematous lesion measuring about 3.5 x 2.5 cm with a bull's eye appearance on the right lateral aspect of the abdominal wall.     Diagnostic Test Results:  X ray in process    ASSESSMENT/PLAN:     Kimberley was seen today for insect bites and wrist pain.    Diagnoses and " all orders for this visit:    Left wrist pain  -     XR Wrist Left G/E 3 Views  In the interim continue with R.I.C.E therapy + OTC NSAIDs as needed    Erythema migrans (Lyme disease)  -     Area was cleaned with alcohol swab, using toothed pick-ups, the bite area was explored for any retained tick bite particles. Patient tolerated this well. Bacitracin and band aid was applied.   -   doxycycline (VIBRA-TABS) 100 MG tablet; Take 1 tablet (100 mg) by mouth 2 times daily for 14 days    Patient education and Handout given       Follow up if symptoms fail to improve or worsen.      The patient was in agreement with the plan today and had no questions or concerns prior to leaving the clinic.        Coco Lepe MD  [facility]

## 2017-10-18 NOTE — PATIENT INSTRUCTIONS
"  Tick Bites  Ticks are small arachnids that feed on the blood of rodents, rabbits, birds, deer, dogs, and people. A tick bite may cause a reaction like a spider bite. You may have redness, itching, and slight swelling at the site. Sometimes you may have no reaction where the tick bit you.  Ticks may gorge themselves for days before you find and remove them. The bites themselves aren't cause for concern. But ticks can carry and pass on illnesses such as Lyme disease and [place] spotted fever. Both diseases begin with a rash and symptoms similar to the flu. In advanced stages, these diseases can be quite serious.     A \"bull's eye\" rash is a common symptom of Lyme disease.   When to go to the emergency department (ED)  Not all ticks carry disease. And a tick must stay attached for at least 24 hours to infect you. If you find a tick, don't panic. Try to carefully remove it with tweezers. Grasp the tick near its head and pull without twisting. If you can't easily dislodge the tick or if you leave the head in your skin, get medical care right away.  What to expect in the ED    The tick or any parts of the tick will be removed and the bite will be cleaned.    To prevent disease, you may be given antibiotics. Both Lyme disease and [place] spotted fever respond quickly to these medicines.    You may be asked to see your healthcare provider for a blood test to check for Lyme disease.  Follow-up care  Some states and [facility] have services that test ticks for Lyme disease and other diseases. Check with your local officials to see if this service is available in your area.  If you remove a tick yourself, watch for signs of a tick-borne illness. Symptoms may show up within a few days or weeks after a bite. Call your healthcare provider if you notice any of the following:    Rash. The rash may spread outward in a ring from a hard white lump. Or it may move up your arms and legs to your chest.    Chills and " fever    Body aches and joint pain    Severe headache  Date Last Reviewed: 12/1/2016 2000-2017 [employer]. [address] [place] [address], [place] [address]. All rights reserved. This information is not intended as a substitute for professional medical care. Always follow your healthcare professional's instructions.

## 2017-10-18 NOTE — NURSING NOTE
"Chief Complaint   Patient presents with     Insect Bites     Wrist Pain       Initial /78  Pulse 82  Temp 97.8  F (36.6  C) (Tympanic)  Ht 5' 4\" (1.626 m)  Wt 143 lb 3.2 oz (65 kg)  SpO2 97%  Breastfeeding? No  BMI 24.58 kg/m2 Estimated body mass index is 24.58 kg/(m^2) as calculated from the following:    Height as of this encounter: 5' 4\" (1.626 m).    Weight as of this encounter: 143 lb 3.2 oz (65 kg).  Medication Reconciliation: complete       Rosa Schmidt MA      "

## 2017-10-18 NOTE — MR AVS SNAPSHOT
"              After Visit Summary   10/18/2017    Kimberley Choe    MRN: [id number]           Patient Information     Date Of Birth          1980        Visit Information        Provider Department      10/18/2017 2:30 PM Coco Lepe MD [facility]        Today's Diagnoses     Left wrist pain    -  1    Erythema migrans (Lyme disease)          Care Instructions      Tick Bites  Ticks are small arachnids that feed on the blood of rodents, rabbits, birds, deer, dogs, and people. A tick bite may cause a reaction like a spider bite. You may have redness, itching, and slight swelling at the site. Sometimes you may have no reaction where the tick bit you.  Ticks may gorge themselves for days before you find and remove them. The bites themselves aren't cause for concern. But ticks can carry and pass on illnesses such as Lyme disease and [place] spotted fever. Both diseases begin with a rash and symptoms similar to the flu. In advanced stages, these diseases can be quite serious.     A \"bull's eye\" rash is a common symptom of Lyme disease.   When to go to the emergency department (ED)  Not all ticks carry disease. And a tick must stay attached for at least 24 hours to infect you. If you find a tick, don't panic. Try to carefully remove it with tweezers. Grasp the tick near its head and pull without twisting. If you can't easily dislodge the tick or if you leave the head in your skin, get medical care right away.  What to expect in the ED    The tick or any parts of the tick will be removed and the bite will be cleaned.    To prevent disease, you may be given antibiotics. Both Lyme disease and [place] spotted fever respond quickly to these medicines.    You may be asked to see your healthcare provider for a blood test to check for Lyme disease.  Follow-up care  Some states and counties have services that test ticks for Lyme disease and other diseases. Check with your local officials to " see if this service is available in your area.  If you remove a tick yourself, watch for signs of a tick-borne illness. Symptoms may show up within a few days or weeks after a bite. Call your healthcare provider if you notice any of the following:    Rash. The rash may spread outward in a ring from a hard white lump. Or it may move up your arms and legs to your chest.    Chills and fever    Body aches and joint pain    Severe headache  Date Last Reviewed: 12/1/2016 2000-2017 [employer]. [address] [place] [address]. All rights reserved. This information is not intended as a substitute for professional medical care. Always follow your healthcare professional's instructions.                Follow-ups after your visit        Follow-up notes from your care team     Return if symptoms worsen or fail to improve.      Who to contact     Normal or non-critical lab and imaging results will be communicated to you by [employer], letter or phone within 4 business days after the clinic has received the results. If you do not hear from us within 7 days, please contact the clinic through [employer] or phone. If you have a critical or abnormal lab result, we will notify you by phone as soon as possible.  Submit refill requests through [employer] or call your pharmacy and they will forward the refill request to us. Please allow 3 business days for your refill to be completed.          If you need to speak with a  for additional information , please call: [phone]             Additional Information About Your Visit        [employer] Information     [employer] gives you secure access to your electronic health record. If you see a primary care provider, you can also send messages to your care team and make appointments. If you have questions, please call your primary care clinic.  If you do not have a primary care provider, please call [phone] and they will assist you.        Care EveryWhere ID  "    This is your Care EveryWhere ID. This could be used by other organizations to access your [facility] medical records  [id number]        Your Vitals Were     Pulse Temperature Height Pulse Oximetry Breastfeeding? BMI (Body Mass Index)    82 97.8  F (36.6  C) (Tympanic) 5' 4\" (1.626 m) 97% No 24.58 kg/m2       Blood Pressure from Last 3 Encounters:   10/18/17 124/78   08/30/17 129/80   06/19/17 128/75    Weight from Last 3 Encounters:   10/18/17 143 lb 3.2 oz (65 kg)   06/19/17 145 lb (65.8 kg)   05/18/17 145 lb (65.8 kg)              We Performed the Following     XR Wrist Left G/E 3 Views          Today's Medication Changes          These changes are accurate as of: 10/18/17  3:38 PM.  If you have any questions, ask your nurse or doctor.               Start taking these medicines.        Dose/Directions    doxycycline 100 MG tablet   Commonly known as:  VIBRA-TABS   Used for:  Erythema migrans (Lyme disease)   Started by:  Coco Lepe MD        Dose:  100 mg   Take 1 tablet (100 mg) by mouth 2 times daily for 14 days   Quantity:  28 tablet   Refills:  0            Where to get your medicines      These medications were sent to [facility] - [place], [place] - [address]  [address] [address]     Phone:  [phone]     doxycycline 100 MG tablet                Primary Care Provider    Physician No Ref-Primary       NO REF-PRIMARY PHYSICIAN        Equal Access to Services     [place] SH[place] AH: Hadwisam Louis, waaxda luyungadaha, qaybta kaalmadarío melendrez. So [facility] [phone].    ATENCIÓN: Si habla vicky, tiene a santoro disposición servicios gratuitos de asistencia lingüística. Llame al [phone].    We comply with applicable federal civil rights laws and [place] laws. We do not discriminate on the basis of race, color, national origin, age, disability, sex, sexual orientation, or gender identity.            Thank " you!     Thank you for choosing [facility]  for your care. Our goal is always to provide you with excellent care. Hearing back from our patients is one way we can continue to improve our services. Please take a few minutes to complete the written survey that you may receive in the mail after your visit with us. Thank you!             Your Updated Medication List - Protect others around you: Learn how to safely use, store and throw away your medicines at www.disposemymeds.org.          This list is accurate as of: 10/18/17  3:38 PM.  Always use your most recent med list.                   Brand Name Dispense Instructions for use Diagnosis    doxycycline 100 MG tablet    VIBRA-TABS    28 tablet    Take 1 tablet (100 mg) by mouth 2 times daily for 14 days    Erythema migrans (Lyme disease)       IRON SUPPLEMENT PO      Take 325 mg by mouth        levothyroxine 125 MCG tablet    SYNTHROID/LEVOTHROID    90 tablet    Take 1 tablet (125 mcg) by mouth daily    Postoperative hypothyroidism       VITAMIN D (CHOLECALCIFEROL) PO      Take 1,000 Units by mouth daily

## 2017-12-12 ASSESSMENT — ENCOUNTER SYMPTOMS
BRUISES/BLEEDS EASILY: 0
SINUS PAIN: 0
SMELL DISTURBANCE: 0
WEAKNESS: 0
WEIGHT GAIN: 0
PARALYSIS: 0
MUSCLE WEAKNESS: 0
ALTERED TEMPERATURE REGULATION: 0
HEADACHES: 1
SPEECH CHANGE: 0
POLYPHAGIA: 0
TREMORS: 0
DECREASED APPETITE: 0
LOSS OF CONSCIOUSNESS: 0
SINUS CONGESTION: 0
MYALGIAS: 1
DISTURBANCES IN COORDINATION: 0
MEMORY LOSS: 0
NIGHT SWEATS: 0
NECK PAIN: 0
HOARSE VOICE: 0
SORE THROAT: 0
NECK MASS: 0
MUSCLE CRAMPS: 0
JOINT SWELLING: 0
FEVER: 0
HALLUCINATIONS: 0
TASTE DISTURBANCE: 0
INCREASED ENERGY: 1
CHILLS: 0
WEIGHT LOSS: 0
SEIZURES: 0
NUMBNESS: 0
STIFFNESS: 1
SWOLLEN GLANDS: 0
TROUBLE SWALLOWING: 0
BACK PAIN: 0
DIZZINESS: 0
FATIGUE: 1
ARTHRALGIAS: 1
TINGLING: 0
POLYDIPSIA: 0

## 2017-12-13 ENCOUNTER — ONCOLOGY VISIT (OUTPATIENT)
Dept: ONCOLOGY | Facility: CLINIC | Age: 37
End: 2017-12-13
Attending: INTERNAL MEDICINE
Payer: COMMERCIAL

## 2017-12-13 VITALS
BODY MASS INDEX: 24.24 KG/M2 | RESPIRATION RATE: 18 BRPM | HEART RATE: 82 BPM | WEIGHT: 142 LBS | OXYGEN SATURATION: 97 % | SYSTOLIC BLOOD PRESSURE: 126 MMHG | TEMPERATURE: 97.6 F | DIASTOLIC BLOOD PRESSURE: 81 MMHG | HEIGHT: 64 IN

## 2017-12-13 DIAGNOSIS — D50.9 IRON DEFICIENCY ANEMIA, UNSPECIFIED IRON DEFICIENCY ANEMIA TYPE: Primary | ICD-10-CM

## 2017-12-13 DIAGNOSIS — D50.9 IRON DEFICIENCY ANEMIA, UNSPECIFIED IRON DEFICIENCY ANEMIA TYPE: ICD-10-CM

## 2017-12-13 LAB
BASOPHILS # BLD AUTO: 0.1 10E9/L (ref 0–0.2)
BASOPHILS NFR BLD AUTO: 0.9 %
CRP SERPL-MCNC: <2.9 MG/L (ref 0–8)
DIFFERENTIAL METHOD BLD: ABNORMAL
EOSINOPHIL # BLD AUTO: 0.1 10E9/L (ref 0–0.7)
EOSINOPHIL NFR BLD AUTO: 1.5 %
ERYTHROCYTE [DISTWIDTH] IN BLOOD BY AUTOMATED COUNT: 12.6 % (ref 10–15)
FERRITIN SERPL-MCNC: 11 NG/ML (ref 12–150)
HCT VFR BLD AUTO: 44.5 % (ref 35–47)
HGB BLD-MCNC: 15.2 G/DL (ref 11.7–15.7)
IMM GRANULOCYTES # BLD: 0 10E9/L (ref 0–0.4)
IMM GRANULOCYTES NFR BLD: 0.2 %
IRON SATN MFR SERPL: 23 % (ref 15–46)
IRON SERPL-MCNC: 82 UG/DL (ref 35–180)
LYMPHOCYTES # BLD AUTO: 1.1 10E9/L (ref 0.8–5.3)
LYMPHOCYTES NFR BLD AUTO: 21.5 %
MCH RBC QN AUTO: 29.1 PG (ref 26.5–33)
MCHC RBC AUTO-ENTMCNC: 34.2 G/DL (ref 31.5–36.5)
MCV RBC AUTO: 85 FL (ref 78–100)
MONOCYTES # BLD AUTO: 0.5 10E9/L (ref 0–1.3)
MONOCYTES NFR BLD AUTO: 9.4 %
NEUTROPHILS # BLD AUTO: 3.5 10E9/L (ref 1.6–8.3)
NEUTROPHILS NFR BLD AUTO: 66.5 %
NRBC # BLD AUTO: 0 10*3/UL
NRBC BLD AUTO-RTO: 0 /100
PLATELET # BLD AUTO: 203 10E9/L (ref 150–450)
RBC # BLD AUTO: 5.22 10E12/L (ref 3.8–5.2)
RETICS # AUTO: 91.9 10E9/L (ref 25–95)
RETICS/RBC NFR AUTO: 1.8 % (ref 0.5–2)
TIBC SERPL-MCNC: 364 UG/DL (ref 240–430)
WBC # BLD AUTO: 5.3 10E9/L (ref 4–11)

## 2017-12-13 PROCEDURE — 84238 ASSAY NONENDOCRINE RECEPTOR: CPT | Performed by: INTERNAL MEDICINE

## 2017-12-13 PROCEDURE — 99213 OFFICE O/P EST LOW 20 MIN: CPT | Mod: ZF

## 2017-12-13 PROCEDURE — 99204 OFFICE O/P NEW MOD 45 MIN: CPT | Mod: ZP | Performed by: INTERNAL MEDICINE

## 2017-12-13 PROCEDURE — 86140 C-REACTIVE PROTEIN: CPT | Performed by: INTERNAL MEDICINE

## 2017-12-13 PROCEDURE — 85045 AUTOMATED RETICULOCYTE COUNT: CPT | Performed by: INTERNAL MEDICINE

## 2017-12-13 PROCEDURE — 85025 COMPLETE CBC W/AUTO DIFF WBC: CPT | Performed by: INTERNAL MEDICINE

## 2017-12-13 PROCEDURE — 83550 IRON BINDING TEST: CPT | Performed by: INTERNAL MEDICINE

## 2017-12-13 PROCEDURE — 82728 ASSAY OF FERRITIN: CPT | Performed by: INTERNAL MEDICINE

## 2017-12-13 PROCEDURE — 83540 ASSAY OF IRON: CPT | Performed by: INTERNAL MEDICINE

## 2017-12-13 PROCEDURE — 36415 COLL VENOUS BLD VENIPUNCTURE: CPT | Performed by: INTERNAL MEDICINE

## 2017-12-13 RX ORDER — SUMATRIPTAN 50 MG/1
50 TABLET, FILM COATED ORAL PRN
COMMUNITY
Start: 2017-09-18 | End: 2019-03-11

## 2017-12-13 ASSESSMENT — PAIN SCALES - GENERAL: PAINLEVEL: NO PAIN (0)

## 2017-12-13 NOTE — LETTER
2017       RE: Kimberley Choe  [address] [facility] [address]     Dear Colleague,    Thank you for referring your patient, Kimberley Choe, to the [facility]. Please see a copy of my visit note below.    PROBLEM LIST:    1. H/o iron deficiency anemia.  2. Gluten enteropathy (celiac disease), confirmed by EGD and bix on 11/1/10  3. , at approximately 34 weeks.  4. Status post near total thyroidectomy 10/15/2002 for multinodular goiter. Left residual tissue  5. Status post arthroscopic hip surgery .     INTERIM HISTORY:  The patient is here for follow-up of iron deficiency.  I last saw her in .  She has received INFeD on one occasion on 2010 for iron deficiency anemia.  She has celiac disease which he says is under good control.  Occasional abdominal pain, no diarrhea.  She follows strict gluten-free diet.  Lately she has been having headaches has been diagnosed with migraines although she says she does not know if Imitrex helps because she just vomited up.  She feels like her ears are ringing with a rushing sound.  She has fatigue but no pica.  Her hair is not thinning.  Menstrual periods are very regular and last 4-5 days never has any clotting or need to use pads or tampons every hour.  She has some ongoing body aches for which there is never really been made a diagnosis.  She had a sinus infection in the spring and says she feels like she never quite recovered from it.  She also had Lyme disease in the fall with a classic bull's-eye lesion and was treated with 2 weeks of doxycycline.    She has bilateral carpal tunnel syndrome, helped by exercises.     Answers for HPI/ROS submitted by the patient on 2017   General Symptoms: Yes  Skin Symptoms: No  HENT Symptoms: Yes  EYE SYMPTOMS: No  HEART SYMPTOMS: No  LUNG SYMPTOMS: No  INTESTINAL SYMPTOMS: No  URINARY SYMPTOMS: No  GYNECOLOGIC SYMPTOMS: No  BREAST SYMPTOMS: No  SKELETAL SYMPTOMS: Yes  BLOOD  "SYMPTOMS: Yes  NERVOUS SYSTEM SYMPTOMS: Yes  MENTAL HEALTH SYMPTOMS: No  Fever: No  Loss of appetite: No  Weight loss: No  Weight gain: No  Fatigue: Yes  Night sweats: No  Chills: No  Increased stress: No  Excessive hunger: No  Excessive thirst: No  Feeling hot or cold when others believe the temperature is normal: No  Loss of height: No  Post-operative complications: No  Surgical site pain: No  Hallucinations: No  Change in or Loss of Energy: Yes  Hyperactivity: No  Confusion: No  Ear pain: Yes  Ear discharge: No  Hearing loss: No  Tinnitus: Yes  Nosebleeds: No  Congestion: No  Sinus pain: No  Trouble swallowing: No   Voice hoarseness: No  Mouth sores: No  Sore throat: No  Tooth pain: No  Gum tenderness: No  Bleeding gums: No  Change in taste: No  Change in sense of smell: No  Dry mouth: No  Hearing aid used: No  Neck lump: No  Back pain: No  Muscle aches: Yes  Neck pain: No  Swollen joints: No  Joint pain: Yes  Bone pain: No  Muscle cramps: No  Muscle weakness: No  Joint stiffness: Yes  Bone fracture: No  Anemia: Yes  Swollen glands: No  Easy bleeding or bruising: No  Edema or swelling: No  Trouble with coordination: No  Dizziness or trouble with balance: No  Fainting or black-out spells: No  Memory loss: No  Headache: Yes  Seizures: No  Speech problems: No  Tingling: No  Tremor: No  Weakness: No  Difficulty walking: No  Paralysis: No  Numbness: No      PHYSICAL EXAMINATION:  /81  Pulse 82  Temp 97.6  F (36.4  C) (Tympanic)  Resp 18  Ht 1.626 m (5' 4.02\")  Wt 64.4 kg (142 lb)  LMP 11/29/2017  SpO2 97%  BMI 24.36 kg/m2    General appearance:  Patient is 38 yo woman in no acute distress.     HEENT:  No pallor, icterus, or mucositis.    Lymph nodes:  No cervical, supraclavicular, axillary, or inguinal lymphadenopathy.   Lungs:  Clear to auscultation bilaterally.   Heart:  Regular rate and rhythm; no S3 S4 or murmer.     Abdomen:  Positive bowel sounds, soft and nontender, nondistended.  No hepatomegaly. " No splenomegaly appreciated.    Extremities:  No joint swelling or tenderness.  No ankle edema.     Skin:  No rash, no petechiae or ecchymoses.    Labs:  Results for MARY FIELDS (MRN [id number]) as of 12/13/2017 08:34   Ref. Range 9/26/2017 10:29   Ferritin Latest Ref Range: 12 - 150 ng/mL 11 (L)   Free T3 Latest Ref Range: 2.3 - 4.2 pg/mL 2.6   GGT Latest Ref Range: 0 - 40 U/L 19   Homocysteine umol/L Latest Ref Range: 4.0 - 12.0 umol/L 5.1   T3, Reverse ng/dL Latest Ref Range: 9.0 - 27.0 ng/dL 19.1   Vitamin B12 Latest Ref Range: 193 - 986 pg/mL 674   Vitamin D Deficiency screening Latest Ref Range: 20 - 75 ug/L 19 (L)   Glucose Latest Ref Range: 70 - 99 mg/dL 86   WBC Latest Ref Range: 4.0 - 11.0 10e9/L 5.9   Hemoglobin Latest Ref Range: 11.7 - 15.7 g/dL 14.1   Hematocrit Latest Ref Range: 35.0 - 47.0 % 40.6   Platelet Count Latest Ref Range: 150 - 450 10e9/L 193   RBC Count Latest Ref Range: 3.8 - 5.2 10e12/L 4.78   MCV Latest Ref Range: 78 - 100 fl 85   MCH Latest Ref Range: 26.5 - 33.0 pg 29.5   MCHC Latest Ref Range: 31.5 - 36.5 g/dL 34.7   RDW Latest Ref Range: 10.0 - 15.0 % 13.6   Diff Method Unknown Automated Method   % Neutrophils Latest Units: % 69.4   % Lymphocytes Latest Units: % 17.4   % Monocytes Latest Units: % 12.1   % Eosinophils Latest Units: % 0.9   % Basophils Latest Units: % 0.2   Absolute Neutrophil Latest Ref Range: 1.6 - 8.3 10e9/L 4.1   Absolute Lymphocytes Latest Ref Range: 0.8 - 5.3 10e9/L 1.0   Absolute Monocytes Latest Ref Range: 0.0 - 1.3 10e9/L 0.7   Absolute Eosinophils Latest Ref Range: 0.0 - 0.7 10e9/L 0.1   Absolute Basophils Latest Ref Range: 0.0 - 0.2 10e9/L 0.0     Assessment and Plan:  Iron deficiency, not yet anemic.  It is not clear to me if this is the cause of her symptoms although I have encountered many people who were not anemic but have iron deficiency who feel fatigued.  She is probably become iron deficient very slowly due to poor absorption although her celiac  disease is improved.  Also chronic menstrual blood loss with inadequate dietary intake of iron contributes.  I will go ahead and get labs today CBC with differential, ferritin, iron saturation, soluble transferrin receptor, CRP.  We will tentatively set her up for INFeD 1000 mg IV.  I will go ahead and give the InFed as long as the ferritin is less than 100.  RTC 2 months for recheck.   Chelsie Angulo MD  Hematology

## 2017-12-13 NOTE — MR AVS SNAPSHOT
After Visit Summary   12/13/2017    Kimberley Choe    MRN: [id number]           Patient Information     Date Of Birth          1980        Visit Information        Provider Department      12/13/2017 8:00 AM Chelsie Angulo MD [facility]        Today's Diagnoses     Iron deficiency anemia, unspecified iron deficiency anemia type    -  1       Follow-ups after your visit        Follow-up notes from your care team     Return m+2, for f/u Adele.      Your next 10 appointments already scheduled     Dec 13, 2017  8:45 AM CST   Lab with  LAB   [facility] Lab [facility])    [address] [place] [address]  1st Floor  [facility] [address]   [phone]            Dec 21, 2017  7:00 AM CST   Infusion 360 with  ONCOLOGY INFUSION, UC 10 ATC   [facility] ([facility])    [address] [place] [address]  2nd [facility] [id number] [phone]            Feb 14, 2018 10:00 AM CST   [facility] Lab Draw with [facility] LAB DRAW   [facility] Lab Draw ([facility])    [address] [place] [id number] [phone]            Feb 14, 2018 10:30 AM CST   (Arrive by 10:15 AM)   Return Visit with Chelsie Angulo MD   [facility] ([facility])    [address] [place] [address]  2nd [facility] [id number] [phone]              Future tests that were ordered for you today     Open Future Orders        Priority Expected Expires Ordered    CBC with platelets differential Routine 2/13/2018 6/13/2018 12/13/2017    Ferritin Routine 2/13/2018 6/13/2018 12/13/2017            Who to contact     If you have questions or need follow up information about today's clinic visit or your schedule please contact [facility] directly at [phone].  Normal or non-critical lab and imaging results will be communicated to  "you by MyChart, letter or phone within 4 business days after the clinic has received the results. If you do not hear from us within 7 days, please contact the clinic through [employer] or phone. If you have a critical or abnormal lab result, we will notify you by phone as soon as possible.  Submit refill requests through Spe[employer] With Me or call your pharmacy and they will forward the refill request to us. Please allow 3 business days for your refill to be completed.          Additional Information About Your Visit        Citybl[employer]arP[employer]on Information     [employer] With Me gives you secure access to your electronic health record. If you see a primary care provider, you can also send messages to your care team and make appointments. If you have questions, please call your primary care clinic.  If you do not have a primary care provider, please call [phone] and they will assist you.        Care EveryWhere ID     This is your Care EveryWhere ID. This could be used by other organizations to access your [place] medical records  [id number]        Your Vitals Were     Pulse Temperature Respirations Height Last Period Pulse Oximetry    82 97.6  F (36.4  C) (Tympanic) 18 1.626 m (5' 4.02\") 11/29/2017 97%    BMI (Body Mass Index)                   24.36 kg/m2            Blood Pressure from Last 3 Encounters:   12/13/17 126/81   10/18/17 124/78   08/30/17 129/80    Weight from Last 3 Encounters:   12/13/17 64.4 kg (142 lb)   10/18/17 65 kg (143 lb 3.2 oz)   06/19/17 65.8 kg (145 lb)               Primary Care Provider Fax #    Physician No Ref-Primary [phone]       No address on file        Equal Access to Services     [facility] : Hadii kevon álvarez Somanuel, waaxda luqadaha, qaybta kaalmada darío brown. So [facility] [phone].    ATENCIÓN: Si habla español, tiene a santoro disposición servicios gratuitos de asistencia lingüística. Llame al [phone].    We comply with applicable federal civil rights " laws and [place] laws. We do not discriminate on the basis of race, color, national origin, age, disability, sex, sexual orientation, or gender identity.            Thank you!     Thank you for choosing [facility]  for your care. Our goal is always to provide you with excellent care. Hearing back from our patients is one way we can continue to improve our services. Please take a few minutes to complete the written survey that you may receive in the mail after your visit with us. Thank you!             Your Updated Medication List - Protect others around you: Learn how to safely use, store and throw away your medicines at www.disposemymeds.org.          This list is accurate as of: 12/13/17  8:39 AM.  Always use your most recent med list.                   Brand Name Dispense Instructions for use Diagnosis    IRON SUPPLEMENT PO      Take 325 mg by mouth        levothyroxine 125 MCG tablet    SYNTHROID/LEVOTHROID    90 tablet    Take 1 tablet (125 mcg) by mouth daily    Postoperative hypothyroidism       SUMAtriptan 50 MG tablet    IMITREX     Take 50 mg by mouth as needed    Iron deficiency anemia, unspecified iron deficiency anemia type       VITAMIN D (CHOLECALCIFEROL) PO      Take 1,000 Units by mouth daily

## 2017-12-13 NOTE — NURSING NOTE
"Oncology Rooming Note    December 13, 2017 8:02 AM   Kimberley Choe is a 37 year old female who presents for:    Chief Complaint   Patient presents with     Oncology Clinic Visit     New patient visit related to Iron Def Anemia     Initial Vitals: /81  Pulse 82  Temp 97.6  F (36.4  C) (Tympanic)  Resp 18  Ht 1.626 m (5' 4.02\")  Wt 64.4 kg (142 lb)  LMP 11/29/2017  SpO2 97%  BMI 24.36 kg/m2 Estimated body mass index is 24.36 kg/(m^2) as calculated from the following:    Height as of this encounter: 1.626 m (5' 4.02\").    Weight as of this encounter: 64.4 kg (142 lb). Body surface area is 1.71 meters squared.  No Pain (0) Comment: Data Unavailable   Patient's last menstrual period was 11/29/2017.  Allergies reviewed: Yes  Medications reviewed: Yes    Medications: Medication refills not needed today.  Pharmacy name entered into EPIC: [facility] [place], [place] - [address] [place] [address]    Clinical concerns: No new concerns. Provider was notified.    10 minutes for nursing intake (face to face time)     Katerin Diaz LPN            "

## 2017-12-13 NOTE — PROGRESS NOTES
PROBLEM LIST:    1. H/o iron deficiency anemia.  2. Gluten enteropathy (celiac disease), confirmed by EGD and bix on 11/1/10  3. , at approximately 34 weeks.  4. Status post near total thyroidectomy 10/15/2002 for multinodular goiter. Left residual tissue  5. Status post arthroscopic hip surgery .     INTERIM HISTORY:  The patient is here for follow-up of iron deficiency.  I last saw her in .  She has received INFeD on one occasion on 2010 for iron deficiency anemia.  She has celiac disease which he says is under good control.  Occasional abdominal pain, no diarrhea.  She follows strict gluten-free diet.  Lately she has been having headaches has been diagnosed with migraines although she says she does not know if Imitrex helps because she just vomited up.  She feels like her ears are ringing with a rushing sound.  She has fatigue but no pica.  Her hair is not thinning.  Menstrual periods are very regular and last 4-5 days never has any clotting or need to use pads or tampons every hour.  She has some ongoing body aches for which there is never really been made a diagnosis.  She had a sinus infection in the spring and says she feels like she never quite recovered from it.  She also had Lyme disease in the fall with a classic bull's-eye lesion and was treated with 2 weeks of doxycycline.    She has bilateral carpal tunnel syndrome, helped by exercises.     Answers for HPI/ROS submitted by the patient on 2017   General Symptoms: Yes  Skin Symptoms: No  HENT Symptoms: Yes  EYE SYMPTOMS: No  HEART SYMPTOMS: No  LUNG SYMPTOMS: No  INTESTINAL SYMPTOMS: No  URINARY SYMPTOMS: No  GYNECOLOGIC SYMPTOMS: No  BREAST SYMPTOMS: No  SKELETAL SYMPTOMS: Yes  BLOOD SYMPTOMS: Yes  NERVOUS SYSTEM SYMPTOMS: Yes  MENTAL HEALTH SYMPTOMS: No  Fever: No  Loss of appetite: No  Weight loss: No  Weight gain: No  Fatigue: Yes  Night sweats: No  Chills: No  Increased stress: No  Excessive hunger: No  Excessive thirst:  "No  Feeling hot or cold when others believe the temperature is normal: No  Loss of height: No  Post-operative complications: No  Surgical site pain: No  Hallucinations: No  Change in or Loss of Energy: Yes  Hyperactivity: No  Confusion: No  Ear pain: Yes  Ear discharge: No  Hearing loss: No  Tinnitus: Yes  Nosebleeds: No  Congestion: No  Sinus pain: No  Trouble swallowing: No   Voice hoarseness: No  Mouth sores: No  Sore throat: No  Tooth pain: No  Gum tenderness: No  Bleeding gums: No  Change in taste: No  Change in sense of smell: No  Dry mouth: No  Hearing aid used: No  Neck lump: No  Back pain: No  Muscle aches: Yes  Neck pain: No  Swollen joints: No  Joint pain: Yes  Bone pain: No  Muscle cramps: No  Muscle weakness: No  Joint stiffness: Yes  Bone fracture: No  Anemia: Yes  Swollen glands: No  Easy bleeding or bruising: No  Edema or swelling: No  Trouble with coordination: No  Dizziness or trouble with balance: No  Fainting or black-out spells: No  Memory loss: No  Headache: Yes  Seizures: No  Speech problems: No  Tingling: No  Tremor: No  Weakness: No  Difficulty walking: No  Paralysis: No  Numbness: No      PHYSICAL EXAMINATION:  /81  Pulse 82  Temp 97.6  F (36.4  C) (Tympanic)  Resp 18  Ht 1.626 m (5' 4.02\")  Wt 64.4 kg (142 lb)  LMP 11/29/2017  SpO2 97%  BMI 24.36 kg/m2    General appearance:  Patient is 38 yo woman in no acute distress.     HEENT:  No pallor, icterus, or mucositis.    Lymph nodes:  No cervical, supraclavicular, axillary, or inguinal lymphadenopathy.   Lungs:  Clear to auscultation bilaterally.   Heart:  Regular rate and rhythm; no S3 S4 or murmer.     Abdomen:  Positive bowel sounds, soft and nontender, nondistended.  No hepatomegaly. No splenomegaly appreciated.    Extremities:  No joint swelling or tenderness.  No ankle edema.     Skin:  No rash, no petechiae or ecchymoses.    Labs:  Results for MARY FIELDS (MRN [id number]) as of 12/13/2017 08:34   Ref. Range 9/26/2017 " 10:29   Ferritin Latest Ref Range: 12 - 150 ng/mL 11 (L)   Free T3 Latest Ref Range: 2.3 - 4.2 pg/mL 2.6   GGT Latest Ref Range: 0 - 40 U/L 19   Homocysteine umol/L Latest Ref Range: 4.0 - 12.0 umol/L 5.1   T3, Reverse ng/dL Latest Ref Range: 9.0 - 27.0 ng/dL 19.1   Vitamin B12 Latest Ref Range: 193 - 986 pg/mL 674   Vitamin D Deficiency screening Latest Ref Range: 20 - 75 ug/L 19 (L)   Glucose Latest Ref Range: 70 - 99 mg/dL 86   WBC Latest Ref Range: 4.0 - 11.0 10e9/L 5.9   Hemoglobin Latest Ref Range: 11.7 - 15.7 g/dL 14.1   Hematocrit Latest Ref Range: 35.0 - 47.0 % 40.6   Platelet Count Latest Ref Range: 150 - 450 10e9/L 193   RBC Count Latest Ref Range: 3.8 - 5.2 10e12/L 4.78   MCV Latest Ref Range: 78 - 100 fl 85   MCH Latest Ref Range: 26.5 - 33.0 pg 29.5   MCHC Latest Ref Range: 31.5 - 36.5 g/dL 34.7   RDW Latest Ref Range: 10.0 - 15.0 % 13.6   Diff Method Unknown Automated Method   % Neutrophils Latest Units: % 69.4   % Lymphocytes Latest Units: % 17.4   % Monocytes Latest Units: % 12.1   % Eosinophils Latest Units: % 0.9   % Basophils Latest Units: % 0.2   Absolute Neutrophil Latest Ref Range: 1.6 - 8.3 10e9/L 4.1   Absolute Lymphocytes Latest Ref Range: 0.8 - 5.3 10e9/L 1.0   Absolute Monocytes Latest Ref Range: 0.0 - 1.3 10e9/L 0.7   Absolute Eosinophils Latest Ref Range: 0.0 - 0.7 10e9/L 0.1   Absolute Basophils Latest Ref Range: 0.0 - 0.2 10e9/L 0.0     Assessment and Plan:  Iron deficiency, not yet anemic.  It is not clear to me if this is the cause of her symptoms although I have encountered many people who were not anemic but have iron deficiency who feel fatigued.  She is probably become iron deficient very slowly due to poor absorption although her celiac disease is improved.  Also chronic menstrual blood loss with inadequate dietary intake of iron contributes.  I will go ahead and get labs today CBC with differential, ferritin, iron saturation, soluble transferrin receptor, CRP.  We will  tentatively set her up for INFeD 1000 mg IV.  I will go ahead and give the InFed as long as the ferritin is less than 100.  RTC 2 months for recheck.   Chelsie Angulo MD  Hematology

## 2017-12-14 LAB — STFR SERPL-SCNC: 3 MG/L (ref 1.9–4.4)

## 2017-12-21 ENCOUNTER — INFUSION THERAPY VISIT (OUTPATIENT)
Dept: ONCOLOGY | Facility: CLINIC | Age: 37
End: 2017-12-21
Attending: INTERNAL MEDICINE
Payer: COMMERCIAL

## 2017-12-21 VITALS
OXYGEN SATURATION: 99 % | DIASTOLIC BLOOD PRESSURE: 67 MMHG | TEMPERATURE: 98.2 F | SYSTOLIC BLOOD PRESSURE: 110 MMHG | HEART RATE: 82 BPM | RESPIRATION RATE: 18 BRPM

## 2017-12-21 DIAGNOSIS — D50.9 IRON DEFICIENCY ANEMIA, UNSPECIFIED IRON DEFICIENCY ANEMIA TYPE: Primary | ICD-10-CM

## 2017-12-21 PROCEDURE — 96366 THER/PROPH/DIAG IV INF ADDON: CPT

## 2017-12-21 PROCEDURE — 25000128 H RX IP 250 OP 636: Mod: ZF | Performed by: INTERNAL MEDICINE

## 2017-12-21 PROCEDURE — 96365 THER/PROPH/DIAG IV INF INIT: CPT

## 2017-12-21 PROCEDURE — 25000132 ZZH RX MED GY IP 250 OP 250 PS 637: Mod: ZF | Performed by: INTERNAL MEDICINE

## 2017-12-21 RX ORDER — DIPHENHYDRAMINE HCL 25 MG
25 CAPSULE ORAL ONCE
Status: COMPLETED | OUTPATIENT
Start: 2017-12-21 | End: 2017-12-21

## 2017-12-21 RX ORDER — EPINEPHRINE 0.3 MG/.3ML
INJECTION SUBCUTANEOUS
Status: DISCONTINUED
Start: 2017-12-21 | End: 2017-12-21 | Stop reason: WASHOUT

## 2017-12-21 RX ADMIN — IRON DEXTRAN 975 MG: 50 INJECTION INTRAMUSCULAR; INTRAVENOUS at 09:11

## 2017-12-21 RX ADMIN — DIPHENHYDRAMINE HYDROCHLORIDE 25 MG: 25 CAPSULE ORAL at 07:43

## 2017-12-21 RX ADMIN — SODIUM CHLORIDE 250 ML: 9 INJECTION, SOLUTION INTRAVENOUS at 07:19

## 2017-12-21 RX ADMIN — IRON DEXTRAN 25 MG: 50 INJECTION INTRAMUSCULAR; INTRAVENOUS at 07:58

## 2017-12-21 ASSESSMENT — PAIN SCALES - GENERAL: PAINLEVEL: NO PAIN (0)

## 2017-12-21 NOTE — PROGRESS NOTES
Infusion Nursing Note:  Kimberley Choe presents today for Infed.    Patient seen by provider today: No    Treatment Conditions:  Lab Results   Component Value Date    HGB 15.2 12/13/2017     Lab Results   Component Value Date    WBC 5.3 12/13/2017      Lab Results   Component Value Date    ANEU 3.5 12/13/2017     Lab Results   Component Value Date     12/13/2017      Ferritin level on 12/13/17: 11        Intravenous Access:  Peripheral IV placed.    Note: Pt reported itching with previous infed infusion in 2010. Dr. Angulo notified.  12/21/17 TORB Dr. Angulo / Danisha Mckeon RN:  Give Benadryl 25mg PO x 1 pre INFED.    Tolerated infusion today without incident.       Post Infusion Assessment:  Patient tolerated infusion without incident.  Blood return noted pre and post infusion.  Access discontinued per protocol.    Discharge Plan:   Patient declined prescription refills.  Discharge instructions reviewed with: Patient.  Patient and/or family verbalized understanding of discharge instructions and all questions answered.  Copy of AVS reviewed with patient and/or family.  Patient will return 2/14/18 for next appointment.  Patient discharged in stable condition accompanied by: .  Departure Mode: Ambulatory.  Face to Face time: 2 min.    Danisha Mckeon, RN

## 2017-12-21 NOTE — PATIENT INSTRUCTIONS
Contact Numbers    [facility] [place] [phone]  [facility] [place]:  [phone]    Call triage with chills and/or temperature greater than or equal to 100.5, uncontrolled nausea/vomiting, diarrhea, constipation, dizziness, shortness of breath, chest pain, bleeding, unexplained bruising, or any new/concerning symptoms, questions/concerns.     If you are having any concerning symptoms or wish to speak to a provider before your next infusion visit, please call your care coordinator or triage to notify them so we can adequately serve you.       After Hours: [phone]    If after hours, weekends, or holidays, call main hospital  and ask for Oncology doctor on call.           December 2017 Sunday Monday Tuesday Wednesday Thursday Friday Saturday                            1     2       3     4     5     6     7     8     9       10     11     12     13     [facility] NEW    7:45 AM   (60 min.)   Chelsie Angulo MD   [facility]     LAB WITH  CLINIC    8:45 AM   (15 min.)    LAB   [facility] Lab 14     15     16       17     18     19     20     21     [facility] ONC INFUSION 360    7:00 AM   (360 min.)    ONCOLOGY INFUSION   [facility] 22     23       24     25     26     27     28     29     30       31                                              January 2018 Sunday Monday Tuesday Wednesday Thursday Friday Saturday        1     2     3     4     5     6       7     8     9     10     11     12     13       14     15     16     17     18     19     20       21     22     23     24     25     26     27       28     29     30     31                              No results found for this or any previous visit (from the past 24 hour(s)).

## 2017-12-21 NOTE — MR AVS SNAPSHOT
After Visit Summary   12/21/2017    Kimberley Choe    MRN: [id number]           Patient Information     Date Of Birth          1980        Visit Information        Provider Department      12/21/2017 7:00 AM UC 10 ATC;  ONCOLOGY INFUSION [facility]        Today's Diagnoses     Iron deficiency anemia, unspecified iron deficiency anemia type    -  1      Care Instructions    Contact Numbers    [facility] [place]: [phone]  [facility] [place]:  [phone]    Call triage with chills and/or temperature greater than or equal to 100.5, uncontrolled nausea/vomiting, diarrhea, constipation, dizziness, shortness of breath, chest pain, bleeding, unexplained bruising, or any new/concerning symptoms, questions/concerns.     If you are having any concerning symptoms or wish to speak to a provider before your next infusion visit, please call your care coordinator or triage to notify them so we can adequately serve you.       After Hours: [phone]    If after hours, weekends, or holidays, call main hospital  and ask for Oncology doctor on call.           December 2017 Sunday Monday Tuesday Wednesday Thursday Friday Saturday                            1     2       3     4     5     6     7     8     9       10     11     12     13     [facility] NEW    7:45 AM   (60 min.)   Chelsie Angulo MD   [facility]     LAB WITH  CLINIC    8:45 AM   (15 min.)    LAB   [facility] Lab 14     15     16       17     18     19     20     21     [facility] ONC INFUSION 360    7:00 AM   (360 min.)    ONCOLOGY INFUSION   [facility] 22     23       24     25     26     27     28     29     30       31 January 2018 Sunday Monday Tuesday Wednesday Thursday Friday Saturday        1     2     3     4     5     6       7     8     9     10     11     12     13       14     15     16     17     18     19     20       21      22     23     24     25     26     27       28     29     30     31                              No results found for this or any previous visit (from the past 24 hour(s)).            Follow-ups after your visit        Your next 10 appointments already scheduled     Feb 14, 2018 10:00 AM CST   [facility] Lab Draw with  [employer] LAB DRAW   [facility] Lab Draw ([facility])    [address] [place] [id number] [phone]            Feb 14, 2018 10:30 AM CST   (Arrive by 10:15 AM)   Return Visit with Chelsie Angulo MD   [facility] ([facility])    [address] [place] [address] [phone]              Who to contact     If you have questions or need follow up information about today's clinic visit or your schedule please contact [facility] directly at [phone].  Normal or non-critical lab and imaging results will be communicated to you by SproutBoxhart, letter or phone within 4 business days after the clinic has received the results. If you do not hear from us within 7 days, please contact the clinic through [employer] or phone. If you have a critical or abnormal lab result, we will notify you by phone as soon as possible.  Submit refill requests through [employer] or call your pharmacy and they will forward the refill request to us. Please allow 3 business days for your refill to be completed.          Additional Information About Your Visit        SproutBoxhart Information     [employer] gives you secure access to your electronic health record. If you see a primary care provider, you can also send messages to your care team and make appointments. If you have questions, please call your primary care clinic.  If you do not have a primary care provider, please call [phone] and they will assist you.        Care EveryWhere ID     This is your Care EveryWhere ID. This could be  used by other organizations to access your [place] medical records  [id number]        Your Vitals Were     Pulse Temperature Respirations Last Period Pulse Oximetry       75 98.2  F (36.8  C) (Oral) 18 11/29/2017 99%        Blood Pressure from Last 3 Encounters:   12/21/17 104/71   12/13/17 126/81   10/18/17 124/78    Weight from Last 3 Encounters:   12/13/17 64.4 kg (142 lb)   10/18/17 65 kg (143 lb 3.2 oz)   06/19/17 65.8 kg (145 lb)              Today, you had the following     No orders found for display       Primary Care Provider Fax #    Physician No Ref-Primary [phone]       No address on file        Equal Access to Services     [place] SHULTZ : Geeta Louis, wagee hopson, qanathan kaalmaclaudia brown, darío coleman . So [facility] [phone].    ATENCIÓN: Si habla español, tiene a santoro disposición servicios gratuitos de asistencia lingüística. Llame al [phone].    We comply with applicable federal civil rights laws and [place] laws. We do not discriminate on the basis of race, color, national origin, age, disability, sex, sexual orientation, or gender identity.            Thank you!     Thank you for choosing [facility]  for your care. Our goal is always to provide you with excellent care. Hearing back from our patients is one way we can continue to improve our services. Please take a few minutes to complete the written survey that you may receive in the mail after your visit with us. Thank you!             Your Updated Medication List - Protect others around you: Learn how to safely use, store and throw away your medicines at www.disposemymeds.org.          This list is accurate as of: 12/21/17  9:25 AM.  Always use your most recent med list.                   Brand Name Dispense Instructions for use Diagnosis    IRON SUPPLEMENT PO      Take 325 mg by mouth        levothyroxine 125 MCG tablet    SYNTHROID/LEVOTHROID    90 tablet    Take  1 tablet (125 mcg) by mouth daily    Postoperative hypothyroidism       SUMAtriptan 50 MG tablet    IMITREX     Take 50 mg by mouth as needed    Iron deficiency anemia, unspecified iron deficiency anemia type       VITAMIN D (CHOLECALCIFEROL) PO      Take 1,000 Units by mouth daily

## 2018-02-14 ENCOUNTER — ONCOLOGY VISIT (OUTPATIENT)
Dept: ONCOLOGY | Facility: CLINIC | Age: 38
End: 2018-02-14
Attending: INTERNAL MEDICINE
Payer: COMMERCIAL

## 2018-02-14 ENCOUNTER — APPOINTMENT (OUTPATIENT)
Dept: LAB | Facility: CLINIC | Age: 38
End: 2018-02-14
Attending: INTERNAL MEDICINE
Payer: COMMERCIAL

## 2018-02-14 VITALS
SYSTOLIC BLOOD PRESSURE: 116 MMHG | DIASTOLIC BLOOD PRESSURE: 76 MMHG | OXYGEN SATURATION: 97 % | TEMPERATURE: 98.8 F | HEIGHT: 64 IN | HEART RATE: 69 BPM | WEIGHT: 136.6 LBS | BODY MASS INDEX: 23.32 KG/M2

## 2018-02-14 DIAGNOSIS — D50.9 IRON DEFICIENCY ANEMIA, UNSPECIFIED IRON DEFICIENCY ANEMIA TYPE: ICD-10-CM

## 2018-02-14 LAB
BASOPHILS # BLD AUTO: 0 10E9/L (ref 0–0.2)
BASOPHILS NFR BLD AUTO: 0.7 %
DIFFERENTIAL METHOD BLD: NORMAL
EOSINOPHIL # BLD AUTO: 0.1 10E9/L (ref 0–0.7)
EOSINOPHIL NFR BLD AUTO: 1.1 %
ERYTHROCYTE [DISTWIDTH] IN BLOOD BY AUTOMATED COUNT: 12.3 % (ref 10–15)
FERRITIN SERPL-MCNC: 194 NG/ML (ref 12–150)
HCT VFR BLD AUTO: 43.8 % (ref 35–47)
HGB BLD-MCNC: 15.3 G/DL (ref 11.7–15.7)
IMM GRANULOCYTES # BLD: 0 10E9/L (ref 0–0.4)
IMM GRANULOCYTES NFR BLD: 0.2 %
LYMPHOCYTES # BLD AUTO: 1.1 10E9/L (ref 0.8–5.3)
LYMPHOCYTES NFR BLD AUTO: 24.3 %
MCH RBC QN AUTO: 29.7 PG (ref 26.5–33)
MCHC RBC AUTO-ENTMCNC: 34.9 G/DL (ref 31.5–36.5)
MCV RBC AUTO: 85 FL (ref 78–100)
MONOCYTES # BLD AUTO: 0.5 10E9/L (ref 0–1.3)
MONOCYTES NFR BLD AUTO: 11.2 %
NEUTROPHILS # BLD AUTO: 2.8 10E9/L (ref 1.6–8.3)
NEUTROPHILS NFR BLD AUTO: 62.5 %
NRBC # BLD AUTO: 0 10*3/UL
NRBC BLD AUTO-RTO: 0 /100
PLATELET # BLD AUTO: 201 10E9/L (ref 150–450)
RBC # BLD AUTO: 5.16 10E12/L (ref 3.8–5.2)
WBC # BLD AUTO: 4.5 10E9/L (ref 4–11)

## 2018-02-14 PROCEDURE — 99214 OFFICE O/P EST MOD 30 MIN: CPT | Mod: ZP | Performed by: INTERNAL MEDICINE

## 2018-02-14 PROCEDURE — 82728 ASSAY OF FERRITIN: CPT | Performed by: INTERNAL MEDICINE

## 2018-02-14 PROCEDURE — 36415 COLL VENOUS BLD VENIPUNCTURE: CPT

## 2018-02-14 PROCEDURE — 85025 COMPLETE CBC W/AUTO DIFF WBC: CPT | Performed by: INTERNAL MEDICINE

## 2018-02-14 PROCEDURE — G0463 HOSPITAL OUTPT CLINIC VISIT: HCPCS | Mod: ZF

## 2018-02-14 ASSESSMENT — PAIN SCALES - GENERAL: PAINLEVEL: NO PAIN (0)

## 2018-02-14 NOTE — MR AVS SNAPSHOT
After Visit Summary   2/14/2018    Kimberley Choe    MRN: [id number]           Patient Information     Date Of Birth          1980        Visit Information        Provider Department      2/14/2018 10:30 AM Chelsie Angulo MD [facility]        Today's Diagnoses     Iron deficiency anemia, unspecified iron deficiency anemia type           Follow-ups after your visit        Follow-up notes from your care team     Return in about 1 year (around 2/14/2019) for f/u Adele.      Your next 10 appointments already scheduled     Aug 15, 2018 12:15 PM CDT   [facility] Lab Draw with  [facility] DRAW   [facility] Draw ([facility])    [address] [place] [address]  [address]  [facility] [id number] [phone]            Feb 13, 2019  9:30 AM CST   [facility] with  [facility] DRAW   [facility] ([facility])    [address] [place] [address]  [address]  [facility] [id number] [phone]            Feb 13, 2019 10:00 AM CST   (Arrive by 9:45 AM)   Return Visit with Chelsie Angulo MD   [facility] ([facility])    [address] [place] [address]  [address]  [facility] [id number] [phone]              Who to contact     If you have questions or need follow up information about today's clinic visit or your schedule please contact [facility] directly at [phone].  Normal or non-critical lab and imaging results will be communicated to you by MyChart, letter or phone within 4 business days after the clinic has received the results. If you do not hear from us within 7 days, please contact the clinic through [employer] or phone. If you have a critical or abnormal lab result, we will notify you by phone as soon as possible.  Submit refill requests through [employer] or call your pharmacy and they will forward the refill request to us. Please allow 3 business  "days for your refill to be completed.          Additional Information About Your Visit        MyChart Information     [employer] gives you secure access to your electronic health record. If you see a primary care provider, you can also send messages to your care team and make appointments. If you have questions, please call your primary care clinic.  If you do not have a primary care provider, please call [phone] and they will assist you.        Care EveryWhere ID     This is your Care EveryWhere ID. This could be used by other organizations to access your [place] medical records  [id number]        Your Vitals Were     Pulse Temperature Height Pulse Oximetry BMI (Body Mass Index)       69 98.8  F (37.1  C) (Oral) 1.626 m (5' 4.02\") 97% 23.44 kg/m2        Blood Pressure from Last 3 Encounters:   02/14/18 116/76   12/21/17 110/67   12/13/17 126/81    Weight from Last 3 Encounters:   02/14/18 62 kg (136 lb 9.6 oz)   12/13/17 64.4 kg (142 lb)   10/18/17 65 kg (143 lb 3.2 oz)              We Performed the Following     CBC with platelets differential     Ferritin        Primary Care Provider Fax #    Physician No Ref-Primary [phone]       No address on file        Equal Access to Services     [place] SHULTZ : Hadii kevon reado Somanuel, waaxda luqadaha, qaybta kaalmada adeegyada, darío coleman . So [facility] [phone].    ATENCIÓN: Si habla español, tiene a santoro disposición servicios gratuitos de asistencia lingüística. Llame al [phone].    We comply with applicable federal civil rights laws and [place] laws. We do not discriminate on the basis of race, color, national origin, age, disability, sex, sexual orientation, or gender identity.            Thank you!     Thank you for choosing [facility]  for your care. Our goal is always to provide you with excellent care. Hearing back from our patients is one way we can continue to improve our services. Please take a " few minutes to complete the written survey that you may receive in the mail after your visit with us. Thank you!             Your Updated Medication List - Protect others around you: Learn how to safely use, store and throw away your medicines at www.disposemymeds.org.          This list is accurate as of 2/14/18 11:59 PM.  Always use your most recent med list.                   Brand Name Dispense Instructions for use Diagnosis    IRON SUPPLEMENT PO      Take 325 mg by mouth        levothyroxine 125 MCG tablet    SYNTHROID/LEVOTHROID    90 tablet    Take 1 tablet (125 mcg) by mouth daily    Postoperative hypothyroidism       SUMAtriptan 50 MG tablet    IMITREX     Take 50 mg by mouth as needed    Iron deficiency anemia, unspecified iron deficiency anemia type       VITAMIN D (CHOLECALCIFEROL) PO      Take 1,000 Units by mouth daily

## 2018-02-14 NOTE — PROGRESS NOTES
"PROBLEM LIST:    1. H/o iron deficiency anemia.  2. Gluten enteropathy (celiac disease), confirmed by EGD and bx on 11/1/10  3. , at approximately 34 weeks.  4. Status post near total thyroidectomy 10/15/2002 for multinodular goiter. Left residual tissue  5. Status post arthroscopic hip surgery .      INTERIM HISTORY:  The patient is here for follow-up of iron deficiency. Received InFed on 18. She is feeling much better.    PHYSICAL EXAMINATION:  /76 (BP Location: Left arm, Patient Position: Chair, Cuff Size: Adult Regular)  Pulse 69  Temp 98.8  F (37.1  C) (Oral)  Ht 1.626 m (5' 4.02\")  Wt 62 kg (136 lb 9.6 oz)  SpO2 97%  BMI 23.44 kg/m2    General appearance:  Patient is 36 yo woman  in no acute distress.     HEENT:  No pallor, icterus, or mucositis.  Faint scar.   Lungs:  Clear to auscultation bilaterally.   Heart:  Regular rate and rhythm; no S3 S4 or murmer.     Abdomen:  Positive bowel sounds, soft and nontender, nondistended.  No hepatomegaly. No splenomegaly appreciated.    Extremities:  No joint swelling or tenderness.  No ankle edema.     Skin:  No rash, no petechiae or ecchymoses.    Labs:  Results for MARY FIELDS (MRN [id number]) as of 2018 18:03   Ref. Range 2018 10:01   Ferritin Latest Ref Range: 12 - 150 ng/mL 194 (H)   WBC Latest Ref Range: 4.0 - 11.0 10e9/L 4.5   Hemoglobin Latest Ref Range: 11.7 - 15.7 g/dL 15.3   Hematocrit Latest Ref Range: 35.0 - 47.0 % 43.8   Platelet Count Latest Ref Range: 150 - 450 10e9/L 201   RBC Count Latest Ref Range: 3.8 - 5.2 10e12/L 5.16   MCV Latest Ref Range: 78 - 100 fl 85   MCH Latest Ref Range: 26.5 - 33.0 pg 29.7   MCHC Latest Ref Range: 31.5 - 36.5 g/dL 34.9   RDW Latest Ref Range: 10.0 - 15.0 % 12.3   Diff Method Unknown Automated Method   % Neutrophils Latest Units: % 62.5   % Lymphocytes Latest Units: % 24.3   % Monocytes Latest Units: % 11.2   % Eosinophils Latest Units: % 1.1   % Basophils Latest Units: % 0.7   % " Immature Granulocytes Latest Units: % 0.2   Nucleated RBCs Latest Ref Range: 0 /100 0   Absolute Neutrophil Latest Ref Range: 1.6 - 8.3 10e9/L 2.8   Absolute Lymphocytes Latest Ref Range: 0.8 - 5.3 10e9/L 1.1   Absolute Monocytes Latest Ref Range: 0.0 - 1.3 10e9/L 0.5   Absolute Eosinophils Latest Ref Range: 0.0 - 0.7 10e9/L 0.1   Absolute Basophils Latest Ref Range: 0.0 - 0.2 10e9/L 0.0   Abs Immature Granulocytes Latest Ref Range: 0 - 0.4 10e9/L 0.0   Absolute Nucleated RBC Unknown 0.0       Assessment and Plan:  Iron deficiency related to poor absorption due to celiac disease. Now replete after receiving additional IV iron.   Should have recheck of CBC and iron in 6 and 12 months. See me in 12 months.     Chelsie Angulo MD  Hematology

## 2018-02-14 NOTE — LETTER
"2018       RE: Kimberley Fields  [address] [facility] [address]     Dear Colleague,    Thank you for referring your patient, Kimberley Fields, to the [facility]. Please see a copy of my visit note below.    PROBLEM LIST:    1. H/o iron deficiency anemia.  2. Gluten enteropathy (celiac disease), confirmed by EGD and bx on 11/1/10  3. , at approximately 34 weeks.  4. Status post near total thyroidectomy 10/15/2002 for multinodular goiter. Left residual tissue  5. Status post arthroscopic hip surgery .      INTERIM HISTORY:  The patient is here for follow-up of iron deficiency. Received InFed on 18. She is feeling much better.    PHYSICAL EXAMINATION:  /76 (BP Location: Left arm, Patient Position: Chair, Cuff Size: Adult Regular)  Pulse 69  Temp 98.8  F (37.1  C) (Oral)  Ht 1.626 m (5' 4.02\")  Wt 62 kg (136 lb 9.6 oz)  SpO2 97%  BMI 23.44 kg/m2    General appearance:  Patient is 36 yo woman  in no acute distress.     HEENT:  No pallor, icterus, or mucositis.  Faint scar.   Lungs:  Clear to auscultation bilaterally.   Heart:  Regular rate and rhythm; no S3 S4 or murmer.     Abdomen:  Positive bowel sounds, soft and nontender, nondistended.  No hepatomegaly. No splenomegaly appreciated.    Extremities:  No joint swelling or tenderness.  No ankle edema.     Skin:  No rash, no petechiae or ecchymoses.    Labs:  Results for KIMBERLEY FIELDS (MRN [id number]) as of 2018 18:03   Ref. Range 2018 10:01   Ferritin Latest Ref Range: 12 - 150 ng/mL 194 (H)   WBC Latest Ref Range: 4.0 - 11.0 10e9/L 4.5   Hemoglobin Latest Ref Range: 11.7 - 15.7 g/dL 15.3   Hematocrit Latest Ref Range: 35.0 - 47.0 % 43.8   Platelet Count Latest Ref Range: 150 - 450 10e9/L 201   RBC Count Latest Ref Range: 3.8 - 5.2 10e12/L 5.16   MCV Latest Ref Range: 78 - 100 fl 85   MCH Latest Ref Range: 26.5 - 33.0 pg 29.7   MCHC Latest Ref Range: 31.5 - 36.5 g/dL 34.9   RDW Latest Ref Range: " 10.0 - 15.0 % 12.3   Diff Method Unknown Automated Method   % Neutrophils Latest Units: % 62.5   % Lymphocytes Latest Units: % 24.3   % Monocytes Latest Units: % 11.2   % Eosinophils Latest Units: % 1.1   % Basophils Latest Units: % 0.7   % Immature Granulocytes Latest Units: % 0.2   Nucleated RBCs Latest Ref Range: 0 /100 0   Absolute Neutrophil Latest Ref Range: 1.6 - 8.3 10e9/L 2.8   Absolute Lymphocytes Latest Ref Range: 0.8 - 5.3 10e9/L 1.1   Absolute Monocytes Latest Ref Range: 0.0 - 1.3 10e9/L 0.5   Absolute Eosinophils Latest Ref Range: 0.0 - 0.7 10e9/L 0.1   Absolute Basophils Latest Ref Range: 0.0 - 0.2 10e9/L 0.0   Abs Immature Granulocytes Latest Ref Range: 0 - 0.4 10e9/L 0.0   Absolute Nucleated RBC Unknown 0.0       Assessment and Plan:  Iron deficiency related to poor absorption due to celiac disease. Now replete after receiving additional IV iron.   Should have recheck of CBC and iron in 6 and 12 months. See me in 12 months.     Chelsie Angulo MD  Hematology

## 2018-02-14 NOTE — NURSING NOTE
Chief Complaint   Patient presents with     Blood Draw     labs drawn from left arm via vienpuncture and vitals taken by GEE Duncan CMA February 14, 2018

## 2018-02-14 NOTE — NURSING NOTE
"Oncology Rooming Note    February 14, 2018 10:26 AM   Kimberley Choe is a 37 year old female who presents for:    Chief Complaint   Patient presents with     Blood Draw     labs drawn from left arm via vienpuncture and vitals taken by [facility]     Oncology Clinic Visit     2 month f/u Iron def Anemia     Initial Vitals: /76 (BP Location: Left arm, Patient Position: Chair, Cuff Size: Adult Regular)  Pulse 69  Temp 98.8  F (37.1  C) (Oral)  Ht 1.626 m (5' 4.02\")  Wt 62 kg (136 lb 9.6 oz)  SpO2 97%  BMI 23.44 kg/m2 Estimated body mass index is 23.44 kg/(m^2) as calculated from the following:    Height as of this encounter: 1.626 m (5' 4.02\").    Weight as of this encounter: 62 kg (136 lb 9.6 oz). Body surface area is 1.67 meters squared.  No Pain (0) Comment: Data Unavailable   No LMP recorded.  Allergies reviewed: Yes  Medications reviewed: Yes    Medications: Medication refills not needed today.  Pharmacy name entered into EPIC: [facility] - [place], [place] - [address] [place] [address]    Clinical concerns: none DR Angulo was NOT notified.    10 minutes for nursing intake (face to face time)     LINSEY THOMPSON LPN            "

## 2018-04-24 DIAGNOSIS — E89.0 POSTOPERATIVE HYPOTHYROIDISM: ICD-10-CM

## 2018-04-24 RX ORDER — LEVOTHYROXINE SODIUM 125 UG/1
TABLET ORAL
Qty: 30 TABLET | Refills: 0 | Status: SHIPPED | OUTPATIENT
Start: 2018-04-24 | End: 2018-06-05

## 2018-04-24 NOTE — TELEPHONE ENCOUNTER
"Requested Prescriptions   Pending Prescriptions Disp Refills     levothyroxine (SYNTHROID/LEVOTHROID) 125 MCG tablet [Pharmacy Med Name: LEVOTHYROXIN 125MCG TAB]  Last Written Prescription Date:  04/10/2017  Last Fill Quantity: 90,  # refills: 3   Last office visit: 10/18/2017 with prescribing provider:  Roberth   Future Office Visit:     90 tablet 3     Sig: TAKE ONE TABLET BY MOUTH ONCE DAILY    Thyroid Protocol Passed    4/24/2018  9:26 AM       Passed - Patient is 12 years or older       Passed - Recent (12 mo) or future (30 days) visit within the authorizing provider's specialty    Patient had office visit in the last 12 months or has a visit in the next 30 days with authorizing provider or within the authorizing provider's specialty.  See \"Patient Info\" tab in inbasket, or \"Choose Columns\" in Meds & Orders section of the refill encounter.           Passed - Normal TSH on file in past 12 months    Recent Labs   Lab Test  05/18/17   1328   TSH  1.14             Passed - No active pregnancy on record    If patient is pregnant or has had a positive pregnancy test, please check TSH.         Passed - No positive pregnancy test in past 12 months    If patient is pregnant or has had a positive pregnancy test, please check TSH.          Dipak Arellano RT (R)    "

## 2018-04-24 NOTE — PROGRESS NOTES
SUBJECTIVE:   Kimberley Choe is a 37 year old female who presents to clinic today for the following health issues:      PELVIC PAIN     Onset: 7 months ago    Description:   Character: Sharp and Stabbing  Location: right lower quadrant  Radiation: Back    Intensity: 10/10    Progression of Symptoms:  intermittent    Accompanying Signs & Symptoms:  Fever/Chills?: no   Gas/Bloating: no   Nausea: YES  Vomitting: no   Diarrhea?: no   Constipation:no   Dysuria or Hematuria: no    History:   Trauma: no   Previous similar pain: YES   Previous tests done: none    Precipitating factors:   Does the pain change with:     Food: no      BM: no     Urination: no     Alleviating factors:  none    Therapies Tried and outcome: ibuprofen- no relief    LMP:  2018 Approximate     For the last 7 months, having mostly right sided, but occasionally left sided lower pelvic pain, can radiate into her back. When present, lasts approximately 30 minutes and spontaneously resolves. Occurs approximately weekly. Describes as sharp and shooting. No aggravating factors, helps to lay down. Occasional associated nausea. Denies abnormal vaginal symptoms, urinary symptoms or bowel changes.  No history of ovarian cysts, has had  x 3.  Has noticed some increase in cramping when menses do occur as well, cycles very regular. No pain with intercourse. Using partner with vasectomy for contraception. Denies dizziness, fatigue.     Problem list and histories reviewed & adjusted, as indicated.  Additional history: as documented    Patient Active Problem List   Diagnosis     Hypothyroidism     Undiagnosed cardiac murmurs     Anemia     Hip pain     CARDIOVASCULAR SCREENING; LDL GOAL LESS THAN 160     Celiac disease     Joint pains     Fatty liver disease, nonalcoholic     Thyrotoxicosis     Family history of congenital heart disease     Vitamin D deficiency     Iron deficiency anemia, unspecified iron deficiency anemia type     Past Surgical  "History:   Procedure Laterality Date     C HIP ARTHROSCOPY, DX  6/08    (R) Anterior superior labral debridement.      ESOPHAGOSCOPY, GASTROSCOPY, DUODENOSCOPY (EGD), COMBINED  11/1/2010    EGD     THYROIDECTOMY   2002    S/p thyroidectomy 2002       Social History   Substance Use Topics     Smoking status: Never Smoker     Smokeless tobacco: Never Used     Alcohol use Yes      Comment: occasional- been two years     Family History   Problem Relation Age of Onset     DIABETES Maternal Grandmother      Alcohol/Drug Maternal Grandmother      GASTROINTESTINAL DISEASE Paternal Grandmother      IBS     Hypertension Paternal Grandmother      CEREBROVASCULAR DISEASE Paternal Grandmother      Alzheimer Disease Paternal Grandmother      Hypertension Father      Arthritis Father      HEART DISEASE Father      cardimyopthy age 60, arrythmias     Lipids Father      Neurologic Disorder Sister      brain tumor     GASTROINTESTINAL DISEASE Mother      celiac     C.A.D. No family hx of      Breast Cancer No family hx of      Cancer - colorectal No family hx of            Reviewed and updated as needed this visit by clinical staff  Tobacco  Allergies  Meds  Problems  Med Hx  Surg Hx  Fam Hx  Soc Hx        Reviewed and updated as needed this visit by Provider  Tobacco  Allergies  Meds  Problems  Med Hx  Surg Hx  Fam Hx  Soc Hx          ROS:  Constitutional, HEENT, cardiovascular, pulmonary, gi and gu systems are negative, except as otherwise noted.    OBJECTIVE:     /71  Pulse 73  Temp 98.2  F (36.8  C) (Oral)  Ht 5' 3.5\" (1.613 m)  Wt 140 lb 9.6 oz (63.8 kg)  LMP 04/11/2018 (Approximate)  SpO2 98%  BMI 24.52 kg/m2  Body mass index is 24.52 kg/(m^2).  GENERAL: healthy, alert and no distress  RESP: lungs clear to auscultation - no rales, rhonchi or wheezes  CV: regular rate and rhythm, normal S1 S2, no S3 or S4, no murmur, click or rub, no peripheral edema and peripheral pulses strong  ABDOMEN: soft, " nontender, no hepatosplenomegaly, no masses and bowel sounds normal   (female): normal female external genitalia, normal urethral meatus, vaginal mucosa, normal cervix/adnexa/uterus without masses or discharge-however, patient has left adnexal tenderness with palpation  MS: no gross musculoskeletal defects noted, no edema  SKIN: no suspicious lesions or rashes  BACK: no CVA tenderness, no paralumbar tenderness    ASSESSMENT/PLAN:   1. Pelvic pain in female  We discussed her symptoms and exam findings. Reviewed possible gynecologic and non-gynecologic etiologies of her symptoms. Plan pelvic ultrasound first for evaluation. Warning signs to monitor for and report immediately discussed with patient and she verbalizes understanding. Will follow up with patient when results available.  - US Pelvic Complete with Transvaginal    MING Caldwell [facility]

## 2018-04-24 NOTE — TELEPHONE ENCOUNTER
Prescription approved per [facility] Refill Protocol.    Smihta SALCEDO RN

## 2018-04-25 ENCOUNTER — RADIANT APPOINTMENT (OUTPATIENT)
Dept: ULTRASOUND IMAGING | Facility: CLINIC | Age: 38
End: 2018-04-25
Attending: NURSE PRACTITIONER
Payer: COMMERCIAL

## 2018-04-25 ENCOUNTER — OFFICE VISIT (OUTPATIENT)
Dept: OBGYN | Facility: CLINIC | Age: 38
End: 2018-04-25
Payer: COMMERCIAL

## 2018-04-25 VITALS
WEIGHT: 140.6 LBS | HEIGHT: 64 IN | SYSTOLIC BLOOD PRESSURE: 112 MMHG | HEART RATE: 73 BPM | BODY MASS INDEX: 24.01 KG/M2 | TEMPERATURE: 98.2 F | DIASTOLIC BLOOD PRESSURE: 71 MMHG | OXYGEN SATURATION: 98 %

## 2018-04-25 DIAGNOSIS — R10.2 PELVIC PAIN IN FEMALE: Primary | ICD-10-CM

## 2018-04-25 PROCEDURE — 76856 US EXAM PELVIC COMPLETE: CPT

## 2018-04-25 PROCEDURE — 99213 OFFICE O/P EST LOW 20 MIN: CPT | Performed by: NURSE PRACTITIONER

## 2018-04-25 PROCEDURE — 76830 TRANSVAGINAL US NON-OB: CPT

## 2018-04-25 ASSESSMENT — PAIN SCALES - GENERAL: PAINLEVEL: NO PAIN (0)

## 2018-04-25 NOTE — NURSING NOTE
"Chief Complaint   Patient presents with     Pelvic Pain       Initial /71  Pulse 73  Temp 98.2  F (36.8  C) (Oral)  Ht 5' 3.5\" (1.613 m)  Wt 140 lb 9.6 oz (63.8 kg)  LMP 04/11/2018 (Approximate)  SpO2 98%  BMI 24.52 kg/m2 Estimated body mass index is 24.52 kg/(m^2) as calculated from the following:    Height as of this encounter: 5' 3.5\" (1.613 m).    Weight as of this encounter: 140 lb 9.6 oz (63.8 kg)..  BP completed using cuff size: gene David CMA    "

## 2018-04-25 NOTE — MR AVS SNAPSHOT
After Visit Summary   4/25/2018    Kimberley Choe    MRN: [id number]           Patient Information     Date Of Birth          1980        Visit Information        Provider Department      4/25/2018 10:30 AM Daria Akers APRN CNP [facility] [place]        Today's Diagnoses     Pelvic pain in female    -  1       Follow-ups after your visit        Your next 10 appointments already scheduled     Apr 25, 2018  1:30 PM CDT   US PELVIC COMPLETE W TRANSVAGINAL with BEUS1   [facility] [place] ([facility]    [address]  [place] [id number] [phone]           Please bring a list of your medicines (including vitamins, minerals and over-the-counter drugs). Also, tell your doctor about any allergies you may have. Wear comfortable clothes and leave your valuables at home.  Adults: Drink six 8-ounce glasses of fluid one hour before your exam. Do NOT empty your bladder.  If you need to empty your bladder before your exam, try to release only a little bit of urine. Then, drink another 8oz glass of fluid.  Children: Children who are potty trained should drink at least 4 cups (32 oz) of liquid 45 minutes to one hour prior to the exam. The child s bladder must be full in order to achieve a diagnostic exam. If your child is very uncomfortable or has an urgent need to pee, please notify a technologist; they will try to find out how much longer the wait may be and provide instructions to help relieve the pressure. Occasionally it is medically necessary to insert a urinary catheter to fill the bladder.  Please call the Imaging Department at your exam site with any questions.            Aug 15, 2018 12:15 PM CDT   Masonic Lab Draw with  MASONIC LAB DRAW   [facility] Masonic Lab Draw ([facility] [place])    [address]  [address]  [place] [id number] [phone]            Feb 13, 2019  9:30 AM [facility]   [facility] Lab Draw [facility]   [facility]   [facility] Lab [facility] ([facility])    [address]  [address]  [place] [address] [phone]            Feb 13, 2019 10:00 AM CST   (Arrive by 9:45 AM)   Return Visit with Chelsie Angulo MD   [facility] ([facility])    [address]  [address]  [place] [address] [phone]              Who to contact     If you have questions or need follow up information about today's clinic visit or your schedule please contact [facility] directly at [phone].  Normal or non-critical lab and imaging results will be communicated to you by MyChart, letter or phone within 4 business days after the clinic has received the results. If you do not hear from us within 7 days, please contact the clinic through [employer] or phone. If you have a critical or abnormal lab result, we will notify you by phone as soon as possible.  Submit refill requests through [employer] or call your pharmacy and they will forward the refill request to us. Please allow 3 business days for your refill to be completed.          Additional Information About Your Visit        [employer] Information     [employer] gives you secure access to your electronic health record. If you see a primary care provider, you can also send messages to your care team and make appointments. If you have questions, please call your primary care clinic.  If you do not have a primary care provider, please call [phone] and they will assist you.        Care EveryWhere ID     This is your Care EveryWhere ID. This could be used by other organizations to access your [place] medical records  [id number]        Your Vitals Were     Pulse Temperature Height Last Period Pulse Oximetry BMI (Body Mass Index)    73 98.2  F (36.8  C) (Oral) 5' 3.5\" (1.613 m) 04/11/2018 (Approximate) 98% 24.52 kg/m2       Blood Pressure from Last 3 Encounters:   04/25/18 " 112/71   02/14/18 116/76   12/21/17 110/67    Weight from Last 3 Encounters:   04/25/18 140 lb 9.6 oz (63.8 kg)   02/14/18 136 lb 9.6 oz (62 kg)   12/13/17 142 lb (64.4 kg)              We Performed the Following     US Pelvic Complete with Transvaginal          Today's Medication Changes          These changes are accurate as of 4/25/18 11:13 AM.  If you have any questions, ask your nurse or doctor.               Stop taking these medicines if you haven't already. Please contact your care team if you have questions.     IRON SUPPLEMENT PO   Stopped by:  Daria Akers APRN CNP                    Primary Care Provider Fax #    Physician No Ref-Primary [phone]       No address on file        Equal Access to Services     [place] SHULTZ : Geeta Louis, wagee hopson, qaybta kaalmaclaudia brown, darío coleman . So [facility] [phone].    ATENCIÓN: Si habla español, tiene a santoro disposición servicios gratuitos de asistencia lingüística. Llame al [phone].    We comply with applicable federal civil rights laws and [place] laws. We do not discriminate on the basis of race, color, national origin, age, disability, sex, sexual orientation, or gender identity.            Thank you!     Thank you for choosing [facility]  [facility]. Our goal is always to provide you with excellent care. Hearing back from our patients is one way we can continue to improve our services. Please take a few minutes to complete the written survey that you may receive in the mail after your visit with us. Thank you!             Your Updated Medication List - Protect others around you: Learn how to safely use, store and throw away your medicines at www.disposemymeds.org.          This list is accurate as of 4/25/18 11:13 AM.  Always use your most recent med list.                   Brand Name Dispense Instructions for use Diagnosis    levothyroxine 125 MCG tablet     SYNTHROID/LEVOTHROID    30 tablet    TAKE ONE TABLET BY MOUTH ONCE DAILY    Postoperative hypothyroidism       SUMAtriptan 50 MG tablet    IMITREX     Take 50 mg by mouth as needed    Iron deficiency anemia, unspecified iron deficiency anemia type       VITAMIN D (CHOLECALCIFEROL) PO      Take 1,000 Units by mouth daily

## 2018-04-27 ENCOUNTER — TELEPHONE (OUTPATIENT)
Dept: OBGYN | Facility: CLINIC | Age: 38
End: 2018-04-27

## 2018-04-27 NOTE — TELEPHONE ENCOUNTER
Spoke with patient. Discussed ultrasound results and management options. Patient would like to think about the options and will either call back or can send [employer] message once decision has been made. Also encouraged to reach out to me if she has additional concerns or questions. Daria LAI CNP

## 2018-05-23 ENCOUNTER — OFFICE VISIT (OUTPATIENT)
Dept: CARDIOLOGY | Facility: CLINIC | Age: 38
End: 2018-05-23
Payer: COMMERCIAL

## 2018-05-23 VITALS
HEIGHT: 64 IN | HEART RATE: 84 BPM | WEIGHT: 140 LBS | BODY MASS INDEX: 23.9 KG/M2 | DIASTOLIC BLOOD PRESSURE: 74 MMHG | SYSTOLIC BLOOD PRESSURE: 122 MMHG

## 2018-05-23 DIAGNOSIS — Z82.79 FAMILY HISTORY OF CONGENITAL HEART DISEASE: ICD-10-CM

## 2018-05-23 DIAGNOSIS — Z13.6 CARDIOVASCULAR SCREENING; LDL GOAL LESS THAN 160: ICD-10-CM

## 2018-05-23 DIAGNOSIS — R53.83 FATIGUE, UNSPECIFIED TYPE: ICD-10-CM

## 2018-05-23 DIAGNOSIS — R01.1 UNDIAGNOSED CARDIAC MURMURS: Primary | ICD-10-CM

## 2018-05-23 PROCEDURE — 93005 ELECTROCARDIOGRAM TRACING: CPT | Performed by: INTERNAL MEDICINE

## 2018-05-23 PROCEDURE — 99204 OFFICE O/P NEW MOD 45 MIN: CPT | Performed by: INTERNAL MEDICINE

## 2018-05-23 NOTE — MR AVS SNAPSHOT
After Visit Summary   5/23/2018    Kimberley Choe    MRN: [id number]           Patient Information     Date Of Birth          1980        Visit Information        Provider Department      5/23/2018 1:15 PM Gini Smiley MD [facility]   [place]        Today's Diagnoses     Undiagnosed cardiac murmurs    -  1    CARDIOVASCULAR SCREENING; LDL GOAL LESS THAN 160        Family history of congenital heart disease        Fatigue, unspecified type           Follow-ups after your visit        Additional Services     Follow-Up with Cardiac Advanced Practice Provider                 Your next 10 appointments already scheduled     May 31, 2018  9:30 AM CDT   MR NECK W CONTRAST ANGIOGRAM with SCIMR1   [facility] [place] ([facility]ovascular [facility] at [facility])    [address]  [address]  [place] [id number] [phone]           Take your medicines as usual, unless your doctor tells you not to. Bring a list of your current medicines to your exam (including vitamins, minerals and over-the-counter drugs).  You may or may not receive intravenous (IV) contrast for this exam pending the discretion of the Radiologist.  You do not need to do anything special to prepare.  The MRI machine uses a strong magnet. Please wear clothes without metal (snaps, zippers). A sweatsuit works well, or we may give you a hospital gown.  Please remove any body piercings and hair extensions before you arrive. You will also remove watches, jewelry, hairpins, wallets, dentures, partial dental plates and hearing aids. You may wear contact lenses, and you may be able to wear your rings. We have a safe place to keep your personal items, but it is safer to leave them at home.  **IMPORTANT** THE INSTRUCTIONS BELOW ARE ONLY FOR THOSE PATIENTS WHO HAVE BEEN PRESCRIBED SEDATION OR GENERAL ANESTHESIA DURING THEIR MRI PROCEDURE:  IF YOUR DOCTOR PRESCRIBED ORAL SEDATION  (take medicine to help you relax during your exam):   You must get the medicine from your doctor (oral medication) before you arrive. Bring the medicine to the exam. Do not take it at home. You ll be told when to take it upon arriving for your exam.   Arrive one hour early. Bring someone who can take you home after the test. Your medicine will make you sleepy. After the exam, you may not drive, take a bus or take a taxi by yourself.  IF YOUR DOCTOR PRESCRIBED IV SEDATION:   Arrive one hour early. Bring someone who can take you home after the test. Your medicine will make you sleepy. After the exam, you may not drive, take a bus or take a taxi by yourself.   No eating 6 hours before your exam. You may have clear liquids up until 4 hours before your exam. (Clear liquids include water, clear tea, black coffee and fruit juice without pulp.)  IF YOUR DOCTOR PRESCRIBED ANESTHESIA (be asleep for your exam):   Arrive 1 1/2 hours early. Bring someone who can take you home after the test. You may not drive, take a bus or take a taxi by yourself.   No eating 8 hours before your exam. You may have clear liquids up until 4 hours before your exam. (Clear liquids include water, clear tea, black coffee and fruit juice without pulp.)   You will spend four to five hours in the recovery room.  Please call the Imaging Department at your exam site with any questions.            May 31, 2018 10:30 AM CDT   Holter Monitor with MILTON   [facility] - [facility] ([facility])    [address]  [place] [facility] [address] [phone]            Jun 12, 2018 10:50 AM CDT   Return Visit with MING Serrano CNP   [facility]   [place] ([facility])    [address]  [place] [address]   [phone] OPT 2            Aug 15, 2018 12:15 PM CDT   Masonic Lab Draw with  MASONIC LAB DRAW   [facility] Masonic Lab Draw ([facility])    [address]  [address]  [place] [address]   [phone]            Feb 13, 2019  9:30 AM CST   Masonic Lab Draw with  MASONIC LAB DRAW   [facility] ([facility])    [address]  [address]  [place] [address]   [phone]            Feb 13, 2019 10:00 AM CST   (Arrive by 9:45 AM)   Return Visit with Chelsie Angulo MD   [facility] ([facility])    [address] [place] [address]  [address]  [place] [address]   [phone]              Future tests that were ordered for you today     Open Future Orders        Priority Expected Expires Ordered    MRA Angiogram Neck w Contrast Routine 5/23/2018 5/23/2019 5/23/2018    Follow-Up with Cardiac Advanced Practice Provider Routine 6/6/2018 5/22/2020 5/23/2018    Holter Monitor 24 hour - Adult Routine 5/23/2018 7/7/2018 5/23/2018    TSH with free T4 reflex Routine 5/23/2018 5/23/2019 5/23/2018    Overnight oximetry study Routine 5/30/2018 5/23/2019 5/23/2018            Who to contact     If you have questions or need follow up information about today's clinic visit or your schedule please contact [facility] directly at [phone].  Normal or non-critical lab and imaging results will be communicated to you by MyChart, letter or phone within 4 business days after the clinic has received the results. If you do not hear from us within 7 days, please contact the clinic through [employer] or phone. If you have a critical or abnormal lab result, we will notify you by phone as soon as possible.  Submit refill requests through [employer] or call your pharmacy and they will forward the refill request to us. Please allow 3 business days for your refill to be completed.          Additional Information About Your Visit        MyChart Information     [employer] gives you secure access to your electronic health record. If  "you see a primary care provider, you can also send messages to your care team and make appointments. If you have questions, please call your primary care clinic.  If you do not have a primary care provider, please call [phone] and they will assist you.        Care EveryWhere ID     This is your Care EveryWhere ID. This could be used by other organizations to access your [place] medical records  [id number]        Your Vitals Were     Pulse Height BMI (Body Mass Index)             84 1.613 m (5' 3.5\") 24.41 kg/m2          Blood Pressure from Last 3 Encounters:   05/23/18 122/74   04/25/18 112/71   02/14/18 116/76    Weight from Last 3 Encounters:   05/23/18 63.5 kg (140 lb)   04/25/18 63.8 kg (140 lb 9.6 oz)   02/14/18 62 kg (136 lb 9.6 oz)              We Performed the Following     EKG 12-lead complete w/read - Clinics (performed today)        Primary Care Provider Fax #    Physician No Ref-Primary [phone]       No address on file        Equal Access to Services     [facility] : Hadii kevon álvarez Somanuel, waaxda luelizabeth, qaybta kaalmaclaudia brown, darío coleman . So [facility] [phone].    ATENCIÓN: Si habla español, tiene a santoro disposición servicios gratuitos de asistencia lingüística. Regine al [phone].    We comply with applicable federal civil rights laws and [place] laws. We do not discriminate on the basis of race, color, national origin, age, disability, sex, sexual orientation, or gender identity.            Thank you!     Thank you for choosing [facility]  for your care. Our goal is always to provide you with excellent care. Hearing back from our patients is one way we can continue to improve our services. Please take a few minutes to complete the written survey that you may receive in the mail after your visit with us. Thank you!             Your Updated Medication List - Protect others around you: Learn how to safely " use, store and throw away your medicines at www.disposemymeds.org.          This list is accurate as of 5/23/18  3:27 PM.  Always use your most recent med list.                   Brand Name Dispense Instructions for use Diagnosis    levothyroxine 125 MCG tablet    SYNTHROID/LEVOTHROID    30 tablet    TAKE ONE TABLET BY MOUTH ONCE DAILY    Postoperative hypothyroidism       SUMAtriptan 50 MG tablet    IMITREX     Take 50 mg by mouth as needed    Iron deficiency anemia, unspecified iron deficiency anemia type       VITAMIN D (CHOLECALCIFEROL) PO      Take 1,000 Units by mouth daily

## 2018-05-23 NOTE — PROGRESS NOTES
"Service Date: 05/23/2018      REASON FOR VISIT:  New patient visit for \"dizzy spells.\"      HISTORY OF PRESENT ILLNESS:  Ms. Choe is a very pleasant 37-year-old lady who states she has had a longstanding history of a sensation of a head rush and a pressure-like sensation in her head when she stands from a seated position.  It lasts about 20 seconds and she states she can hear her heartbeat in her ears.  She underwent MRA of the head as well as MRI of the head with no abnormalities.  She states everything then gets back to normal.  She has also been having migraines and nonmigrainous headaches as well as general fatigue since last spring for which she is seeing Neurology.      Ms. Choe states that she has not experienced syncope but has on a couple of occasions felt like she might be about to pass out with these changes in position.  She states it is worse in the morning and she has to get out of bed quite slowly.  She notices her heart rate increases quite a bit with a change in position such as up to the 120s.  She has no other cardiorespiratory complaints with these spells.  She thinks that the spells may have gotten better with an iron infusion she recently had.  She has never been frankly anemic, however.      She had a normal echocardiogram in 2014.  She wonders if her decreased ferritin was related to her celiac disease.      Again, she has no exertional complaints of chest pain or shortness of breath or lightheadedness but only the symptoms above with a change in position.  She also states she has been \"super fatigued\" over the past 2 years and she just feels sleepy throughout the day.  She stopped exercising on this basis.  Again, she has no clear palpitations other than awareness of her heart rate going up with these changes in position.  She had postural orthostatics that were not remarkable but she states she did not have any symptoms with them either.      She is hypothyroid on replacement and has not " had a recent TSH.  She has never been screened for sleep apnea.  She has, what she thinks, is a reasonable fluid intake but cannot clearly quantify it.      ASSESSMENT AND PLAN:     1.  Pleasant 37-year-old lady with possible mild postural orthostatism.  She did not experience it on postural orthostatics done in the clinic but these were quite gradual.  We have discussed mild forms of autonomic dysfunction and she does not think she is likely to be dehydrated.  I have discussed a primary measure of increased oral fluid, nonetheless, perhaps trying a liter and a half of clear water a day as well as a small Gatorade every day and seeing if that makes any improvement.  We will check a TSH as well.   2.  With the reports of heart rates up to the 120s with simple activities, I do think it is reasonable to check a Holter monitor to exclude any sort of paroxysmal rhythmic basis.  She gets symptoms daily and is quite confident it will be captured by a 24-hour Holter.   3.  We will also check an overnight oximetry as she awakens fatigued and sleepy during the day.   4.  With the longstanding headaches as well as some concern that she expresses of having, for instance, lost her vision with turning her head to the left once and unusual neck discomforts in the past, I would do an MRI of her carotids and vertebral arteries to exclude any sort of dissection or other abnormalities.   5.  We have further discussed isotonic contractions with changes in position, recruiting large muscle groups to improve preload, tilt training, strength training with the lower extremities and compression, the latter being less likely to be helpful with the need for abdominal compression as well which is somewhat difficult to tolerate in the summer.      She will come back in followup of the above tests and will make further recommendations at that point.  It is a pleasure being involved in her care.   Total time is 45 minutes, 40 in coordination of  care and counseling.      MD SONDRA Parks MD             D: 2018   T: 2018   MT: JOSE LUIS      Name:     MARY FIELDS   MRN:      [id number]        Account:      [id number]   :      1980           Service Date: 2018      Document: [id number]

## 2018-05-23 NOTE — PROGRESS NOTES
DIAGNOSES:      Encounter Diagnoses   Name Primary?     Undiagnosed cardiac murmurs Yes     CARDIOVASCULAR SCREENING; LDL GOAL LESS THAN 160      Family history of congenital heart disease      Fatigue, unspecified type          HPI:  See [id number]        MEDICATIONS:    Current Outpatient Prescriptions   Medication Sig Dispense Refill     levothyroxine (SYNTHROID/LEVOTHROID) 125 MCG tablet TAKE ONE TABLET BY MOUTH ONCE DAILY 30 tablet 0     SUMAtriptan (IMITREX) 50 MG tablet Take 50 mg by mouth as needed       VITAMIN D, CHOLECALCIFEROL, PO Take 1,000 Units by mouth daily           ALLERGIES     Allergies   Allergen Reactions     Gluten Other (See Comments)     Celiac disease     Macrobid [Nitrofuran Derivatives] Nausea and Vomiting     Zithromax [Azithromycin Dihydrate] Nausea and Vomiting       PAST MEDICAL HISTORY:  Past Medical History:   Diagnosis Date     Anemia, unspecified     with pregnancy 6/06     Dysmenorrhea 2/1/2012     GERD (gastroesophageal reflux disease) 1/30/2014     Papanicolaou smear of cervix with low grade squamous intraepithelial lesion (LGSIL) 7/09    Colpo negative     Right hip labral tear 8/24/2006     Unspecified closed fracture of ankle     Left foot     Unspecified disorder of thyroid 10/20/2002    Thyroid disease, goiter nodules, S/p thyroidectomy 2002       PAST SURGICAL HISTORY:  Past Surgical History:   Procedure Laterality Date     C HIP ARTHROSCOPY, DX  6/08    (R) Anterior superior labral debridement.      ESOPHAGOSCOPY, GASTROSCOPY, DUODENOSCOPY (EGD), COMBINED  11/1/2010    EGD     THYROIDECTOMY   2002    S/p thyroidectomy 2002       FAMILY HISTORY:  Family History   Problem Relation Age of Onset     DIABETES Maternal Grandmother      Alcohol/Drug Maternal Grandmother      GASTROINTESTINAL DISEASE Paternal Grandmother      IBS     Hypertension Paternal Grandmother      CEREBROVASCULAR DISEASE Paternal Grandmother      Alzheimer Disease Paternal Grandmother       "Hypertension Father      Arthritis Father      HEART DISEASE Father      cardimyopthy age 60, arrythmias     Lipids Father      Neurologic Disorder Sister      brain tumor     GASTROINTESTINAL DISEASE Mother      celiac     C.A.D. No family hx of      Breast Cancer No family hx of      Cancer - colorectal No family hx of        SOCIAL HISTORY:  Social History     Social History     Marital status:      Spouse name: N/A     Number of children: 2     Years of education: N/A     Occupational History      [employer]     Social History Main Topics     Smoking status: Never Smoker     Smokeless tobacco: Never Used     Alcohol use Yes      Comment: rare     Drug use: No     Sexual activity: Yes     Partners: Male     Birth control/ protection: Male Surgical      Comment: Vasectomy     Other Topics Concern     Parent/Sibling W/ Cabg, Mi Or Angioplasty Before 65f 55m? No     Exercise Yes     ride horses     Social History Narrative       Review of Systems:  Skin:  Negative     Eyes:  Negative    ENT:  Positive for tinnitus (pounding when standing up or changing positions)  Respiratory:  Negative    Cardiovascular:    Positive for;dizziness;lightheadedness;fatigue;palpitations  Gastroenterology: Negative    Genitourinary:  not assessed    Musculoskeletal:  Negative    Neurologic:  Positive for headaches  Psychiatric:  Negative    Heme/Lymph/Imm:  Positive for allergies (gluten)  Endocrine:  Positive for thyroid disorder    Physical Exam:  Vitals: /74 (Patient Position: Standing)  Pulse 84  Ht 1.613 m (5' 3.5\")  Wt 63.5 kg (140 lb)  BMI 24.41 kg/m2    Constitutional:  cooperative, alert and oriented, well developed, well nourished, in no acute distress        Skin:  warm and dry to the touch, no apparent skin lesions or masses noted        Head:  normocephalic, no masses or lesions        Eyes:  pupils equal and round, conjunctivae and lids unremarkable, sclera white, no xanthalasma, EOMS " intact, no nystagmus        ENT:  no pallor or cyanosis, dentition good        Neck:  carotid pulses are full and equal bilaterally, JVP normal, no carotid bruit        Chest:  normal breath sounds, clear to auscultation, normal A-P diameter, normal symmetry, normal respiratory excursion, no use of accessory muscles        Cardiac: regular rhythm, normal S1/S2, no S3 or S4, apical impulse not displaced, no murmurs, gallops or rubs                  Abdomen:  abdomen soft, non-tender, BS normoactive, no mass, no HSM, no bruits        Vascular: pulses full and equal                                        Extremities and Back:  no deformities, clubbing, cyanosis, erythema observed;no edema              Neurological:  affect appropriate;no gross motor deficits          ASSESSMENT AND PLAN:    See dictation    ORDERS AT TODAY'S VISIT:    Orders Placed This Encounter   Procedures     MRA Angiogram Neck w Contrast     TSH with free T4 reflex     Follow-Up with Cardiac Advanced Practice Provider     EKG 12-lead complete w/read - Clinics (performed today)     Holter Monitor 24 hour - Adult     Overnight oximetry study           CC  No referring provider defined for this encounter.

## 2018-05-23 NOTE — LETTER
5/23/2018    Physician No Ref-Primary  No address on file    RE: Kimberley Choe       Dear Colleague,    I had the pleasure of seeing Kimberley Choe in the [facility].    DIAGNOSES:      Encounter Diagnoses   Name Primary?     Undiagnosed cardiac murmurs Yes     CARDIOVASCULAR SCREENING; LDL GOAL LESS THAN 160      Family history of congenital heart disease      Fatigue, unspecified type          HPI:  See [id number]        MEDICATIONS:    Current Outpatient Prescriptions   Medication Sig Dispense Refill     levothyroxine (SYNTHROID/LEVOTHROID) 125 MCG tablet TAKE ONE TABLET BY MOUTH ONCE DAILY 30 tablet 0     SUMAtriptan (IMITREX) 50 MG tablet Take 50 mg by mouth as needed       VITAMIN D, CHOLECALCIFEROL, PO Take 1,000 Units by mouth daily           ALLERGIES     Allergies   Allergen Reactions     Gluten Other (See Comments)     Celiac disease     Macrobid [Nitrofuran Derivatives] Nausea and Vomiting     Zithromax [Azithromycin Dihydrate] Nausea and Vomiting       PAST MEDICAL HISTORY:  Past Medical History:   Diagnosis Date     Anemia, unspecified     with pregnancy 6/06     Dysmenorrhea 2/1/2012     GERD (gastroesophageal reflux disease) 1/30/2014     Papanicolaou smear of cervix with low grade squamous intraepithelial lesion (LGSIL) 7/09    Colpo negative     Right hip labral tear 8/24/2006     Unspecified closed fracture of ankle     Left foot     Unspecified disorder of thyroid 10/20/2002    Thyroid disease, goiter nodules, S/p thyroidectomy 2002       PAST SURGICAL HISTORY:  Past Surgical History:   Procedure Laterality Date     C HIP ARTHROSCOPY, DX  6/08    (R) Anterior superior labral debridement.      ESOPHAGOSCOPY, GASTROSCOPY, DUODENOSCOPY (EGD), COMBINED  11/1/2010    EGD     THYROIDECTOMY   2002    S/p thyroidectomy 2002       FAMILY HISTORY:  Family History   Problem Relation Age of Onset     DIABETES Maternal Grandmother      Alcohol/Drug Maternal Grandmother   "    GASTROINTESTINAL DISEASE Paternal Grandmother      IBS     Hypertension Paternal Grandmother      CEREBROVASCULAR DISEASE Paternal Grandmother      Alzheimer Disease Paternal Grandmother      Hypertension Father      Arthritis Father      HEART DISEASE Father      cardimyopthy age 60, arrythmias     Lipids Father      Neurologic Disorder Sister      brain tumor     GASTROINTESTINAL DISEASE Mother      celiac     C.A.D. No family hx of      Breast Cancer No family hx of      Cancer - colorectal No family hx of        SOCIAL HISTORY:  Social History     Social History     Marital status:      Spouse name: N/A     Number of children: 2     Years of education: N/A     Occupational History      [employer]     Social History Main Topics     Smoking status: Never Smoker     Smokeless tobacco: Never Used     Alcohol use Yes      Comment: rare     Drug use: No     Sexual activity: Yes     Partners: Male     Birth control/ protection: Male Surgical      Comment: Vasectomy     Other Topics Concern     Parent/Sibling W/ Cabg, Mi Or Angioplasty Before 65f 55m? No     Exercise Yes     ride horses     Social History Narrative       Review of Systems:  Skin:  Negative     Eyes:  Negative    ENT:  Positive for tinnitus (pounding when standing up or changing positions)  Respiratory:  Negative    Cardiovascular:    Positive for;dizziness;lightheadedness;fatigue;palpitations  Gastroenterology: Negative    Genitourinary:  not assessed    Musculoskeletal:  Negative    Neurologic:  Positive for headaches  Psychiatric:  Negative    Heme/Lymph/Imm:  Positive for allergies (gluten)  Endocrine:  Positive for thyroid disorder    Physical Exam:  Vitals: /74 (Patient Position: Standing)  Pulse 84  Ht 1.613 m (5' 3.5\")  Wt 63.5 kg (140 lb)  BMI 24.41 kg/m2    Constitutional:  cooperative, alert and oriented, well developed, well nourished, in no acute distress        Skin:  warm and dry to the touch, no " apparent skin lesions or masses noted        Head:  normocephalic, no masses or lesions        Eyes:  pupils equal and round, conjunctivae and lids unremarkable, sclera white, no xanthalasma, EOMS intact, no nystagmus        ENT:  no pallor or cyanosis, dentition good        Neck:  carotid pulses are full and equal bilaterally, JVP normal, no carotid bruit        Chest:  normal breath sounds, clear to auscultation, normal A-P diameter, normal symmetry, normal respiratory excursion, no use of accessory muscles        Cardiac: regular rhythm, normal S1/S2, no S3 or S4, apical impulse not displaced, no murmurs, gallops or rubs                  Abdomen:  abdomen soft, non-tender, BS normoactive, no mass, no HSM, no bruits        Vascular: pulses full and equal                                        Extremities and Back:  no deformities, clubbing, cyanosis, erythema observed;no edema              Neurological:  affect appropriate;no gross motor deficits          ASSESSMENT AND PLAN:    See dictation    ORDERS AT TODAY'S VISIT:    Orders Placed This Encounter   Procedures     MRA Angiogram Neck w Contrast     TSH with free T4 reflex     Follow-Up with Cardiac Advanced Practice Provider     EKG 12-lead complete w/read - Clinics (performed today)     Holter Monitor 24 hour - Adult     Overnight oximetry study           CC  No referring provider defined for this encounter.              Thank you for allowing me to participate in the care of your patient.      Sincerely,     Gini Smiley MD     [facility]    cc:   No referring provider defined for this encounter.

## 2018-05-31 ENCOUNTER — HOSPITAL ENCOUNTER (OUTPATIENT)
Dept: CARDIOLOGY | Facility: CLINIC | Age: 38
Discharge: HOME | End: 2018-05-31
Attending: INTERNAL MEDICINE | Admitting: INTERNAL MEDICINE
Payer: COMMERCIAL

## 2018-05-31 ENCOUNTER — HOSPITAL ENCOUNTER (OUTPATIENT)
Dept: MRI IMAGING | Facility: CLINIC | Age: 38
End: 2018-05-31
Attending: INTERNAL MEDICINE
Payer: COMMERCIAL

## 2018-05-31 DIAGNOSIS — R01.1 UNDIAGNOSED CARDIAC MURMURS: ICD-10-CM

## 2018-05-31 DIAGNOSIS — R53.83 FATIGUE, UNSPECIFIED TYPE: ICD-10-CM

## 2018-05-31 DIAGNOSIS — Z13.6 CARDIOVASCULAR SCREENING; LDL GOAL LESS THAN 160: ICD-10-CM

## 2018-05-31 DIAGNOSIS — Z82.79 FAMILY HISTORY OF CONGENITAL HEART DISEASE: ICD-10-CM

## 2018-05-31 PROCEDURE — 27210995 ZZH RX 272: Performed by: INTERNAL MEDICINE

## 2018-05-31 PROCEDURE — A9585 GADOBUTROL INJECTION: HCPCS | Performed by: INTERNAL MEDICINE

## 2018-05-31 PROCEDURE — 25000128 H RX IP 250 OP 636: Performed by: INTERNAL MEDICINE

## 2018-05-31 PROCEDURE — 70549 MR ANGIOGRAPH NECK W/O&W/DYE: CPT

## 2018-05-31 PROCEDURE — 93225 XTRNL ECG REC<48 HRS REC: CPT

## 2018-05-31 PROCEDURE — 93227 XTRNL ECG REC<48 HR R&I: CPT | Performed by: INTERNAL MEDICINE

## 2018-05-31 RX ORDER — GADOBUTROL 604.72 MG/ML
10 INJECTION INTRAVENOUS ONCE
Status: COMPLETED | OUTPATIENT
Start: 2018-05-31 | End: 2018-05-31

## 2018-05-31 RX ORDER — ACYCLOVIR 200 MG/1
30 CAPSULE ORAL ONCE
Status: COMPLETED | OUTPATIENT
Start: 2018-05-31 | End: 2018-05-31

## 2018-05-31 RX ADMIN — GADOBUTROL 10 ML: 604.72 INJECTION INTRAVENOUS at 10:32

## 2018-05-31 RX ADMIN — SODIUM CHLORIDE 30 ML: 9 INJECTION, SOLUTION INTRAMUSCULAR; INTRAVENOUS; SUBCUTANEOUS at 10:32

## 2018-06-02 DIAGNOSIS — R53.83 FATIGUE, UNSPECIFIED TYPE: ICD-10-CM

## 2018-06-02 DIAGNOSIS — Z82.79 FAMILY HISTORY OF CONGENITAL HEART DISEASE: ICD-10-CM

## 2018-06-02 DIAGNOSIS — R01.1 UNDIAGNOSED CARDIAC MURMURS: ICD-10-CM

## 2018-06-02 DIAGNOSIS — Z13.6 CARDIOVASCULAR SCREENING; LDL GOAL LESS THAN 160: ICD-10-CM

## 2018-06-02 PROCEDURE — 84443 ASSAY THYROID STIM HORMONE: CPT | Performed by: INTERNAL MEDICINE

## 2018-06-02 PROCEDURE — 84439 ASSAY OF FREE THYROXINE: CPT | Performed by: INTERNAL MEDICINE

## 2018-06-02 PROCEDURE — 36415 COLL VENOUS BLD VENIPUNCTURE: CPT | Performed by: INTERNAL MEDICINE

## 2018-06-04 LAB
INTERPRETATION MONITOR -MUSE: NORMAL
T4 FREE SERPL-MCNC: 1.2 NG/DL (ref 0.76–1.46)
TSH SERPL DL<=0.005 MIU/L-ACNC: 4.2 MU/L (ref 0.4–4)

## 2018-06-05 DIAGNOSIS — E89.0 POSTOPERATIVE HYPOTHYROIDISM: ICD-10-CM

## 2018-06-06 RX ORDER — LEVOTHYROXINE SODIUM 125 UG/1
TABLET ORAL
Qty: 90 TABLET | Refills: 0 | Status: SHIPPED | OUTPATIENT
Start: 2018-06-06 | End: 2018-10-08

## 2018-06-06 NOTE — TELEPHONE ENCOUNTER
Routing refill request to provider for review/approval because:  Labs out of range:  See below    Jeny Buck RN

## 2018-06-06 NOTE — TELEPHONE ENCOUNTER
Covering for Dr. Randall    Refilled Synthroid, recent thyroid function labs normal.     MING Pastrana CNP

## 2018-06-06 NOTE — TELEPHONE ENCOUNTER
"Requested Prescriptions   Pending Prescriptions Disp Refills     levothyroxine (SYNTHROID/LEVOTHROID) 125 MCG tablet [Pharmacy Med Name: LEVOTHYROXIN 125MCG TAB]  Last Written Prescription Date:  04/24/18  Last Fill Quantity: 30,  # refills: 0   Last office visit: 10/18/2017 with prescribing provider:  10/18/17   Future Office Visit:   Next 5 appointments (look out 90 days)     Jun 12, 2018 10:50 AM CDT   Return Visit with MING Serrano CNP   [facility]    [address] [place] [id number] [phone] OPT 2               30 tablet 0     Sig: TAKE 1 TABLET BY MOUTH ONCE DAILY    Thyroid Protocol Failed    6/5/2018  5:43 PM       Failed - Normal TSH on file in past 12 months    Recent Labs   Lab Test  06/02/18   1200   TSH  4.20*          Passed - Patient is 12 years or older       Passed - Recent (12 mo) or future (30 days) visit within the authorizing provider's specialty    Patient had office visit in the last 12 months or has a visit in the next 30 days with authorizing provider or within the authorizing provider's specialty.  See \"Patient Info\" tab in inbasket, or \"Choose Columns\" in Meds & Orders section of the refill encounter.           Passed - No active pregnancy on record    If patient is pregnant or has had a positive pregnancy test, please check TSH       Passed - No positive pregnancy test in past 12 months    If patient is pregnant or has had a positive pregnancy test, please check TSH          "

## 2018-06-12 ENCOUNTER — OFFICE VISIT (OUTPATIENT)
Dept: CARDIOLOGY | Facility: CLINIC | Age: 38
End: 2018-06-12
Attending: INTERNAL MEDICINE
Payer: COMMERCIAL

## 2018-06-12 VITALS
HEART RATE: 72 BPM | DIASTOLIC BLOOD PRESSURE: 74 MMHG | SYSTOLIC BLOOD PRESSURE: 112 MMHG | BODY MASS INDEX: 23.56 KG/M2 | HEIGHT: 64 IN | WEIGHT: 138 LBS

## 2018-06-12 DIAGNOSIS — R42 LIGHTHEADEDNESS: Primary | ICD-10-CM

## 2018-06-12 DIAGNOSIS — R53.83 FATIGUE, UNSPECIFIED TYPE: ICD-10-CM

## 2018-06-12 DIAGNOSIS — R00.0 RAPID HEART BEAT: ICD-10-CM

## 2018-06-12 PROCEDURE — 99214 OFFICE O/P EST MOD 30 MIN: CPT | Performed by: NURSE PRACTITIONER

## 2018-06-12 NOTE — LETTER
6/12/2018    Physician No Ref-Primary  No address on file    RE: Kimberley Choe       Dear Colleague,    I had the pleasure of seeing Kimberley Choe in the [facility].    Cardiology Clinic Progress Note  Kimberley Choe MRN# [id number]   YOB: 1980 Age: 37 year old     Reason For Visit: Follow up results (MRA neck and holter monitor)   Primary Cardiologist:   Dr. Smiley          History of Presenting Illness:    Kimberley Choe is a pleasant 37 year old patient who carries a  history significant for Lightheadedness, fatigue, and migraines.   She describes episodes of significant lightheadedness, black vision, and feelings of passing out with position changes or sudden movement of her neck.  She did mention that her heart rate would go rapid, as high as in the 120s during these episodes and was therefore referred to cardiology for evaluation.  She was recently seen by Dr. Smiley who ordered a MRA of the neck as well as a Holter monitor for further evaluation.  She comes to the office today for follow-up.  She continues to have episodes of headaches, lightheadedness, rapid heartbeats and feelings of passing out.  She denies chest pain, shortness of breath, PND, orthopnea,, syncope, or edema.    She admits to staying well hydrated, and remains very active as she is a mother of 3 children.  .  MRA of the neck confirmed: Carotids: The common carotid arteries bilaterally are widely patent. The cervical internal carotid arteries bilaterally are patent without stenosis. Vertebrals: The vertebral arteries bilaterally are patent withoutstenosis and demonstrate antegrade flow. Aortic arch: The arteries as they arise from the aortic arch arenormally arranged with no evidence for stenosis.  24-hour Holter monitor confirmed: 1) The principle rhythm was sinus. Th e average heart rate was 72 bpm. The minimum heart rate was 44 bpm at 04:08:00 on 01-June and maximum heart  rate was 129 bpm at 17:16:36 on 31-May. She had zero pauses greater than 2.0 seconds. The IA interval was .156 seconds, QRS duration was .094 seconds, and QT interval was .285-.453 seconds. 2) She had 176 isolated supraventricular ectopics and 3 couplets. 3) She had 0 ventricular ectopics. 4) She pushed the event button 10 times for headache, dizziness, shortness of breath, and palpitations. The rhythm was sinus with rates between  during this time. She had one isolated SVE when she specified that she had palpitations.  She was recommended to do an overnight oximetry due to her fatigue and sleepiness during the day, unfortunately this has yet to be completed.   Her recent TSH was elevated at 4.2, she is currently on Synthroid and has been recommended she follow-up with her PCP for further management    Blood pressure is well controlled at 112/74, heart rate 72.     Patient remains compliant with all medications.                   Assessment and Plan:     1. Lightheadedness -MRA of the neck negative for carotid stenosis or vertebral stenosis.  Encourage hydration.  Recommend she follow-up with her PCP for further management.  She plans to follow-up with neurology for her headaches.  2. Rapid heart rate-24 hour Holter monitor confirmed average rate of 72, with a single episode of HR reaching 129 bpm.   - no medication recommendation at this time. Continue to monitor for increased frequency and duration.   3. Fatigue -daytime.  Encouraged to follow-up with overnight oximetry.  Follow-up with PCP regarding thyroid level of 4.2.              Thank you for allowing me to participate in this delightful patient's care. She has been recommended to follow-up with her PCP and neurology for her noncardiac issues.  She can follow-up with the heart clinic as needed.        Beth Chowdray, MING, CNP          Review of Systems:   Review of Systems:  Skin:  Negative     Eyes:  Negative    ENT:  Positive for  "tinnitus  Respiratory:  Positive for shortness of breath  Cardiovascular:    Positive for;fatigue;dizziness;palpitations  Gastroenterology: Negative    Genitourinary:  not assessed    Musculoskeletal:  Negative    Neurologic:  Positive for headaches  Psychiatric:  Negative    Heme/Lymph/Imm:  Positive for allergies  Endocrine:  Positive for thyroid disorder              Physical Exam:     Vitals: /74  Pulse 72  Ht 1.613 m (5' 3.5\")  Wt 62.6 kg (138 lb)  BMI 24.06 kg/m2  Constitutional:  cooperative, alert and oriented, well developed, well nourished, in no acute distress        Skin:  warm and dry to the touch, no apparent skin lesions or masses noted        Head:       normocephalic    Eyes:  pupils equal and round, conjunctivae and lids unremarkable, sclera white, no xanthalasma, EOMS intact, no nystagmus        ENT:  no pallor or cyanosis, dentition good        Neck:  carotid pulses are full and equal bilaterally, JVP normal, no carotid bruit        Chest:  normal breath sounds, clear to auscultation, normal A-P diameter, normal symmetry, normal respiratory excursion, no use of accessory muscles        Cardiac: regular rhythm, normal S1/S2, no S3 or S4, apical impulse not displaced, no murmurs, gallops or rubs                  Abdomen:  abdomen soft, non-tender, BS normoactive, no mass, no HSM, no bruits        Vascular:                                        Extremities and Back:  no deformities, clubbing, cyanosis, erythema observed        Neurological:  no gross motor deficits                 Medications:     Current Outpatient Prescriptions   Medication Sig Dispense Refill     levothyroxine (SYNTHROID/LEVOTHROID) 125 MCG tablet TAKE 1 TABLET BY MOUTH ONCE DAILY 90 tablet 0     SUMAtriptan (IMITREX) 50 MG tablet Take 50 mg by mouth as needed       VITAMIN D, CHOLECALCIFEROL, PO Take 1,000 Units by mouth daily             Family History   Problem Relation Age of Onset     DIABETES Maternal Grandmother "      Alcohol/Drug Maternal Grandmother      GASTROINTESTINAL DISEASE Paternal Grandmother      IBS     Hypertension Paternal Grandmother      CEREBROVASCULAR DISEASE Paternal Grandmother      Alzheimer Disease Paternal Grandmother      Hypertension Father      Arthritis Father      HEART DISEASE Father      cardimyopthy age 60, arrythmias     Lipids Father      Neurologic Disorder Sister      brain tumor     GASTROINTESTINAL DISEASE Mother      celiac     C.A.D. No family hx of      Breast Cancer No family hx of      Cancer - colorectal No family hx of        Social History     Social History     Marital status:      Spouse name: N/A     Number of children: 2     Years of education: N/A     Occupational History      [employer]     Social History Main Topics     Smoking status: Never Smoker     Smokeless tobacco: Never Used     Alcohol use Yes      Comment: rare     Drug use: No     Sexual activity: Yes     Partners: Male     Birth control/ protection: Male Surgical      Comment: Vasectomy     Other Topics Concern     Parent/Sibling W/ Cabg, Mi Or Angioplasty Before 65f 55m? No     Exercise Yes     ride horses     Social History Narrative            Past Medical History:     Past Medical History:   Diagnosis Date     Anemia, unspecified     with pregnancy 6/06     Dysmenorrhea 2/1/2012     GERD (gastroesophageal reflux disease) 1/30/2014     Papanicolaou smear of cervix with low grade squamous intraepithelial lesion (LGSIL) 7/09    Colpo negative     Right hip labral tear 8/24/2006     Unspecified closed fracture of ankle     Left foot     Unspecified disorder of thyroid 10/20/2002    Thyroid disease, goiter nodules, S/p thyroidectomy 2002              Past Surgical History:     Past Surgical History:   Procedure Laterality Date     C HIP ARTHROSCOPY, DX  6/08    (R) Anterior superior labral debridement.      ESOPHAGOSCOPY, GASTROSCOPY, DUODENOSCOPY (EGD), COMBINED  11/1/2010    EGD      THYROIDECTOMY   2002    S/p thyroidectomy 2002              Allergies:   Gluten; Macrobid [nitrofuran derivatives]; and Zithromax [azithromycin dihydrate]       Data:   All laboratory data reviewed:    LAST CHOLESTEROL:  Lab Results   Component Value Date    CHOL 179 07/18/2013     Lab Results   Component Value Date    HDL 37 07/18/2013     Lab Results   Component Value Date     07/18/2013     Lab Results   Component Value Date    TRIG 133 07/18/2013     Lab Results   Component Value Date    CHOLHDLRATIO 4.8 07/18/2013       LAST BMP:  Lab Results   Component Value Date     09/26/2017      Lab Results   Component Value Date    POTASSIUM 4.1 09/26/2017     Lab Results   Component Value Date    CHLORIDE 105 09/26/2017     Lab Results   Component Value Date    LILIAN 8.7 09/26/2017     Lab Results   Component Value Date    CO2 27 09/26/2017     Lab Results   Component Value Date    BUN 12 09/26/2017     Lab Results   Component Value Date    CR 0.79 09/26/2017     Lab Results   Component Value Date    GLC 86 09/26/2017       LAST CBC:  Lab Results   Component Value Date    WBC 4.5 02/14/2018     Lab Results   Component Value Date    RBC 5.16 02/14/2018     Lab Results   Component Value Date    HGB 15.3 02/14/2018     Lab Results   Component Value Date    HCT 43.8 02/14/2018     Lab Results   Component Value Date    MCV 85 02/14/2018     Lab Results   Component Value Date    MCH 29.7 02/14/2018     Lab Results   Component Value Date    MCHC 34.9 02/14/2018     Lab Results   Component Value Date    RDW 12.3 02/14/2018     Lab Results   Component Value Date     02/14/2018             Thank you for allowing me to participate in the care of your patient.      Sincerely,     MING Serrano [facility]    cc:   Gini Smiley MD  [address]  [place] [id number]

## 2018-06-12 NOTE — MR AVS SNAPSHOT
After Visit Summary   6/12/2018    Kimberley Choe    MRN: [id number]           Patient Information     Date Of Birth          1980        Visit Information        Provider Department      6/12/2018 10:50 AM Beth Chowdary APRN CNP [facility]   [place]        Today's Diagnoses     Undiagnosed cardiac murmurs        CARDIOVASCULAR SCREENING; LDL GOAL LESS THAN 160        Family history of congenital heart disease        Fatigue, unspecified type          Care Instructions    Thanks for coming into [facility] today.  Reviewed results of holter monitor and MRA of neck.   Recommend follow up with PCP regarding TSH levels and treatment  Review with PCP need for neurologist.               Please call my nurse at [phone] with any questions or concerns.    Scheduling phone number: [phone]  Reminder: Please bring in all current medications, over the counter supplements and vitamin bottles to your next appointment.                Follow-ups after your visit        Your next 10 appointments already scheduled     Aug 15, 2018 12:15 PM CDT   [facility] Draw with  [facility]   [facility] ([facility])    [address] [place] [address]  [address] [place] [id number]   [phone]            Feb 13, 2019  9:30 AM CST   [facility] Lab Draw with  [facility] DRAW   [facility] ([facility])    [address] [place] [address]  [address] [place] [id number]   [phone]            Feb 13, 2019 10:00 AM CST   (Arrive by 9:45 AM)   Return Visit with Chelsie Angulo MD   [facility] ([facility])    [address] [place] [address]  [address] [place] [id number]   [phone]              Who to contact     If you have questions or need follow up information about today's clinic visit or your schedule please contact [facility]  "[facility]   [facility] directly at [phone].  Normal or non-critical lab and imaging results will be communicated to you by MyChart, letter or phone within 4 business days after the clinic has received the results. If you do not hear from us within 7 days, please contact the clinic through [employer] or phone. If you have a critical or abnormal lab result, we will notify you by phone as soon as possible.  Submit refill requests through [employer] or call your pharmacy and they will forward the refill request to us. Please allow 3 business days for your refill to be completed.          Additional Information About Your Visit        [employer] Information     [employer] gives you secure access to your electronic health record. If you see a primary care provider, you can also send messages to your care team and make appointments. If you have questions, please call your primary care clinic.  If you do not have a primary care provider, please call [phone] and they will assist you.        Care EveryWhere ID     This is your Care EveryWhere ID. This could be used by other organizations to access your [facility] medical records  [id number]        Your Vitals Were     Pulse Height BMI (Body Mass Index)             72 1.613 m (5' 3.5\") 24.06 kg/m2          Blood Pressure from Last 3 Encounters:   06/12/18 112/74   05/23/18 122/74   04/25/18 112/71    Weight from Last 3 Encounters:   06/12/18 62.6 kg (138 lb)   05/23/18 63.5 kg (140 lb)   04/25/18 63.8 kg (140 lb 9.6 oz)              We Performed the Following     Follow-Up with Cardiac Advanced Practice Provider        Primary Care Provider Fax #    Physician No Ref-Primary [phone]       No address on file        Equal Access to Services     STACY SHULTZ : Geeta Louis, toni hopson, darío robert. So [facility] [phone].    ATENCIÓN: Si habla español, tiene a santoro disposición servicios " lety de asistencia lingüística. Regine jim [phone].    We comply with applicable federal civil rights laws and [place] laws. We do not discriminate on the basis of race, color, national origin, age, disability, sex, sexual orientation, or gender identity.            Thank you!     Thank you for choosing [facility]  for your care. Our goal is always to provide you with excellent care. Hearing back from our patients is one way we can continue to improve our services. Please take a few minutes to complete the written survey that you may receive in the mail after your visit with us. Thank you!             Your Updated Medication List - Protect others around you: Learn how to safely use, store and throw away your medicines at www.disposemymeds.org.          This list is accurate as of 6/12/18 10:53 AM.  Always use your most recent med list.                   Brand Name Dispense Instructions for use Diagnosis    levothyroxine 125 MCG tablet    SYNTHROID/LEVOTHROID    90 tablet    TAKE 1 TABLET BY MOUTH ONCE DAILY    Postoperative hypothyroidism       SUMAtriptan 50 MG tablet    IMITREX     Take 50 mg by mouth as needed    Iron deficiency anemia, unspecified iron deficiency anemia type       VITAMIN D (CHOLECALCIFEROL) PO      Take 1,000 Units by mouth daily

## 2018-06-12 NOTE — PATIENT INSTRUCTIONS
Thanks for coming into [facility] today.  Reviewed results of holter monitor and MRA of neck.   Recommend follow up with PCP regarding TSH levels and treatment  Review with PCP need for neurologist.               Please call my nurse at [phone] with any questions or concerns.    Scheduling phone number: [phone]  Reminder: Please bring in all current medications, over the counter supplements and vitamin bottles to your next appointment.

## 2018-06-12 NOTE — PROGRESS NOTES
Cardiology Clinic Progress Note  Kimberley Choe MRN# [id number]   YOB: 1980 Age: 37 year old     Reason For Visit: Follow up results (MRA neck and holter monitor)   Primary Cardiologist:   Dr. Smiley          History of Presenting Illness:    Kimberley Choe is a pleasant 37 year old patient who carries a  history significant for Lightheadedness, fatigue, and migraines.   She describes episodes of significant lightheadedness, black vision, and feelings of passing out with position changes or sudden movement of her neck.  She did mention that her heart rate would go rapid, as high as in the 120s during these episodes and was therefore referred to cardiology for evaluation.  She was recently seen by Dr. Smiley who ordered a MRA of the neck as well as a Holter monitor for further evaluation.  She comes to the office today for follow-up.  She continues to have episodes of headaches, lightheadedness, rapid heartbeats and feelings of passing out.  She denies chest pain, shortness of breath, PND, orthopnea,, syncope, or edema.    She admits to staying well hydrated, and remains very active as she is a mother of 3 children.  .  MRA of the neck confirmed: Carotids: The common carotid arteries bilaterally are widely patent. The cervical internal carotid arteries bilaterally are patent without stenosis. Vertebrals: The vertebral arteries bilaterally are patent withoutstenosis and demonstrate antegrade flow. Aortic arch: The arteries as they arise from the aortic arch arenormally arranged with no evidence for stenosis.  24-hour Holter monitor confirmed: 1) The principle rhythm was sinus. Th e average heart rate was 72 bpm. The minimum heart rate was 44 bpm at 04:08:00 on 01-June and maximum heart rate was 129 bpm at 17:16:36 on 31-May. She had zero pauses greater than 2.0 seconds. The SD interval was .156 seconds, QRS duration was .094 seconds, and QT interval was .285-.453 seconds. 2) She had 176  isolated supraventricular ectopics and 3 couplets. 3) She had 0 ventricular ectopics. 4) She pushed the event button 10 times for headache, dizziness, shortness of breath, and palpitations. The rhythm was sinus with rates between  during this time. She had one isolated SVE when she specified that she had palpitations.  She was recommended to do an overnight oximetry due to her fatigue and sleepiness during the day, unfortunately this has yet to be completed.   Her recent TSH was elevated at 4.2, she is currently on Synthroid and has been recommended she follow-up with her PCP for further management    Blood pressure is well controlled at 112/74, heart rate 72.     Patient remains compliant with all medications.                   Assessment and Plan:     1. Lightheadedness -MRA of the neck negative for carotid stenosis or vertebral stenosis.  Encourage hydration.  Recommend she follow-up with her PCP for further management.  She plans to follow-up with neurology for her headaches.  2. Rapid heart rate-24 hour Holter monitor confirmed average rate of 72, with a single episode of HR reaching 129 bpm.   - no medication recommendation at this time. Continue to monitor for increased frequency and duration.   3. Fatigue -daytime.  Encouraged to follow-up with overnight oximetry.  Follow-up with PCP regarding thyroid level of 4.2.              Thank you for allowing me to participate in this delightful patient's care. She has been recommended to follow-up with her PCP and neurology for her noncardiac issues.  She can follow-up with the heart clinic as needed.        MING Serrano, CNP          Review of Systems:   Review of Systems:  Skin:  Negative     Eyes:  Negative    ENT:  Positive for tinnitus  Respiratory:  Positive for shortness of breath  Cardiovascular:    Positive for;fatigue;dizziness;palpitations  Gastroenterology: Negative    Genitourinary:  not assessed    Musculoskeletal:  Negative   "  Neurologic:  Positive for headaches  Psychiatric:  Negative    Heme/Lymph/Imm:  Positive for allergies  Endocrine:  Positive for thyroid disorder              Physical Exam:     Vitals: /74  Pulse 72  Ht 1.613 m (5' 3.5\")  Wt 62.6 kg (138 lb)  BMI 24.06 kg/m2  Constitutional:  cooperative, alert and oriented, well developed, well nourished, in no acute distress        Skin:  warm and dry to the touch, no apparent skin lesions or masses noted        Head:       normocephalic    Eyes:  pupils equal and round, conjunctivae and lids unremarkable, sclera white, no xanthalasma, EOMS intact, no nystagmus        ENT:  no pallor or cyanosis, dentition good        Neck:  carotid pulses are full and equal bilaterally, JVP normal, no carotid bruit        Chest:  normal breath sounds, clear to auscultation, normal A-P diameter, normal symmetry, normal respiratory excursion, no use of accessory muscles        Cardiac: regular rhythm, normal S1/S2, no S3 or S4, apical impulse not displaced, no murmurs, gallops or rubs                  Abdomen:  abdomen soft, non-tender, BS normoactive, no mass, no HSM, no bruits        Vascular:                                        Extremities and Back:  no deformities, clubbing, cyanosis, erythema observed        Neurological:  no gross motor deficits                 Medications:     Current Outpatient Prescriptions   Medication Sig Dispense Refill     levothyroxine (SYNTHROID/LEVOTHROID) 125 MCG tablet TAKE 1 TABLET BY MOUTH ONCE DAILY 90 tablet 0     SUMAtriptan (IMITREX) 50 MG tablet Take 50 mg by mouth as needed       VITAMIN D, CHOLECALCIFEROL, PO Take 1,000 Units by mouth daily             Family History   Problem Relation Age of Onset     DIABETES Maternal Grandmother      Alcohol/Drug Maternal Grandmother      GASTROINTESTINAL DISEASE Paternal Grandmother      IBS     Hypertension Paternal Grandmother      CEREBROVASCULAR DISEASE Paternal Grandmother      Alzheimer Disease " Paternal Grandmother      Hypertension Father      Arthritis Father      HEART DISEASE Father      cardimyopthy age 60, arrythmias     Lipids Father      Neurologic Disorder Sister      brain tumor     GASTROINTESTINAL DISEASE Mother      celiac     C.A.D. No family hx of      Breast Cancer No family hx of      Cancer - colorectal No family hx of        Social History     Social History     Marital status:      Spouse name: N/A     Number of children: 2     Years of education: N/A     Occupational History      [employer]     Social History Main Topics     Smoking status: Never Smoker     Smokeless tobacco: Never Used     Alcohol use Yes      Comment: rare     Drug use: No     Sexual activity: Yes     Partners: Male     Birth control/ protection: Male Surgical      Comment: Vasectomy     Other Topics Concern     Parent/Sibling W/ Cabg, Mi Or Angioplasty Before 65f 55m? No     Exercise Yes     ride horses     Social History Narrative            Past Medical History:     Past Medical History:   Diagnosis Date     Anemia, unspecified     with pregnancy 6/06     Dysmenorrhea 2/1/2012     GERD (gastroesophageal reflux disease) 1/30/2014     Papanicolaou smear of cervix with low grade squamous intraepithelial lesion (LGSIL) 7/09    Colpo negative     Right hip labral tear 8/24/2006     Unspecified closed fracture of ankle     Left foot     Unspecified disorder of thyroid 10/20/2002    Thyroid disease, goiter nodules, S/p thyroidectomy 2002              Past Surgical History:     Past Surgical History:   Procedure Laterality Date     C HIP ARTHROSCOPY, DX  6/08    (R) Anterior superior labral debridement.      ESOPHAGOSCOPY, GASTROSCOPY, DUODENOSCOPY (EGD), COMBINED  11/1/2010    EGD     THYROIDECTOMY   2002    S/p thyroidectomy 2002              Allergies:   Gluten; Macrobid [nitrofuran derivatives]; and Zithromax [azithromycin dihydrate]       Data:   All laboratory data reviewed:    LAST  CHOLESTEROL:  Lab Results   Component Value Date    CHOL 179 07/18/2013     Lab Results   Component Value Date    HDL 37 07/18/2013     Lab Results   Component Value Date     07/18/2013     Lab Results   Component Value Date    TRIG 133 07/18/2013     Lab Results   Component Value Date    CHOLHDLRATIO 4.8 07/18/2013       LAST BMP:  Lab Results   Component Value Date     09/26/2017      Lab Results   Component Value Date    POTASSIUM 4.1 09/26/2017     Lab Results   Component Value Date    CHLORIDE 105 09/26/2017     Lab Results   Component Value Date    LILIAN 8.7 09/26/2017     Lab Results   Component Value Date    CO2 27 09/26/2017     Lab Results   Component Value Date    BUN 12 09/26/2017     Lab Results   Component Value Date    CR 0.79 09/26/2017     Lab Results   Component Value Date    GLC 86 09/26/2017       LAST CBC:  Lab Results   Component Value Date    WBC 4.5 02/14/2018     Lab Results   Component Value Date    RBC 5.16 02/14/2018     Lab Results   Component Value Date    HGB 15.3 02/14/2018     Lab Results   Component Value Date    HCT 43.8 02/14/2018     Lab Results   Component Value Date    MCV 85 02/14/2018     Lab Results   Component Value Date    MCH 29.7 02/14/2018     Lab Results   Component Value Date    MCHC 34.9 02/14/2018     Lab Results   Component Value Date    RDW 12.3 02/14/2018     Lab Results   Component Value Date     02/14/2018

## 2018-10-08 DIAGNOSIS — E89.0 POSTOPERATIVE HYPOTHYROIDISM: ICD-10-CM

## 2018-10-08 NOTE — TELEPHONE ENCOUNTER
"Requested Prescriptions   Pending Prescriptions Disp Refills     levothyroxine (SYNTHROID/LEVOTHROID) 125 MCG tablet [Pharmacy Med Name: LEVOTHYROXIN 125MCG TAB] 90 tablet 0     Sig: TAKE 1 TABLET BY MOUTH ONCE DAILY    Thyroid Protocol Failed    10/8/2018  2:52 PM       Failed - Recent (12 mo) or future (30 days) visit within the authorizing provider's specialty    Patient had office visit in the last 12 months or has a visit in the next 30 days with authorizing provider or within the authorizing provider's specialty.  See \"Patient Info\" tab in inbasket, or \"Choose Columns\" in Meds & Orders section of the refill encounter.           Failed - Normal TSH on file in past 12 months    Recent Labs   Lab Test  06/02/18   1200   TSH  4.20*             Passed - Patient is 12 years or older       Passed - No active pregnancy on record    If patient is pregnant or has had a positive pregnancy test, please check TSH.         Passed - No positive pregnancy test in past 12 months    If patient is pregnant or has had a positive pregnancy test, please check TSH.          Routing refill request to provider for review/approval because:  Labs out of range:  TSH        "
Discharged

## 2018-10-09 RX ORDER — LEVOTHYROXINE SODIUM 125 UG/1
TABLET ORAL
Qty: 90 TABLET | Refills: 0 | Status: SHIPPED | OUTPATIENT
Start: 2018-10-09 | End: 2019-01-08

## 2018-12-27 ENCOUNTER — OFFICE VISIT (OUTPATIENT)
Dept: PODIATRY | Facility: CLINIC | Age: 38
End: 2018-12-27
Payer: COMMERCIAL

## 2018-12-27 ENCOUNTER — ANCILLARY PROCEDURE (OUTPATIENT)
Dept: GENERAL RADIOLOGY | Facility: CLINIC | Age: 38
End: 2018-12-27
Attending: PODIATRIST
Payer: COMMERCIAL

## 2018-12-27 VITALS
DIASTOLIC BLOOD PRESSURE: 79 MMHG | HEIGHT: 64 IN | WEIGHT: 138 LBS | BODY MASS INDEX: 23.56 KG/M2 | HEART RATE: 83 BPM | SYSTOLIC BLOOD PRESSURE: 115 MMHG

## 2018-12-27 DIAGNOSIS — M79.672 HEEL PAIN, BILATERAL: Primary | ICD-10-CM

## 2018-12-27 DIAGNOSIS — M79.672 HEEL PAIN, BILATERAL: ICD-10-CM

## 2018-12-27 DIAGNOSIS — M77.30 CALCANEAL SPUR, UNSPECIFIED LATERALITY: ICD-10-CM

## 2018-12-27 DIAGNOSIS — M79.671 HEEL PAIN, BILATERAL: Primary | ICD-10-CM

## 2018-12-27 DIAGNOSIS — M79.671 HEEL PAIN, BILATERAL: ICD-10-CM

## 2018-12-27 PROCEDURE — 99203 OFFICE O/P NEW LOW 30 MIN: CPT | Performed by: PODIATRIST

## 2018-12-27 PROCEDURE — 73630 X-RAY EXAM OF FOOT: CPT | Mod: RT

## 2018-12-27 ASSESSMENT — MIFFLIN-ST. JEOR: SCORE: 1283.02

## 2018-12-27 NOTE — NURSING NOTE
"Chief Complaint   Patient presents with     Consult     BL bumps on heels       Initial /79 (BP Location: Left arm, Patient Position: Sitting, Cuff Size: Adult Regular)   Pulse 83   Ht 1.613 m (5' 3.5\")   Wt 62.6 kg (138 lb)   BMI 24.06 kg/m   Estimated body mass index is 24.06 kg/m  as calculated from the following:    Height as of this encounter: 1.613 m (5' 3.5\").    Weight as of this encounter: 62.6 kg (138 lb).  Medications and allergies reviewed.      Rosa ONEILL MA    "

## 2018-12-27 NOTE — LETTER
12/27/2018         RE: Kimberley Choe  [address] [address]        Dear Colleague,    Thank you for referring your patient, Kimberley Choe, to the [facility]. Please see a copy of my visit note below.    PATIENT HISTORY:  Kimberley Choe is a 38 year old female who presents to clinic for a painful right and left foot .  The patient describes the pain as sharp stabbing.  The patient relates the pain level is moderate.  The patient relates pain is located in the back of the right and left heels.  The patient relates the pain has been present for the past several weeks to months.  The patient relates pain with ambulation.  The patient has tried different shoes with little relief.       REVIEW OF SYSTEMS:  Constitutional, HEENT, cardiovascular, pulmonary, GI, , musculoskeletal, neuro, skin, endocrine and psych systems are negative, except as otherwise noted.     PAST MEDICAL HISTORY:   Past Medical History:   Diagnosis Date     Anemia, unspecified     with pregnancy 6/06     Dysmenorrhea 2/1/2012     GERD (gastroesophageal reflux disease) 1/30/2014     Papanicolaou smear of cervix with low grade squamous intraepithelial lesion (LGSIL) 7/09    Colpo negative     Right hip labral tear 8/24/2006     Unspecified closed fracture of ankle     Left foot     Unspecified disorder of thyroid 10/20/2002    Thyroid disease, goiter nodules, S/p thyroidectomy 2002        PAST SURGICAL HISTORY:   Past Surgical History:   Procedure Laterality Date     C HIP ARTHROSCOPY, DX  6/08    (R) Anterior superior labral debridement.      ESOPHAGOSCOPY, GASTROSCOPY, DUODENOSCOPY (EGD), COMBINED  11/1/2010    EGD     THYROIDECTOMY   2002    S/p thyroidectomy 2002        MEDICATIONS:   Current Outpatient Medications:      levothyroxine (SYNTHROID/LEVOTHROID) 125 MCG tablet, TAKE 1 TABLET BY MOUTH ONCE DAILY, Disp: 90 tablet, Rfl: 0     VITAMIN D, CHOLECALCIFEROL, PO, Take 1,000 Units by  mouth daily, Disp: , Rfl:      SUMAtriptan (IMITREX) 50 MG tablet, Take 50 mg by mouth as needed, Disp: , Rfl:      ALLERGIES:    Allergies   Allergen Reactions     Gluten Other (See Comments)     Celiac disease     Macrobid [Nitrofuran Derivatives] Nausea and Vomiting     Zithromax [Azithromycin Dihydrate] Nausea and Vomiting        SOCIAL HISTORY:   Social History     Socioeconomic History     Marital status:      Spouse name: Not on file     Number of children: 2     Years of education: Not on file     Highest education level: Not on file   Social Needs     Financial resource strain: Not on file     Food insecurity - worry: Not on file     Food insecurity - inability: Not on file     Transportation needs - medical: Not on file     Transportation needs - non-medical: Not on file   Occupational History     Occupation:      Employer: [employer]   Tobacco Use     Smoking status: Never Smoker     Smokeless tobacco: Never Used   Substance and Sexual Activity     Alcohol use: Yes     Comment: rare     Drug use: No     Sexual activity: Yes     Partners: Male     Birth control/protection: Male Surgical     Comment: Vasectomy   Other Topics Concern     Parent/sibling w/ CABG, MI or angioplasty before 65F 55M? No      Service Not Asked     Blood Transfusions Not Asked     Caffeine Concern Not Asked     Occupational Exposure Not Asked     Hobby Hazards Not Asked     Sleep Concern Not Asked     Stress Concern Not Asked     Weight Concern Not Asked     Special Diet Not Asked     Back Care Not Asked     Exercise Yes     Comment: ride horses     Bike Helmet Not Asked     Seat Belt Not Asked     Self-Exams Not Asked   Social History Narrative     Not on file        FAMILY HISTORY:   Family History   Problem Relation Age of Onset     Diabetes Maternal Grandmother      Alcohol/Drug Maternal Grandmother      Gastrointestinal Disease Paternal Grandmother         IBS     Hypertension Paternal  "Grandmother      Cerebrovascular Disease Paternal Grandmother      Alzheimer Disease Paternal Grandmother      Hypertension Father      Arthritis Father      Heart Disease Father         cardimyopthy age 60, arrythmias     Lipids Father      Neurologic Disorder Sister         brain tumor     Gastrointestinal Disease Mother         celiac     C.A.D. No family hx of      Breast Cancer No family hx of      Cancer - colorectal No family hx of         EXAM:Vitals: /79 (BP Location: Left arm, Patient Position: Sitting, Cuff Size: Adult Regular)   Pulse 83   Ht 1.613 m (5' 3.5\")   Wt 62.6 kg (138 lb)   BMI 24.06 kg/m     BMI= Body mass index is 24.06 kg/m .        General appearance: Patient is alert and fully cooperative with history & exam.  No sign of distress is noted during the visit.     Psychiatric: Affect is pleasant & appropriate.  Patient appears motivated to improve health.     Respiratory: Breathing is regular & unlabored while sitting.     HEENT: Hearing is intact to spoken word.  Speech is clear.  No gross evidence of visual impairment that would impact ambulation.     Dermatologic: Skin is intact to both lower extremities without significant lesions, rash or abrasion.  No paronychia or evidence of soft tissue infection is noted.     Vascular: DP & PT pulses are intact & regular bilaterally.  No significant edema or varicosities noted.  CFT and skin temperature is normal to both lower extremities.     Neurologic: Lower extremity sensation is intact to light touch.  No evidence of weakness or contracture in the lower extremities.  No evidence of neuropathy.     Musculoskeletal: Patient is ambulatory without assistive device or brace.  No gross ankle deformity noted.  No foot or ankle joint effusion is noted.  Noted pain on palpation of the posterior aspect of the right and left heel at the insertion point of the Achilles tendon.  One notes no surrounding erythema noted.  One notes a tight gastroc " complex bilaterally.    Radiographs were evaluated including AP, lateral and medial oblique views of the right and left foot reveals posterior calcaneal spurs with no cortical erosions or periosteal elevation.  All joint margins appear stable.  There is no apparent fracture or tumor formation noted.  There is no evidence of foreign body.    ASSESSMENT / PLAN:     ICD-10-CM    1. Heel pain, bilateral M79.671 XR Foot Bilateral G/E 3 Views    M79.672        I have explained to Kimberley  about the conditions.  We discussed the nature of the condition as well as the treatment plan and expected length of recovery.  At this time, the patient was given the    Quarter inch heel lift to better offload the tension forces at the insertion point of the Achilles tendon.  The patient will modify her shoe wear.  If this does not help the patient may require surgical excision of calcaneal spurs.      Disclaimer: This note consists of symbols derived from keyboarding, dictation and/or voice recognition software. As a result, there may be errors in the script that have gone undetected. Please consider this when interpreting information found in this chart.       NINO Verma.P.JESUS., F.A.C.F.A.S.      Again, thank you for allowing me to participate in the care of your patient.        Sincerely,        Michael Graves DPM

## 2018-12-27 NOTE — PROGRESS NOTES
PATIENT HISTORY:  Kimberley Choe is a 38 year old female who presents to clinic for a painful right and left foot .  The patient describes the pain as sharp stabbing.  The patient relates the pain level is moderate.  The patient relates pain is located in the back of the right and left heels.  The patient relates the pain has been present for the past several weeks to months.  The patient relates pain with ambulation.  The patient has tried different shoes with little relief.       REVIEW OF SYSTEMS:  Constitutional, HEENT, cardiovascular, pulmonary, GI, , musculoskeletal, neuro, skin, endocrine and psych systems are negative, except as otherwise noted.     PAST MEDICAL HISTORY:   Past Medical History:   Diagnosis Date     Anemia, unspecified     with pregnancy 6/06     Dysmenorrhea 2/1/2012     GERD (gastroesophageal reflux disease) 1/30/2014     Papanicolaou smear of cervix with low grade squamous intraepithelial lesion (LGSIL) 7/09    Colpo negative     Right hip labral tear 8/24/2006     Unspecified closed fracture of ankle     Left foot     Unspecified disorder of thyroid 10/20/2002    Thyroid disease, goiter nodules, S/p thyroidectomy 2002        PAST SURGICAL HISTORY:   Past Surgical History:   Procedure Laterality Date     C HIP ARTHROSCOPY, DX  6/08    (R) Anterior superior labral debridement.      ESOPHAGOSCOPY, GASTROSCOPY, DUODENOSCOPY (EGD), COMBINED  11/1/2010    EGD     THYROIDECTOMY   2002    S/p thyroidectomy 2002        MEDICATIONS:   Current Outpatient Medications:      levothyroxine (SYNTHROID/LEVOTHROID) 125 MCG tablet, TAKE 1 TABLET BY MOUTH ONCE DAILY, Disp: 90 tablet, Rfl: 0     VITAMIN D, CHOLECALCIFEROL, PO, Take 1,000 Units by mouth daily, Disp: , Rfl:      SUMAtriptan (IMITREX) 50 MG tablet, Take 50 mg by mouth as needed, Disp: , Rfl:      ALLERGIES:    Allergies   Allergen Reactions     Gluten Other (See Comments)     Celiac disease     Macrobid [Nitrofuran Derivatives] Nausea and  Vomiting     Zithromax [Azithromycin Dihydrate] Nausea and Vomiting        SOCIAL HISTORY:   Social History     Socioeconomic History     Marital status:      Spouse name: Not on file     Number of children: 2     Years of education: Not on file     Highest education level: Not on file   Social Needs     Financial resource strain: Not on file     Food insecurity - worry: Not on file     Food insecurity - inability: Not on file     Transportation needs - medical: Not on file     Transportation needs - non-medical: Not on file   Occupational History     Occupation:      Employer: [employer]   Tobacco Use     Smoking status: Never Smoker     Smokeless tobacco: Never Used   Substance and Sexual Activity     Alcohol use: Yes     Comment: rare     Drug use: No     Sexual activity: Yes     Partners: Male     Birth control/protection: Male Surgical     Comment: Vasectomy   Other Topics Concern     Parent/sibling w/ CABG, MI or angioplasty before 65F 55M? No      Service Not Asked     Blood Transfusions Not Asked     Caffeine Concern Not Asked     Occupational Exposure Not Asked     Hobby Hazards Not Asked     Sleep Concern Not Asked     Stress Concern Not Asked     Weight Concern Not Asked     Special Diet Not Asked     Back Care Not Asked     Exercise Yes     Comment: ride horses     Bike Helmet Not Asked     Seat Belt Not Asked     Self-Exams Not Asked   Social History Narrative     Not on file        FAMILY HISTORY:   Family History   Problem Relation Age of Onset     Diabetes Maternal Grandmother      Alcohol/Drug Maternal Grandmother      Gastrointestinal Disease Paternal Grandmother         IBS     Hypertension Paternal Grandmother      Cerebrovascular Disease Paternal Grandmother      Alzheimer Disease Paternal Grandmother      Hypertension Father      Arthritis Father      Heart Disease Father         cardimyopthy age 60, arrythmias     Lipids Father      Neurologic Disorder Sister   "       brain tumor     Gastrointestinal Disease Mother         celiac     C.A.D. No family hx of      Breast Cancer No family hx of      Cancer - colorectal No family hx of         EXAM:Vitals: /79 (BP Location: Left arm, Patient Position: Sitting, Cuff Size: Adult Regular)   Pulse 83   Ht 1.613 m (5' 3.5\")   Wt 62.6 kg (138 lb)   BMI 24.06 kg/m    BMI= Body mass index is 24.06 kg/m .        General appearance: Patient is alert and fully cooperative with history & exam.  No sign of distress is noted during the visit.     Psychiatric: Affect is pleasant & appropriate.  Patient appears motivated to improve health.     Respiratory: Breathing is regular & unlabored while sitting.     HEENT: Hearing is intact to spoken word.  Speech is clear.  No gross evidence of visual impairment that would impact ambulation.     Dermatologic: Skin is intact to both lower extremities without significant lesions, rash or abrasion.  No paronychia or evidence of soft tissue infection is noted.     Vascular: DP & PT pulses are intact & regular bilaterally.  No significant edema or varicosities noted.  CFT and skin temperature is normal to both lower extremities.     Neurologic: Lower extremity sensation is intact to light touch.  No evidence of weakness or contracture in the lower extremities.  No evidence of neuropathy.     Musculoskeletal: Patient is ambulatory without assistive device or brace.  No gross ankle deformity noted.  No foot or ankle joint effusion is noted.  Noted pain on palpation of the posterior aspect of the right and left heel at the insertion point of the Achilles tendon.  One notes no surrounding erythema noted.  One notes a tight gastroc complex bilaterally.    Radiographs were evaluated including AP, lateral and medial oblique views of the right and left foot reveals posterior calcaneal spurs with no cortical erosions or periosteal elevation.  All joint margins appear stable.  There is no apparent fracture " or tumor formation noted.  There is no evidence of foreign body.    ASSESSMENT / PLAN:     ICD-10-CM    1. Heel pain, bilateral M79.671 XR Foot Bilateral G/E 3 Views    M79.672        I have explained to Kimberley  about the conditions.  We discussed the nature of the condition as well as the treatment plan and expected length of recovery.  At this time, the patient was given the    Quarter inch heel lift to better offload the tension forces at the insertion point of the Achilles tendon.  The patient will modify her shoe wear.  If this does not help the patient may require surgical excision of calcaneal spurs.      Disclaimer: This note consists of symbols derived from keyboarding, dictation and/or voice recognition software. As a result, there may be errors in the script that have gone undetected. Please consider this when interpreting information found in this chart.       HIPOLITO Graves D.P.M., F.A.C.F.A.S.

## 2019-01-07 NOTE — PROGRESS NOTES
SUBJECTIVE:                                                    Kimberley Choe is 38 year old female   Chief Complaint   Patient presents with     Thyroid Problem     Med refills. Not fasting     Hypothyroidism Follow-up      Since last visit, patient describes the following symptoms: fatigue, wondering if dose should be increased            Problem list and histories reviewed & adjusted, as indicated.  Additional history:   Postoperative hypothyroidism  Fatigue, unspecified type, is better but not sure thyroid level is ok.  Fatty liver disease, nonalcoholic, needs labs rechecked  Iron deficiency anemia, unspecified iron deficiency anemia type, has had 2 iron infusions, sees hematology but needs labs      Patient Active Problem List   Diagnosis     Hypothyroidism     Undiagnosed cardiac murmurs     Anemia     Hip pain     CARDIOVASCULAR SCREENING; LDL GOAL LESS THAN 160     Celiac disease     Joint pains     Fatty liver disease, nonalcoholic     Family history of congenital heart disease     Vitamin D deficiency     Iron deficiency anemia, unspecified iron deficiency anemia type     Rapid heart beat     Past Surgical History:   Procedure Laterality Date     C HIP ARTHROSCOPY, DX  6/08    (R) Anterior superior labral debridement.      ESOPHAGOSCOPY, GASTROSCOPY, DUODENOSCOPY (EGD), COMBINED  11/1/2010    EGD     THYROIDECTOMY   2002    S/p thyroidectomy 2002       Social History     Tobacco Use     Smoking status: Never Smoker     Smokeless tobacco: Never Used   Substance Use Topics     Alcohol use: Yes     Comment: rare     Family History   Problem Relation Age of Onset     Diabetes Maternal Grandmother      Alcohol/Drug Maternal Grandmother      Gastrointestinal Disease Paternal Grandmother         IBS     Hypertension Paternal Grandmother      Cerebrovascular Disease Paternal Grandmother      Alzheimer Disease Paternal Grandmother      Hypertension Father      Arthritis Father      Heart Disease Father          cardimyopthy age 60, arrythmias     Lipids Father      Neurologic Disorder Sister         brain tumor     Gastrointestinal Disease Mother         celiac     C.A.D. No family hx of      Breast Cancer No family hx of      Cancer - colorectal No family hx of          Current Outpatient Medications   Medication Sig Dispense Refill     levothyroxine (SYNTHROID/LEVOTHROID) 125 MCG tablet Take 1 tablet (125 mcg) by mouth daily 90 tablet 3     VITAMIN D, CHOLECALCIFEROL, PO Take 1,000 Units by mouth daily       order for DME Equipment being ordered: quarter inch heel lift 2 Units 0     SUMAtriptan (IMITREX) 50 MG tablet Take 50 mg by mouth as needed       Allergies   Allergen Reactions     Gluten Other (See Comments)     Celiac disease     Macrobid [Nitrofuran Derivatives] Nausea and Vomiting     Zithromax [Azithromycin Dihydrate] Nausea and Vomiting     Recent Labs   Lab Test 06/02/18  1200 09/26/17  1029 05/18/17  1328  04/04/17  1200  07/18/13  0957  04/26/12  0910   LDL  --   --   --   --   --   --  115  --  90   HDL  --   --   --   --   --   --  37*  --  38*   TRIG  --   --   --   --   --   --  133  --  120   ALT  --  21 30  --  43   < > 197*   < >  --    CR  --  0.79 0.68   < > 0.70   < >  --    < >  --    GFRESTIMATED  --  82 >90  Non  GFR Calc     < > >90  Non  GFR Calc     < >  --    < >  --    GFRESTBLACK  --  >90 >90  African American GFR Calc     < > >90   GFR Calc     < >  --    < >  --    POTASSIUM  --  4.1 3.6   < > 4.2   < >  --    < >  --    TSH 4.20*  --  1.14  --  8.43*   < >  --    < > 0.02*    < > = values in this interval not displayed.      BP Readings from Last 3 Encounters:   01/08/19 116/72   12/27/18 115/79   06/12/18 112/74    Wt Readings from Last 3 Encounters:   01/08/19 65.1 kg (143 lb 9.6 oz)   12/27/18 62.6 kg (138 lb)   06/12/18 62.6 kg (138 lb)         ROS:  Constitutional, HEENT, cardiovascular, pulmonary, gi and gu systems are negative,  "except as otherwise noted.    OBJECTIVE:                                                    /72   Pulse 82   Temp 98  F (36.7  C) (Tympanic)   Resp 14   Ht 1.626 m (5' 4\")   Wt 65.1 kg (143 lb 9.6 oz)   SpO2 98%   BMI 24.65 kg/m     GENERAL APPEARANCE ADULT: Alert, no acute distress  HENT: Ears and TMs normal, oral mucosa and posterior oropharynx normal  NECK: no adenopathy, thyroid enlargement  RESP: lungs clear to auscultation   CV: normal rate, regular rhythm, no murmur or gallop  Diagnostic Test Results:  Results for orders placed or performed in visit on 01/08/19   CBC with platelets differential   Result Value Ref Range    WBC 5.9 4.0 - 11.0 10e9/L    RBC Count 4.92 3.8 - 5.2 10e12/L    Hemoglobin 14.4 11.7 - 15.7 g/dL    Hematocrit 41.5 35.0 - 47.0 %    MCV 84 78 - 100 fl    MCH 29.3 26.5 - 33.0 pg    MCHC 34.7 31.5 - 36.5 g/dL    RDW 12.2 10.0 - 15.0 %    Platelet Count 201 150 - 450 10e9/L    % Neutrophils 68.1 %    % Lymphocytes 20.3 %    % Monocytes 10.3 %    % Eosinophils 1.0 %    % Basophils 0.3 %    Absolute Neutrophil 4.0 1.6 - 8.3 10e9/L    Absolute Lymphocytes 1.2 0.8 - 5.3 10e9/L    Absolute Monocytes 0.6 0.0 - 1.3 10e9/L    Absolute Eosinophils 0.1 0.0 - 0.7 10e9/L    Absolute Basophils 0.0 0.0 - 0.2 10e9/L    Diff Method Automated Method           ASSESSMENT/PLAN:                                                    1. Postoperative hypothyroidism  due for labs and refill, taking medication without difficulty  - levothyroxine (SYNTHROID/LEVOTHROID) 125 MCG tablet; Take 1 tablet (125 mcg) by mouth daily  Dispense: 90 tablet; Refill: 3  - TSH  - T4 FREE    2. Fatigue, unspecified type  - Comprehensive metabolic panel  - CBC with platelets differential    3. Fatty liver disease, nonalcoholic  - Comprehensive metabolic panel  - GGT  - Bilirubin direct    4. Iron deficiency anemia, unspecified iron deficiency anemia type  Hgb is normal today.  Thinks gets anemic with flares of sprue  - CBC " with platelets differential    Kirsten Randall MD  [facility]

## 2019-01-08 ENCOUNTER — OFFICE VISIT (OUTPATIENT)
Dept: FAMILY MEDICINE | Facility: CLINIC | Age: 39
End: 2019-01-08
Payer: COMMERCIAL

## 2019-01-08 VITALS
TEMPERATURE: 98 F | HEART RATE: 82 BPM | DIASTOLIC BLOOD PRESSURE: 72 MMHG | RESPIRATION RATE: 14 BRPM | BODY MASS INDEX: 24.52 KG/M2 | HEIGHT: 64 IN | WEIGHT: 143.6 LBS | SYSTOLIC BLOOD PRESSURE: 116 MMHG | OXYGEN SATURATION: 98 %

## 2019-01-08 DIAGNOSIS — K76.0 FATTY LIVER DISEASE, NONALCOHOLIC: ICD-10-CM

## 2019-01-08 DIAGNOSIS — E89.0 POSTOPERATIVE HYPOTHYROIDISM: ICD-10-CM

## 2019-01-08 DIAGNOSIS — R53.83 FATIGUE, UNSPECIFIED TYPE: Primary | ICD-10-CM

## 2019-01-08 DIAGNOSIS — D50.9 IRON DEFICIENCY ANEMIA, UNSPECIFIED IRON DEFICIENCY ANEMIA TYPE: ICD-10-CM

## 2019-01-08 LAB
ALBUMIN SERPL-MCNC: 4.1 G/DL (ref 3.4–5)
ALP SERPL-CCNC: 58 U/L (ref 40–150)
ALT SERPL W P-5'-P-CCNC: 40 U/L (ref 0–50)
ANION GAP SERPL CALCULATED.3IONS-SCNC: 7 MMOL/L (ref 3–14)
AST SERPL W P-5'-P-CCNC: 21 U/L (ref 0–45)
BASOPHILS # BLD AUTO: 0 10E9/L (ref 0–0.2)
BASOPHILS NFR BLD AUTO: 0.3 %
BILIRUB DIRECT SERPL-MCNC: 0.3 MG/DL (ref 0–0.2)
BILIRUB SERPL-MCNC: 1.4 MG/DL (ref 0.2–1.3)
BUN SERPL-MCNC: 13 MG/DL (ref 7–30)
CALCIUM SERPL-MCNC: 8.6 MG/DL (ref 8.5–10.1)
CHLORIDE SERPL-SCNC: 103 MMOL/L (ref 94–109)
CO2 SERPL-SCNC: 28 MMOL/L (ref 20–32)
CREAT SERPL-MCNC: 0.68 MG/DL (ref 0.52–1.04)
DIFFERENTIAL METHOD BLD: NORMAL
EOSINOPHIL # BLD AUTO: 0.1 10E9/L (ref 0–0.7)
EOSINOPHIL NFR BLD AUTO: 1 %
ERYTHROCYTE [DISTWIDTH] IN BLOOD BY AUTOMATED COUNT: 12.2 % (ref 10–15)
GFR SERPL CREATININE-BSD FRML MDRD: >90 ML/MIN/{1.73_M2}
GGT SERPL-CCNC: 20 U/L (ref 0–40)
GLUCOSE SERPL-MCNC: 81 MG/DL (ref 70–99)
HCT VFR BLD AUTO: 41.5 % (ref 35–47)
HGB BLD-MCNC: 14.4 G/DL (ref 11.7–15.7)
LYMPHOCYTES # BLD AUTO: 1.2 10E9/L (ref 0.8–5.3)
LYMPHOCYTES NFR BLD AUTO: 20.3 %
MCH RBC QN AUTO: 29.3 PG (ref 26.5–33)
MCHC RBC AUTO-ENTMCNC: 34.7 G/DL (ref 31.5–36.5)
MCV RBC AUTO: 84 FL (ref 78–100)
MONOCYTES # BLD AUTO: 0.6 10E9/L (ref 0–1.3)
MONOCYTES NFR BLD AUTO: 10.3 %
NEUTROPHILS # BLD AUTO: 4 10E9/L (ref 1.6–8.3)
NEUTROPHILS NFR BLD AUTO: 68.1 %
PLATELET # BLD AUTO: 201 10E9/L (ref 150–450)
POTASSIUM SERPL-SCNC: 3.7 MMOL/L (ref 3.4–5.3)
PROT SERPL-MCNC: 7.3 G/DL (ref 6.8–8.8)
RBC # BLD AUTO: 4.92 10E12/L (ref 3.8–5.2)
SODIUM SERPL-SCNC: 138 MMOL/L (ref 133–144)
T4 FREE SERPL-MCNC: 1.69 NG/DL (ref 0.76–1.46)
TSH SERPL DL<=0.005 MIU/L-ACNC: 0.17 MU/L (ref 0.4–4)
WBC # BLD AUTO: 5.9 10E9/L (ref 4–11)

## 2019-01-08 PROCEDURE — 82977 ASSAY OF GGT: CPT | Performed by: FAMILY MEDICINE

## 2019-01-08 PROCEDURE — 84443 ASSAY THYROID STIM HORMONE: CPT | Performed by: FAMILY MEDICINE

## 2019-01-08 PROCEDURE — 82248 BILIRUBIN DIRECT: CPT | Performed by: FAMILY MEDICINE

## 2019-01-08 PROCEDURE — 36415 COLL VENOUS BLD VENIPUNCTURE: CPT | Performed by: FAMILY MEDICINE

## 2019-01-08 PROCEDURE — 85025 COMPLETE CBC W/AUTO DIFF WBC: CPT | Performed by: FAMILY MEDICINE

## 2019-01-08 PROCEDURE — 99214 OFFICE O/P EST MOD 30 MIN: CPT | Performed by: FAMILY MEDICINE

## 2019-01-08 PROCEDURE — 80053 COMPREHEN METABOLIC PANEL: CPT | Performed by: FAMILY MEDICINE

## 2019-01-08 PROCEDURE — 84439 ASSAY OF FREE THYROXINE: CPT | Performed by: FAMILY MEDICINE

## 2019-01-08 RX ORDER — LEVOTHYROXINE SODIUM 125 UG/1
125 TABLET ORAL DAILY
Qty: 90 TABLET | Refills: 3 | Status: SHIPPED | OUTPATIENT
Start: 2019-01-08 | End: 2019-03-11

## 2019-01-08 ASSESSMENT — MIFFLIN-ST. JEOR: SCORE: 1316.37

## 2019-01-08 NOTE — NURSING NOTE
"Initial /72   Pulse 82   Temp 98  F (36.7  C) (Tympanic)   Resp 14   Ht 1.626 m (5' 4\")   Wt 65.1 kg (143 lb 9.6 oz)   SpO2 98%   BMI 24.65 kg/m   Estimated body mass index is 24.65 kg/m  as calculated from the following:    Height as of this encounter: 1.626 m (5' 4\").    Weight as of this encounter: 65.1 kg (143 lb 9.6 oz). .      "

## 2019-01-10 ENCOUNTER — MYC MEDICAL ADVICE (OUTPATIENT)
Dept: FAMILY MEDICINE | Facility: CLINIC | Age: 39
End: 2019-01-10

## 2019-01-10 RX ORDER — LEVOTHYROXINE SODIUM 112 UG/1
112 TABLET ORAL DAILY
Qty: 90 TABLET | Refills: 3 | Status: SHIPPED | OUTPATIENT
Start: 2019-01-10 | End: 2020-03-16 | Stop reason: DRUGHIGH

## 2019-01-16 ENCOUNTER — OFFICE VISIT (OUTPATIENT)
Dept: FAMILY MEDICINE | Facility: CLINIC | Age: 39
End: 2019-01-16
Payer: COMMERCIAL

## 2019-01-16 VITALS
OXYGEN SATURATION: 99 % | HEIGHT: 64 IN | DIASTOLIC BLOOD PRESSURE: 78 MMHG | TEMPERATURE: 98.6 F | SYSTOLIC BLOOD PRESSURE: 123 MMHG | BODY MASS INDEX: 24.17 KG/M2 | WEIGHT: 141.6 LBS | HEART RATE: 79 BPM

## 2019-01-16 DIAGNOSIS — E03.9 ACQUIRED HYPOTHYROIDISM: ICD-10-CM

## 2019-01-16 DIAGNOSIS — Z86.2 HISTORY OF ANEMIA: ICD-10-CM

## 2019-01-16 DIAGNOSIS — K76.0 FATTY LIVER: Primary | ICD-10-CM

## 2019-01-16 LAB
ALBUMIN SERPL-MCNC: 4.4 G/DL (ref 3.4–5)
ALP SERPL-CCNC: 51 U/L (ref 40–150)
ALT SERPL W P-5'-P-CCNC: 32 U/L (ref 0–50)
AST SERPL W P-5'-P-CCNC: 17 U/L (ref 0–45)
BILIRUB DIRECT SERPL-MCNC: 0.2 MG/DL (ref 0–0.2)
BILIRUB SERPL-MCNC: 1.2 MG/DL (ref 0.2–1.3)
FERRITIN SERPL-MCNC: 99 NG/ML (ref 12–150)
PROT SERPL-MCNC: 7.6 G/DL (ref 6.8–8.8)

## 2019-01-16 PROCEDURE — 82728 ASSAY OF FERRITIN: CPT | Performed by: FAMILY MEDICINE

## 2019-01-16 PROCEDURE — 80076 HEPATIC FUNCTION PANEL: CPT | Performed by: FAMILY MEDICINE

## 2019-01-16 PROCEDURE — 36415 COLL VENOUS BLD VENIPUNCTURE: CPT | Performed by: FAMILY MEDICINE

## 2019-01-16 PROCEDURE — 99213 OFFICE O/P EST LOW 20 MIN: CPT | Performed by: FAMILY MEDICINE

## 2019-01-16 ASSESSMENT — MIFFLIN-ST. JEOR: SCORE: 1307.29

## 2019-01-16 NOTE — PROGRESS NOTES
SUBJECTIVE:   Kmiberley Choe is a 38 year old female who presents to clinic today for the following health issues:      Follow Up- Labs  Would like to discuss previous labs that were done 1/8/19.   Was told due to abnormal results -elevated Bilirubin patient would need surgery but she is confused about why she would need surgery and what kind.    Hx of fatty liver wondering if she should repeat labs, or complete US.   Would like to discuss this further.     Denies having any symptoms at this time. No jaundice, abdominal pain, fever or chills.     Requesting a lab test- ferritin level- states that she has had a history of anemia in the past, needing a blood transfusion and would like to recheck her labs.   Recent CBC was normal.     HEALTH CARE MAINTENANCE: Due for a physical with a Pap    Problem list and histories reviewed & adjusted, as indicated.  Additional history: as documented    Patient Active Problem List   Diagnosis     Hypothyroidism     Undiagnosed cardiac murmurs     Anemia     Hip pain     CARDIOVASCULAR SCREENING; LDL GOAL LESS THAN 160     Celiac disease     Joint pains     Fatty liver disease, nonalcoholic     Family history of congenital heart disease     Vitamin D deficiency     Iron deficiency anemia, unspecified iron deficiency anemia type     Rapid heart beat     Past Surgical History:   Procedure Laterality Date     C HIP ARTHROSCOPY, DX  6/08    (R) Anterior superior labral debridement.      ESOPHAGOSCOPY, GASTROSCOPY, DUODENOSCOPY (EGD), COMBINED  11/1/2010    EGD     THYROIDECTOMY   2002    S/p thyroidectomy 2002       Social History     Tobacco Use     Smoking status: Never Smoker     Smokeless tobacco: Never Used   Substance Use Topics     Alcohol use: Yes     Comment: rare     Family History   Problem Relation Age of Onset     Diabetes Maternal Grandmother      Alcohol/Drug Maternal Grandmother      Gastrointestinal Disease Paternal Grandmother         IBS     Hypertension Paternal  "Grandmother      Cerebrovascular Disease Paternal Grandmother      Alzheimer Disease Paternal Grandmother      Hypertension Father      Arthritis Father      Heart Disease Father         cardimyopthy age 60, arrythmias     Lipids Father      Neurologic Disorder Sister         brain tumor     Gastrointestinal Disease Mother         celiac     C.A.D. No family hx of      Breast Cancer No family hx of      Cancer - colorectal No family hx of          Current Outpatient Medications   Medication Sig Dispense Refill     levothyroxine (SYNTHROID) 112 MCG tablet Take 1 tablet (112 mcg) by mouth daily 90 tablet 3     levothyroxine (SYNTHROID/LEVOTHROID) 125 MCG tablet Take 1 tablet (125 mcg) by mouth daily 90 tablet 3     order for DME Equipment being ordered: quarter inch heel lift 2 Units 0     SUMAtriptan (IMITREX) 50 MG tablet Take 50 mg by mouth as needed       VITAMIN D, CHOLECALCIFEROL, PO Take 1,000 Units by mouth daily       Allergies   Allergen Reactions     Gluten Other (See Comments)     Celiac disease     Macrobid [Nitrofuran Derivatives] Nausea and Vomiting     Zithromax [Azithromycin Dihydrate] Nausea and Vomiting     Labs reviewed in EPIC    Reviewed and updated as needed this visit by clinical staff       Reviewed and updated as needed this visit by Provider         ROS:  Constitutional, HEENT, cardiovascular, pulmonary, gi and gu systems are negative, except as otherwise noted.    OBJECTIVE:     /78   Pulse 79   Temp 98.6  F (37  C) (Tympanic)   Ht 1.626 m (5' 4\")   Wt 64.2 kg (141 lb 9.6 oz)   SpO2 99%   BMI 24.31 kg/m    Body mass index is 24.31 kg/m .  GENERAL: healthy, alert and no distress  PSYCH: mentation appears normal, affect normal/bright    Diagnostic Test Results:  Recent labs reviewed with patient.   Component      Latest Ref Rng & Units 1/8/2019   WBC      4.0 - 11.0 10e9/L 5.9   RBC Count      3.8 - 5.2 10e12/L 4.92   Hemoglobin      11.7 - 15.7 g/dL 14.4   Hematocrit      35.0 - " 47.0 % 41.5   MCV      78 - 100 fl 84   MCH      26.5 - 33.0 pg 29.3   MCHC      31.5 - 36.5 g/dL 34.7   RDW      10.0 - 15.0 % 12.2   Platelet Count      150 - 450 10e9/L 201   % Neutrophils      % 68.1   % Lymphocytes      % 20.3   % Monocytes      % 10.3   % Eosinophils      % 1.0   % Basophils      % 0.3   Absolute Neutrophil      1.6 - 8.3 10e9/L 4.0   Absolute Lymphocytes      0.8 - 5.3 10e9/L 1.2   Absolute Monocytes      0.0 - 1.3 10e9/L 0.6   Absolute Eosinophils      0.0 - 0.7 10e9/L 0.1   Absolute Basophils      0.0 - 0.2 10e9/L 0.0   Diff Method       Automated Method   Sodium      133 - 144 mmol/L 138   Potassium      3.4 - 5.3 mmol/L 3.7   Chloride      94 - 109 mmol/L 103   Carbon Dioxide      20 - 32 mmol/L 28   Anion Gap      3 - 14 mmol/L 7   Glucose      70 - 99 mg/dL 81   Urea Nitrogen      7 - 30 mg/dL 13   Creatinine      0.52 - 1.04 mg/dL 0.68   GFR Estimate      >60 mL/min/1.73:m2 >90   GFR Estimate If Black      >60 mL/min/1.73:m2 >90   Calcium      8.5 - 10.1 mg/dL 8.6   Bilirubin Total      0.2 - 1.3 mg/dL 1.4 (H)   Albumin      3.4 - 5.0 g/dL 4.1   Protein Total      6.8 - 8.8 g/dL 7.3   Alkaline Phosphatase      40 - 150 U/L 58   ALT      0 - 50 U/L 40   AST      0 - 45 U/L 21   TSH      0.40 - 4.00 mU/L 0.17 (L)   T4 Free      0.76 - 1.46 ng/dL 1.69 (H)   GGT      0 - 40 U/L 20   Bilirubin Direct      0.0 - 0.2 mg/dL 0.3 (H)       ASSESSMENT/PLAN:     Kimberley was seen today for recheck.    Diagnoses and all orders for this visit:    Fatty liver   Reassured patient that her minimally elevated bilirubin levels are unremarkable. No need for a General Surgery Consult.   Will repeat labs to see where they are trending.  -     Repeat: Hepatic panel (Albumin, ALT, AST, Bili, Alk Phos, TP)  -     US Abdomen Limited; Future    History of anemia  -     Check Ferritin per patient request    Acquired hypothyroidism  Levothyroxine dose recently decreased.   Repeat TSH due in 4-6 weeks      Schedule  a Physical Exam with a Pap at earliest convenience.       Coco Lepe MD  [facility]

## 2019-01-18 ENCOUNTER — ANCILLARY PROCEDURE (OUTPATIENT)
Dept: ULTRASOUND IMAGING | Facility: CLINIC | Age: 39
End: 2019-01-18
Payer: COMMERCIAL

## 2019-01-18 DIAGNOSIS — K76.0 FATTY LIVER: ICD-10-CM

## 2019-01-18 PROCEDURE — 76705 ECHO EXAM OF ABDOMEN: CPT

## 2019-03-11 ENCOUNTER — OFFICE VISIT (OUTPATIENT)
Dept: FAMILY MEDICINE | Facility: CLINIC | Age: 39
End: 2019-03-11
Payer: COMMERCIAL

## 2019-03-11 VITALS
SYSTOLIC BLOOD PRESSURE: 118 MMHG | RESPIRATION RATE: 18 BRPM | HEART RATE: 68 BPM | WEIGHT: 134.6 LBS | DIASTOLIC BLOOD PRESSURE: 76 MMHG | TEMPERATURE: 97.9 F | OXYGEN SATURATION: 97 % | BODY MASS INDEX: 22.98 KG/M2 | HEIGHT: 64 IN

## 2019-03-11 DIAGNOSIS — K90.0 CELIAC DISEASE: ICD-10-CM

## 2019-03-11 DIAGNOSIS — Z86.2 HISTORY OF IRON DEFICIENCY ANEMIA: ICD-10-CM

## 2019-03-11 DIAGNOSIS — E89.0 POSTOPERATIVE HYPOTHYROIDISM: ICD-10-CM

## 2019-03-11 DIAGNOSIS — Z00.00 ROUTINE GENERAL MEDICAL EXAMINATION AT A HEALTH CARE FACILITY: Primary | ICD-10-CM

## 2019-03-11 DIAGNOSIS — Z12.4 CERVICAL CANCER SCREENING: ICD-10-CM

## 2019-03-11 DIAGNOSIS — B35.4 TINEA CORPORIS: ICD-10-CM

## 2019-03-11 DIAGNOSIS — Z13.220 LIPID SCREENING: ICD-10-CM

## 2019-03-11 PROCEDURE — G0145 SCR C/V CYTO,THINLAYER,RESCR: HCPCS | Performed by: FAMILY MEDICINE

## 2019-03-11 PROCEDURE — 99395 PREV VISIT EST AGE 18-39: CPT | Performed by: FAMILY MEDICINE

## 2019-03-11 PROCEDURE — 87624 HPV HI-RISK TYP POOLED RSLT: CPT | Performed by: FAMILY MEDICINE

## 2019-03-11 PROCEDURE — 99213 OFFICE O/P EST LOW 20 MIN: CPT | Mod: 25 | Performed by: FAMILY MEDICINE

## 2019-03-11 RX ORDER — CLOTRIMAZOLE 1 %
CREAM (GRAM) TOPICAL 2 TIMES DAILY
COMMUNITY
Start: 2019-03-11 | End: 2020-02-18

## 2019-03-11 ASSESSMENT — ANXIETY QUESTIONNAIRES
1. FEELING NERVOUS, ANXIOUS, OR ON EDGE: SEVERAL DAYS
IF YOU CHECKED OFF ANY PROBLEMS ON THIS QUESTIONNAIRE, HOW DIFFICULT HAVE THESE PROBLEMS MADE IT FOR YOU TO DO YOUR WORK, TAKE CARE OF THINGS AT HOME, OR GET ALONG WITH OTHER PEOPLE: NOT DIFFICULT AT ALL
7. FEELING AFRAID AS IF SOMETHING AWFUL MIGHT HAPPEN: SEVERAL DAYS
6. BECOMING EASILY ANNOYED OR IRRITABLE: NOT AT ALL
3. WORRYING TOO MUCH ABOUT DIFFERENT THINGS: SEVERAL DAYS
2. NOT BEING ABLE TO STOP OR CONTROL WORRYING: NOT AT ALL
GAD7 TOTAL SCORE: 3
5. BEING SO RESTLESS THAT IT IS HARD TO SIT STILL: NOT AT ALL

## 2019-03-11 ASSESSMENT — PATIENT HEALTH QUESTIONNAIRE - PHQ9
SUM OF ALL RESPONSES TO PHQ QUESTIONS 1-9: 3
5. POOR APPETITE OR OVEREATING: NOT AT ALL

## 2019-03-11 ASSESSMENT — MIFFLIN-ST. JEOR: SCORE: 1275.54

## 2019-03-11 NOTE — PATIENT INSTRUCTIONS
Preventive Health Recommendations  Female Ages 26 - 39  Yearly exam:   See your health care provider every year in order to    Review health changes.     Discuss preventive care.      Review your medicines if you your doctor has prescribed any.    Until age 30: Get a Pap test every three years (more often if you have had an abnormal result).    After age 30: Talk to your doctor about whether you should have a Pap test every 3 years or have a Pap test with HPV screening every 5 years.   You do not need a Pap test if your uterus was removed (hysterectomy) and you have not had cancer.  You should be tested each year for STDs (sexually transmitted diseases), if you're at risk.   Talk to your provider about how often to have your cholesterol checked.  If you are at risk for diabetes, you should have a diabetes test (fasting glucose).  Shots: Get a flu shot each year. Get a tetanus shot every 10 years.   Nutrition:     Eat at least 5 servings of fruits and vegetables each day.    Eat whole-grain bread, whole-wheat pasta and brown rice instead of white grains and rice.    Get adequate Calcium and Vitamin D.     Lifestyle    Exercise at least 150 minutes a week (30 minutes a day, 5 days of the week). This will help you control your weight and prevent disease.    Limit alcohol to one drink per day.    No smoking.     Wear sunscreen to prevent skin cancer.    See your dentist every six months for an exam and cleaning.    Patient Education     Ringworm of the Skin    Ringworm is a fungal infection of the skin. Despite the name, a worm doesn't cause it. The cause of ringworm is a fungus that infects the outer layers of the skin. It is also not caused by bed bugs, scabies, or lice. These are totally different.  The medical term for ringworm is tinea. It can affect most parts of your body, although it seems to do better in moist areas of the body and around hair. It can be on almost any part of your body, including:    Arms,  hands, legs, chest, feet, and back    Scalp    Beard    Groin    Between the toes  Depending on where it is located, sometimes the name changes:    Tinea capitis (scalp)    Tinea cruris (groin)    Tinea corporis (body)    Tinea pedis (feet)  Causes  Ringworm is very common all over the world, including the [place] It can take less than 1 week up to 2 weeks before you develop the infection after being exposed. So, you may not figure out the exact cause.  It is spread through direct contact with:    An infected person or animal    Infected soil, or objects such as towels, clothing, and trejo  Symptoms  At first you might not notice ringworm. Or you may just see a small, red, often raised itchy spot or pimple. Sometimes there may only be one spot. At other times there may be several. Ringworm can look slightly different on different parts of the body, but there are some things are always present:    Irregular, round, oval or ring-shaped, which is why it's called ringworm    Clearer or lighter color at the center, since it spreads from the center of the spot outward    Red or inflamed look    Raised    Itchy    Scaly, dry, or flaky  Home care  Follow these tips to help care for yourself at home:    Leave it alone. Don't scratch at the rash or pick it. This can increase the chance of infection and scarring.    Take medicine as prescribed. If you were prescribed a cream, apply it exactly as directed. Make sure to put the cream not just on the rash, but also on the skin 1 or 2 inches around it. Medicine by mouth is sometimes needed, particularly for ringworm on the scalp. Take it as directed and until your healthcare provider says to stop.    Keep it from spreading to others. Untreated ringworm of the skin is contagious by skin-to-skin contact. Your child may return to school 2 days after treatment has started.  Prevention  To some degree, prevention depends on what part of your body was affected. In general, the following  good hygiene can help.    Clean up after you get dirty or sweaty, or after using a locker room.    When possible, don t share trejo and brushes.    Avoid having your skin and feet wet or damp for long periods.    Wear clean, loose-fitting underwear.  Follow-up care  Follow up with your healthcare provider as advised by our staff if the rash does not improve after 10 days of treatment or if the rash spreads to other areas of the body.  When to seek medical advice  Call your healthcare provider right away if any of these occur:    Redness around the rash gets worse    Fluid drains from the rash    Fever of 100.4 F (38 C) or higher, or as directed by your healthcare provider  Date Last Reviewed: 8/1/2016 2000-2018 [employer]. [address] [place] [address], [place] [address]. All rights reserved. This information is not intended as a substitute for professional medical care. Always follow your healthcare professional's instructions.

## 2019-03-11 NOTE — PROGRESS NOTES
SUBJECTIVE:   CC: Kimberley Choe is an 38 year old woman who presents for preventive health visit.     Healthy Habits:    Do you get at least three servings of calcium containing foods daily (dairy, green leafy vegetables, etc.)? yes    Amount of exercise or daily activities, outside of work: 7 day(s) per week    Problems taking medications regularly No    Medication side effects: No    Have you had an eye exam in the past two years? yes    Do you see a dentist twice per year? yes    Do you have sleep apnea, excessive snoring or daytime drowsiness?no      Hypothyroidism Follow-up  S/p near total thyroidectomy 10/15/2002 for multinodular goiter. Left residual tissue.     Since last visit, patient describes the following symptoms: anxiety.    Had a recent med change to her Levothyroxine      Derm Problem x1 month  Reporting red spot of skin with a ring around it on the right foot noticed x3 days after returning from [place] vacation.  Denies any itching, burning, or pain.     History of celiac disease confirmed by EGD and biopsy on 11/1/2010- managing well with a gluten free diet.     History of iron deficiency anemia-has received iron infusion (infed) in the past. Following with hematology.     HEALTH CARE MAINTENANCE: due for a Pap test.     Patient informed that anything we discuss that is not related to preventative medicine, may be billed for; patient verbalizes understanding.      Today's PHQ-2 Score:   PHQ-2 ( 1999 [employer]) 3/11/2019 4/25/2018   Q1: Little interest or pleasure in doing things 0 0   Q2: Feeling down, depressed or hopeless 0 0   PHQ-2 Score 0 0       Abuse: Current or Past(Physical, Sexual or Emotional)- No  Do you feel safe in your environment? Yes    Social History     Tobacco Use     Smoking status: Never Smoker     Smokeless tobacco: Never Used   Substance Use Topics     Alcohol use: Yes     Comment: rare     If you drink alcohol do you typically have >3 drinks per day or >7 drinks per  week? No                     Reviewed orders with patient.  Reviewed health maintenance and updated orders accordingly - Yes  BP Readings from Last 3 Encounters:   03/11/19 118/76   01/16/19 123/78   01/08/19 116/72    Wt Readings from Last 3 Encounters:   03/11/19 61.1 kg (134 lb 9.6 oz)   01/16/19 64.2 kg (141 lb 9.6 oz)   01/08/19 65.1 kg (143 lb 9.6 oz)                  Patient Active Problem List   Diagnosis     Hypothyroidism     Undiagnosed cardiac murmurs     Anemia     Hip pain     CARDIOVASCULAR SCREENING; LDL GOAL LESS THAN 160     Celiac disease     Joint pains     Fatty liver disease, nonalcoholic     Family history of congenital heart disease     Vitamin D deficiency     Iron deficiency anemia, unspecified iron deficiency anemia type     Rapid heart beat     Past Surgical History:   Procedure Laterality Date     C HIP ARTHROSCOPY, DX  6/08    (R) Anterior superior labral debridement.      ESOPHAGOSCOPY, GASTROSCOPY, DUODENOSCOPY (EGD), COMBINED  11/1/2010    EGD     THYROIDECTOMY   2002    S/p thyroidectomy 2002       Social History     Tobacco Use     Smoking status: Never Smoker     Smokeless tobacco: Never Used   Substance Use Topics     Alcohol use: Yes     Comment: rare     Family History   Problem Relation Age of Onset     Diabetes Maternal Grandmother      Alcohol/Drug Maternal Grandmother      Gastrointestinal Disease Paternal Grandmother         IBS     Hypertension Paternal Grandmother      Cerebrovascular Disease Paternal Grandmother      Alzheimer Disease Paternal Grandmother      Hypertension Father      Arthritis Father      Heart Disease Father         cardimyopthy age 60, arrythmias     Lipids Father      Neurologic Disorder Sister         brain tumor     Gastrointestinal Disease Mother         celiac     C.A.D. No family hx of      Breast Cancer No family hx of      Cancer - colorectal No family hx of          Current Outpatient Medications   Medication Sig Dispense Refill      clotrimazole (LOTRIMIN) 1 % external cream Apply topically 2 times daily To affected area x 2 weeks       levothyroxine (SYNTHROID) 112 MCG tablet Take 1 tablet (112 mcg) by mouth daily 90 tablet 3     order for DME Equipment being ordered: quarter inch heel lift 2 Units 0     VITAMIN D, CHOLECALCIFEROL, PO Take 1,000 Units by mouth daily       Allergies   Allergen Reactions     Gluten Other (See Comments)     Celiac disease     Macrobid [Nitrofuran Derivatives] Nausea and Vomiting     Zithromax [Azithromycin Dihydrate] Nausea and Vomiting       Mammogram not appropriate for this patient based on age.    Pertinent mammograms are reviewed under the imaging tab.  History of abnormal Pap smear: NO - age 30- 65 PAP every 3 years recommended  PAP / HPV Latest Ref Rng & Units 3/10/2016 2/22/2013 2/1/2012   PAP - NIL NIL NIL   HPV 16 DNA NEG Negative - -   HPV 18 DNA NEG Negative - -   OTHER HR HPV NEG Negative - -   HPV DNA INT/HIGH RISK - - - -     Reviewed and updated as needed this visit by clinical staff  Tobacco  Allergies  Meds  Med Hx  Surg Hx  Fam Hx  Soc Hx        Reviewed and updated as needed this visit by Provider  Tobacco  Med Hx  Surg Hx  Fam Hx  Soc Hx           ROS:  CONSTITUTIONAL: NEGATIVE for fever, chills, change in weight  INTEGUMENTARU/SKIN: NEGATIVE for worrisome rashes, moles or lesions  EYES: NEGATIVE for vision changes or irritation  ENT: NEGATIVE for ear, mouth and throat problems  RESP: NEGATIVE for significant cough or SOB  BREAST: NEGATIVE for masses, tenderness or discharge  CV: NEGATIVE for chest pain, palpitations or peripheral edema  GI: NEGATIVE for nausea, abdominal pain, heartburn, or change in bowel habits  : NEGATIVE for unusual urinary or vaginal symptoms. Periods are regular.  MUSCULOSKELETAL: NEGATIVE for significant arthralgias or myalgia  NEURO: NEGATIVE for weakness, dizziness or paresthesias  PSYCHIATRIC: NEGATIVE for changes in mood or affect    OBJECTIVE:   BP  "118/76   Pulse 68   Temp 97.9  F (36.6  C) (Tympanic)   Resp 18   Ht 1.626 m (5' 4\")   Wt 61.1 kg (134 lb 9.6 oz)   SpO2 97%   BMI 23.10 kg/m    EXAM:  GENERAL: healthy, alert and no distress  EYES: Eyes grossly normal to inspection, PERRL and conjunctivae and sclerae normal  HENT: ear canals and TM's normal, nose and mouth without ulcers or lesions  NECK: no adenopathy, no asymmetry, masses, or scars and thyroid normal to palpation  RESP: lungs clear to auscultation - no rales, rhonchi or wheezes  BREAST: normal without masses, tenderness or nipple discharge and no palpable axillary masses or adenopathy  CV: regular rate and rhythm, normal S1 S2, no S3 or S4, no murmur, click or rub, no peripheral edema and peripheral pulses strong  ABDOMEN: soft, nontender, no hepatosplenomegaly, no masses and bowel sounds normal   (female): normal female external genitalia, normal urethral meatus, vaginal mucosa pink, moist, well rugated, and normal cervix/adnexa/uterus without masses or discharge. Pap test completed.   MS: no gross musculoskeletal defects noted, no edema  SKIN: Annular lesion with an active border on the anterior aspect of the foot.   NEURO: Normal strength and tone, mentation intact and speech normal  PSYCH: mentation appears normal, affect normal/bright    Diagnostic Test Results:  See orders below    ASSESSMENT/PLAN:   Kimberley was seen today for physical.    Diagnoses and all orders for this visit:    Routine general medical examination at a health care facility  -     CBC with platelets; Future  -     Basic metabolic panel  (Ca, Cl, CO2, Creat, Gluc, K, Na, BUN); Future    Cervical cancer screening  -     Pap imaged thin layer screen with HPV - recommended age 30 - 65 years (select HPV order below)  -     HPV High Risk Types DNA Cervical    Lipid screening  -     Lipid panel reflex to direct LDL Fasting; Future    Postoperative hypothyroidism  -     TSH; Future    Celiac disease  Confirmed by EGD " "and biopsy on 11/1/2010- managing well with a gluten free diet.     History of iron deficiency anemia  -     CBC with platelets; Future  Following with Hematology    Tinea corporis  -     clotrimazole (LOTRIMIN) 1 % external cream; Apply topically 2 times daily To affected area x 2 weeks  Patient education and Handout given      COUNSELING:   Reviewed preventive health counseling, as reflected in patient instructions       Regular exercise       Healthy diet/nutrition    BP Readings from Last 1 Encounters:   03/11/19 118/76     Estimated body mass index is 23.1 kg/m  as calculated from the following:    Height as of this encounter: 1.626 m (5' 4\").    Weight as of this encounter: 61.1 kg (134 lb 9.6 oz).       reports that  has never smoked. she has never used smokeless tobacco.      Counseling Resources:  ATP IV Guidelines  Pooled Cohorts Equation Calculator  Breast Cancer Risk Calculator  FRAX Risk Assessment  ICSI Preventive Guidelines  Dietary Guidelines for Americans, 2010  USDA's MyPlate  ASA Prophylaxis  Lung CA Screening    Follow up annually and as needed thoughout the year.      Coco Lepe MD  [facility]  "

## 2019-03-12 DIAGNOSIS — E89.0 POSTOPERATIVE HYPOTHYROIDISM: ICD-10-CM

## 2019-03-12 DIAGNOSIS — Z13.220 LIPID SCREENING: ICD-10-CM

## 2019-03-12 DIAGNOSIS — Z86.2 HISTORY OF IRON DEFICIENCY ANEMIA: ICD-10-CM

## 2019-03-12 DIAGNOSIS — Z00.00 ROUTINE GENERAL MEDICAL EXAMINATION AT A HEALTH CARE FACILITY: ICD-10-CM

## 2019-03-12 LAB
ANION GAP SERPL CALCULATED.3IONS-SCNC: 7 MMOL/L (ref 3–14)
BUN SERPL-MCNC: 16 MG/DL (ref 7–30)
CALCIUM SERPL-MCNC: 8.4 MG/DL (ref 8.5–10.1)
CHLORIDE SERPL-SCNC: 109 MMOL/L (ref 94–109)
CHOLEST SERPL-MCNC: 166 MG/DL
CO2 SERPL-SCNC: 26 MMOL/L (ref 20–32)
CREAT SERPL-MCNC: 0.65 MG/DL (ref 0.52–1.04)
ERYTHROCYTE [DISTWIDTH] IN BLOOD BY AUTOMATED COUNT: 13.8 % (ref 10–15)
GFR SERPL CREATININE-BSD FRML MDRD: >90 ML/MIN/{1.73_M2}
GLUCOSE SERPL-MCNC: 97 MG/DL (ref 70–99)
HCT VFR BLD AUTO: 43.1 % (ref 35–47)
HDLC SERPL-MCNC: 52 MG/DL
HGB BLD-MCNC: 14.6 G/DL (ref 11.7–15.7)
LDLC SERPL CALC-MCNC: 103 MG/DL
MCH RBC QN AUTO: 29.1 PG (ref 26.5–33)
MCHC RBC AUTO-ENTMCNC: 33.9 G/DL (ref 31.5–36.5)
MCV RBC AUTO: 86 FL (ref 78–100)
NONHDLC SERPL-MCNC: 114 MG/DL
PLATELET # BLD AUTO: 196 10E9/L (ref 150–450)
POTASSIUM SERPL-SCNC: 4.4 MMOL/L (ref 3.4–5.3)
RBC # BLD AUTO: 5.02 10E12/L (ref 3.8–5.2)
SODIUM SERPL-SCNC: 142 MMOL/L (ref 133–144)
T4 FREE SERPL-MCNC: 1.13 NG/DL (ref 0.76–1.46)
TRIGL SERPL-MCNC: 55 MG/DL
TSH SERPL DL<=0.005 MIU/L-ACNC: 1.44 MU/L (ref 0.4–4)
WBC # BLD AUTO: 4.8 10E9/L (ref 4–11)

## 2019-03-12 PROCEDURE — 84443 ASSAY THYROID STIM HORMONE: CPT | Performed by: FAMILY MEDICINE

## 2019-03-12 PROCEDURE — 80061 LIPID PANEL: CPT | Performed by: FAMILY MEDICINE

## 2019-03-12 PROCEDURE — 84439 ASSAY OF FREE THYROXINE: CPT | Performed by: FAMILY MEDICINE

## 2019-03-12 PROCEDURE — 80048 BASIC METABOLIC PNL TOTAL CA: CPT | Performed by: FAMILY MEDICINE

## 2019-03-12 PROCEDURE — 36415 COLL VENOUS BLD VENIPUNCTURE: CPT | Performed by: FAMILY MEDICINE

## 2019-03-12 PROCEDURE — 85027 COMPLETE CBC AUTOMATED: CPT | Performed by: FAMILY MEDICINE

## 2019-03-12 ASSESSMENT — ANXIETY QUESTIONNAIRES: GAD7 TOTAL SCORE: 3

## 2019-03-13 LAB
COPATH REPORT: NORMAL
PAP: NORMAL

## 2019-03-15 LAB
FINAL DIAGNOSIS: NORMAL
HPV HR 12 DNA CVX QL NAA+PROBE: NEGATIVE
HPV16 DNA SPEC QL NAA+PROBE: NEGATIVE
HPV18 DNA SPEC QL NAA+PROBE: NEGATIVE
SPECIMEN DESCRIPTION: NORMAL
SPECIMEN SOURCE CVX/VAG CYTO: NORMAL

## 2019-09-24 ENCOUNTER — ANCILLARY PROCEDURE (OUTPATIENT)
Dept: GENERAL RADIOLOGY | Facility: CLINIC | Age: 39
End: 2019-09-24
Attending: FAMILY MEDICINE
Payer: COMMERCIAL

## 2019-09-24 ENCOUNTER — OFFICE VISIT (OUTPATIENT)
Dept: FAMILY MEDICINE | Facility: CLINIC | Age: 39
End: 2019-09-24
Payer: COMMERCIAL

## 2019-09-24 VITALS
WEIGHT: 133.4 LBS | BODY MASS INDEX: 22.9 KG/M2 | RESPIRATION RATE: 16 BRPM | DIASTOLIC BLOOD PRESSURE: 69 MMHG | OXYGEN SATURATION: 98 % | TEMPERATURE: 97.8 F | SYSTOLIC BLOOD PRESSURE: 111 MMHG | HEART RATE: 71 BPM

## 2019-09-24 DIAGNOSIS — M25.561 RIGHT KNEE PAIN, UNSPECIFIED CHRONICITY: Primary | ICD-10-CM

## 2019-09-24 PROCEDURE — 73562 X-RAY EXAM OF KNEE 3: CPT | Mod: RT

## 2019-09-24 PROCEDURE — 99213 OFFICE O/P EST LOW 20 MIN: CPT | Performed by: FAMILY MEDICINE

## 2019-09-24 NOTE — PROGRESS NOTES
Subjective     Kimberley Choe is a 39 year old female who presents to clinic today for the following health issues:    HPI   Knee Pain    Onset: over 1 month ago    Description:   Location: right knee; has a bony prominence that is getting larger and more painful.   Character: Dull ache, but gets sharp when bending it    Intensity: moderate, 3/10    Progression of Symptoms: same    Accompanying Signs & Symptoms:  Other symptoms: radiation of pain to ankle    History:   Previous similar pain: no       Precipitating factors:   Trauma or overuse: YES, Was lunging a horse, end of lunge rope hit knee     Alleviating factors:  Improved by: immobilization    Therapies Tried and outcome: tried a support wrap - no relief      Denies having any prior history of chronic knee pain.    Patient Active Problem List   Diagnosis     Hypothyroidism     Undiagnosed cardiac murmurs     Anemia     Hip pain     CARDIOVASCULAR SCREENING; LDL GOAL LESS THAN 160     Celiac disease     Joint pains     Fatty liver disease, nonalcoholic     Family history of congenital heart disease     Vitamin D deficiency     Iron deficiency anemia, unspecified iron deficiency anemia type     Rapid heart beat     Past Surgical History:   Procedure Laterality Date     C HIP ARTHROSCOPY, DX  6/08    (R) Anterior superior labral debridement.      ESOPHAGOSCOPY, GASTROSCOPY, DUODENOSCOPY (EGD), COMBINED  11/1/2010    EGD     THYROIDECTOMY   2002    S/p thyroidectomy 2002       Social History     Tobacco Use     Smoking status: Never Smoker     Smokeless tobacco: Never Used   Substance Use Topics     Alcohol use: Yes     Comment: rare     Family History   Problem Relation Age of Onset     Diabetes Maternal Grandmother      Alcohol/Drug Maternal Grandmother      Gastrointestinal Disease Paternal Grandmother         IBS     Hypertension Paternal Grandmother      Cerebrovascular Disease Paternal Grandmother      Alzheimer Disease Paternal Grandmother       Hypertension Father      Arthritis Father      Heart Disease Father         cardimyopthy age 60, arrythmias     Lipids Father      Neurologic Disorder Sister         brain tumor     Gastrointestinal Disease Mother         celiac     C.A.D. No family hx of      Breast Cancer No family hx of      Cancer - colorectal No family hx of          Current Outpatient Medications   Medication Sig Dispense Refill     levothyroxine (SYNTHROID) 112 MCG tablet Take 1 tablet (112 mcg) by mouth daily 90 tablet 3     clotrimazole (LOTRIMIN) 1 % external cream Apply topically 2 times daily To affected area x 2 weeks (Patient not taking: Reported on 9/24/2019)       order for DME Equipment being ordered: quarter inch heel lift 2 Units 0     VITAMIN D, CHOLECALCIFEROL, PO Take 1,000 Units by mouth daily       Allergies   Allergen Reactions     Gluten Other (See Comments)     Celiac disease     Macrobid [Nitrofuran Derivatives] Nausea and Vomiting     Zithromax [Azithromycin Dihydrate] Nausea and Vomiting         Reviewed and updated as needed this visit by Provider         Review of Systems   ROS COMP: Constitutional, HEENT, cardiovascular, pulmonary, gi and gu systems are negative, except as otherwise noted.      Objective    /69   Pulse 71   Temp 97.8  F (36.6  C) (Tympanic)   Resp 16   Wt 60.5 kg (133 lb 6.4 oz)   LMP 09/15/2019 (Within Days)   SpO2 98%   Breastfeeding? No   BMI 22.90 kg/m    Body mass index is 22.9 kg/m .  Physical Exam   GENERAL: healthy, alert and no distress  MS: RLE exam shows normal strength and muscle mass, no erythema, induration, or nodules, no evidence of joint effusion, ROM of all joints is normal and prominence of the tibial tuberosity.     Diagnostic Test Results:  Reviewed and discussed with patient prior to discharge.  Results for orders placed or performed in visit on 09/24/19   XR Knee Right 3 Views    Narrative    KNEE RIGHT THREE VIEWS  9/24/2019 5:03 PM     HISTORY: Right knee pain,  unspecified chronicity. Abnormal prominence  of patella.    COMPARISON: None.      Impression    IMPRESSION: No acute bony abnormality or significant degenerative  changes. Mild irregularity of the tibial tuberosity at the distal  patellar tendon insertion, possibly related to old Osgood-Schlatter's  disease. No joint space effusion.             Assessment & Plan     Kimberley was seen today for knee pain.    Diagnoses and all orders for this visit:    Right knee pain, unspecified chronicity with  prominence tibial tuberosity  -     XR Knee Right 3 Views  -     Referral to orthopedics   -    In the interim recommendedRICE therapy and OTC NSAIDs as needed.      Return in about 2 weeks (around 10/8/2019), or if symptoms worsen or fail to improve.    Coco Lepe MD  [facility]

## 2019-09-30 ENCOUNTER — OFFICE VISIT (OUTPATIENT)
Dept: ORTHOPEDICS | Facility: CLINIC | Age: 39
End: 2019-09-30
Payer: COMMERCIAL

## 2019-09-30 VITALS
SYSTOLIC BLOOD PRESSURE: 122 MMHG | WEIGHT: 134 LBS | DIASTOLIC BLOOD PRESSURE: 77 MMHG | HEIGHT: 64 IN | BODY MASS INDEX: 22.88 KG/M2

## 2019-09-30 DIAGNOSIS — M76.51 PATELLAR TENDINITIS OF RIGHT KNEE: Primary | ICD-10-CM

## 2019-09-30 DIAGNOSIS — M77.9 ENTHESITIS: ICD-10-CM

## 2019-09-30 PROCEDURE — 99204 OFFICE O/P NEW MOD 45 MIN: CPT | Performed by: PEDIATRICS

## 2019-09-30 ASSESSMENT — MIFFLIN-ST. JEOR: SCORE: 1267.82

## 2019-09-30 NOTE — PROGRESS NOTES
Sports Medicine Clinic Visit    PCP: Coco Lepe    Kimberley Choe is a 39 year old female who is seen in consultation at the request of  Coco Lepe M.D. presenting with right knee pain.    Injury: She reports 2 months of right knee pain. She states in Aug. Of 2019 she was working with a horse. The rope with a large knot she was using to lead the horse broke free and hit her leg just distal to her anterior knee. She has swelling and pain over the tibial tuberosity. She reports pain with flexion and riding horses.  - Has had occasional pain at other tibial tuberosity which is also a bit swollen, history of Achilles pain and swelling at the enthesis.  Some low back pain and stiffness.  - Does have celiac and history of hypothyroidism as well.    Location of Pain: right distal knee  Duration of Pain: 2 month(s)  Rating of Pain at worst: 7/10  Rating of Pain Currently: 1/10  Symptoms are better with: Ibuprofen, rest and compression  Symptoms are worse with: flexion and riding horses  Additional Features:   Positive: swelling   Negative: bruising, popping, grinding, catching, locking, instability, paresthesias, numbness and weakness  Other evaluation and/or treatments so far consists of: Nothing  Prior History of related problems: nothing    Social History: horse riding    Review of Systems  Skin: no bruising, yes swelling  Musculoskeletal: as above  Neurologic: no numbness, paresthesias  Remainder of review of systems is negative including constitutional, CV, pulmonary, GI, except as noted in HPI or medical history.    Patient's current problem list, past medical and surgical history, and family history were reviewed.    Patient Active Problem List   Diagnosis     Hypothyroidism     Undiagnosed cardiac murmurs     Anemia     Hip pain     CARDIOVASCULAR SCREENING; LDL GOAL LESS THAN 160     Celiac disease     Joint pains     Fatty liver disease, nonalcoholic     Family history of congenital heart disease  "    Vitamin D deficiency     Iron deficiency anemia, unspecified iron deficiency anemia type     Rapid heart beat     Past Medical History:   Diagnosis Date     Anemia, unspecified     with pregnancy 6/06     Celiac disease      Dysmenorrhea 2/1/2012     GERD (gastroesophageal reflux disease) 1/30/2014     Papanicolaou smear of cervix with low grade squamous intraepithelial lesion (LGSIL) 7/09    Colpo negative     Right hip labral tear 8/24/2006     Unspecified closed fracture of ankle     Left foot     Unspecified disorder of thyroid 10/20/2002    Thyroid disease, goiter nodules, S/p thyroidectomy 2002     Past Surgical History:   Procedure Laterality Date     C HIP ARTHROSCOPY, DX  6/08    (R) Anterior superior labral debridement.      ESOPHAGOSCOPY, GASTROSCOPY, DUODENOSCOPY (EGD), COMBINED  11/1/2010    EGD     THYROIDECTOMY   2002    S/p thyroidectomy 2002     Family History   Problem Relation Age of Onset     Diabetes Maternal Grandmother      Alcohol/Drug Maternal Grandmother      Gastrointestinal Disease Paternal Grandmother         IBS     Hypertension Paternal Grandmother      Cerebrovascular Disease Paternal Grandmother      Alzheimer Disease Paternal Grandmother      Hypertension Father      Arthritis Father      Heart Disease Father         cardimyopthy age 60, arrythmias     Lipids Father      Neurologic Disorder Sister         brain tumor     Gastrointestinal Disease Mother         celiac     C.A.D. No family hx of      Breast Cancer No family hx of      Cancer - colorectal No family hx of          Objective  /77   Ht 1.626 m (5' 4\")   Wt 60.8 kg (134 lb)   LMP 09/15/2019 (Within Days)   BMI 23.00 kg/m      GENERAL APPEARANCE: healthy, alert and no distress   GAIT: NORMAL  SKIN: no suspicious lesions or rashes  HEENT: Sclera clear, anicteric  CV: good peripheral pulses  RESP: Breathing not labored  NEURO: Normal strength and tone, mentation intact and speech normal  PSYCH:  mentation " appears normal and affect normal/bright    Bilateral Knee exam  Inspection:      anterior swelling right at tibial tubercle    Patella:      Normal patellar tracking noted through range of motion bilateral    Tender:      tibial tubercle right    Non Tender:      remainder of knee area bilateral    Knee ROM:      Full active and passive ROM with flexion and extension bilateral    Hip ROM:     Full active and passive ROM bilateral    Strength:      5-/5 with hip abduction right       5-/5 with hip flexion right       5/5 with hip adduction bilateral       5-/5 with knee flexion right       5/5 with knee extension bilateral    Special Tests:     neg (-) Janeen right       neg (-) Lachmans right       neg (-) anterior drawer right       neg (-) posterior drawer right       neg (-) varus at 0 deg and 30 deg right       neg (-) valgus at 0 deg and 30 deg right    Gait:      normal    Lower Extremity Alignment:      Normal    Evaluation of ipsilateral kinetic chain:        decreased strength single leg squat on  right side(s)    Neurovascular:      2+ peripheral pulses bilaterally and brisk capillary refill       sensation grossly intact    Radiology  I visualized and reviewed these images with the patient  Xr Knee Right 3 Views  Result Date: 9/24/2019  KNEE RIGHT THREE VIEWS  9/24/2019 5:03 PM HISTORY: Right knee pain, unspecified chronicity. Abnormal prominence of patella. COMPARISON: None.   IMPRESSION: No acute bony abnormality or significant degenerative changes. Mild irregularity of the tibial tuberosity at the distal patellar tendon insertion, possibly related to old Osgood-Schlatter's disease. No joint space effusion. LIV REIS MD    Assessment:  1. Patellar tendinitis of right knee    2. Enthesitis      Discussed treatment for patellar tendinitis and likely bursitis including physical therapy for overall strengthening.  Patient does have enthesitis at various joints as well.  Given that and her history of  autoimmune conditions, some concern for possible inflammatory component.  We discussed obtaining baseline labs and considering rheumatology referral pending clinical course.    Plan:  - Today's Plan of Care:  Rehab: Physical Therapy: [facility] - [phone]  Consider Patellar Strap  Inflammatory Labs    Follow Up: will call with labs  - Consider Rheumatology Referral    Concerning signs and symptoms were reviewed.  The patient expressed understanding of this management plan and all questions were answered at this time.    Thanks for the opportunity to participate in the care of this patient, I will keep you updated on their progress.    CC: Coco Boone MD CAQ  Primary Care Sports Medicine  [facility]

## 2019-09-30 NOTE — PATIENT INSTRUCTIONS
Plan:  - Today's Plan of Care:  Rehab: Physical Therapy: [facility] - [phone]  Consider Patellar Strap  Inflammatory Labs    Follow Up: will call with labs  - Consider Rheumatology Referral    If you have any further questions for your physician or physician s care team you can call [phone] and use option 3 to leave a voice message. Calls received during business hours will be returned same day.

## 2019-09-30 NOTE — LETTER
9/30/2019         RE: Kimberley Choe  [address] [facility] [address]        Dear Colleague,    Thank you for referring your patient, Kimberley Choe, to the [facility]. Please see a copy of my visit note below.    Sports Medicine Clinic Visit    PCP: Coco Lepe    Kimberley Choe is a 39 year old female who is seen in consultation at the request of  Coco Lepe M.D. presenting with right knee pain.    Injury: She reports 2 months of right knee pain. She states in Aug. Of 2019 she was working with a horse. The rope with a large knot she was using to lead the horse broke free and hit her leg just distal to her anterior knee. She has swelling and pain over the tibial tuberosity. She reports pain with flexion and riding horses.  - Has had occasional pain at other tibial tuberosity which is also a bit swollen, history of Achilles pain and swelling at the enthesis.  Some low back pain and stiffness.  - Does have celiac and history of hypothyroidism as well.    Location of Pain: right distal knee  Duration of Pain: 2 month(s)  Rating of Pain at worst: 7/10  Rating of Pain Currently: 1/10  Symptoms are better with: Ibuprofen, rest and compression  Symptoms are worse with: flexion and riding horses  Additional Features:   Positive: swelling   Negative: bruising, popping, grinding, catching, locking, instability, paresthesias, numbness and weakness  Other evaluation and/or treatments so far consists of: Nothing  Prior History of related problems: nothing    Social History: horse riding    Review of Systems  Skin: no bruising, yes swelling  Musculoskeletal: as above  Neurologic: no numbness, paresthesias  Remainder of review of systems is negative including constitutional, CV, pulmonary, GI, except as noted in HPI or medical history.    Patient's current problem list, past medical and surgical history, and family history were reviewed.    Patient Active Problem List  "  Diagnosis     Hypothyroidism     Undiagnosed cardiac murmurs     Anemia     Hip pain     CARDIOVASCULAR SCREENING; LDL GOAL LESS THAN 160     Celiac disease     Joint pains     Fatty liver disease, nonalcoholic     Family history of congenital heart disease     Vitamin D deficiency     Iron deficiency anemia, unspecified iron deficiency anemia type     Rapid heart beat     Past Medical History:   Diagnosis Date     Anemia, unspecified     with pregnancy 6/06     Celiac disease      Dysmenorrhea 2/1/2012     GERD (gastroesophageal reflux disease) 1/30/2014     Papanicolaou smear of cervix with low grade squamous intraepithelial lesion (LGSIL) 7/09    Colpo negative     Right hip labral tear 8/24/2006     Unspecified closed fracture of ankle     Left foot     Unspecified disorder of thyroid 10/20/2002    Thyroid disease, goiter nodules, S/p thyroidectomy 2002     Past Surgical History:   Procedure Laterality Date     C HIP ARTHROSCOPY, DX  6/08    (R) Anterior superior labral debridement.      ESOPHAGOSCOPY, GASTROSCOPY, DUODENOSCOPY (EGD), COMBINED  11/1/2010    EGD     THYROIDECTOMY   2002    S/p thyroidectomy 2002     Family History   Problem Relation Age of Onset     Diabetes Maternal Grandmother      Alcohol/Drug Maternal Grandmother      Gastrointestinal Disease Paternal Grandmother         IBS     Hypertension Paternal Grandmother      Cerebrovascular Disease Paternal Grandmother      Alzheimer Disease Paternal Grandmother      Hypertension Father      Arthritis Father      Heart Disease Father         cardimyopthy age 60, arrythmias     Lipids Father      Neurologic Disorder Sister         brain tumor     Gastrointestinal Disease Mother         celiac     C.A.D. No family hx of      Breast Cancer No family hx of      Cancer - colorectal No family hx of          Objective  /77   Ht 1.626 m (5' 4\")   Wt 60.8 kg (134 lb)   LMP 09/15/2019 (Within Days)   BMI 23.00 kg/m       GENERAL APPEARANCE: " healthy, alert and no distress   GAIT: NORMAL  SKIN: no suspicious lesions or rashes  HEENT: Sclera clear, anicteric  CV: good peripheral pulses  RESP: Breathing not labored  NEURO: Normal strength and tone, mentation intact and speech normal  PSYCH:  mentation appears normal and affect normal/bright    Bilateral Knee exam  Inspection:      anterior swelling right at tibial tubercle    Patella:      Normal patellar tracking noted through range of motion bilateral    Tender:      tibial tubercle right    Non Tender:      remainder of knee area bilateral    Knee ROM:      Full active and passive ROM with flexion and extension bilateral    Hip ROM:     Full active and passive ROM bilateral    Strength:      5-/5 with hip abduction right       5-/5 with hip flexion right       5/5 with hip adduction bilateral       5-/5 with knee flexion right       5/5 with knee extension bilateral    Special Tests:     neg (-) Janeen right       neg (-) Lachmans right       neg (-) anterior drawer right       neg (-) posterior drawer right       neg (-) varus at 0 deg and 30 deg right       neg (-) valgus at 0 deg and 30 deg right    Gait:      normal    Lower Extremity Alignment:      Normal    Evaluation of ipsilateral kinetic chain:        decreased strength single leg squat on  right side(s)    Neurovascular:      2+ peripheral pulses bilaterally and brisk capillary refill       sensation grossly intact    Radiology  I visualized and reviewed these images with the patient  Xr Knee Right 3 Views  Result Date: 9/24/2019  KNEE RIGHT THREE VIEWS  9/24/2019 5:03 PM HISTORY: Right knee pain, unspecified chronicity. Abnormal prominence of patella. COMPARISON: None.   IMPRESSION: No acute bony abnormality or significant degenerative changes. Mild irregularity of the tibial tuberosity at the distal patellar tendon insertion, possibly related to old Osgood-Schlatter's disease. No joint space effusion. LIV REIS MD    Assessment:  1.  Patellar tendinitis of right knee    2. Enthesitis      Discussed treatment for patellar tendinitis and likely bursitis including physical therapy for overall strengthening.  Patient does have enthesitis at various joints as well.  Given that and her history of autoimmune conditions, some concern for possible inflammatory component.  We discussed obtaining baseline labs and considering rheumatology referral pending clinical course.    Plan:  - Today's Plan of Care:  Rehab: Physical Therapy: [facility] - [phone]  Consider Patellar Strap  Inflammatory Labs    Follow Up: will call with labs  - Consider Rheumatology Referral    Concerning signs and symptoms were reviewed.  The patient expressed understanding of this management plan and all questions were answered at this time.    Thanks for the opportunity to participate in the care of this patient, I will keep you updated on their progress.    CC: Coco Boone MD CAQ  Primary Care Sports Medicine  [facility]    Again, thank you for allowing me to participate in the care of your patient.        Sincerely,        Maliha Boone MD

## 2019-10-01 DIAGNOSIS — M77.9 ENTHESITIS: ICD-10-CM

## 2019-10-01 LAB — ERYTHROCYTE [SEDIMENTATION RATE] IN BLOOD BY WESTERGREN METHOD: 4 MM/H (ref 0–20)

## 2019-10-01 PROCEDURE — 86431 RHEUMATOID FACTOR QUANT: CPT | Performed by: PEDIATRICS

## 2019-10-01 PROCEDURE — 85652 RBC SED RATE AUTOMATED: CPT | Performed by: PEDIATRICS

## 2019-10-01 PROCEDURE — 81374 HLA I TYPING 1 ANTIGEN LR: CPT | Performed by: PEDIATRICS

## 2019-10-01 PROCEDURE — 36415 COLL VENOUS BLD VENIPUNCTURE: CPT | Performed by: PEDIATRICS

## 2019-10-01 PROCEDURE — 86140 C-REACTIVE PROTEIN: CPT | Performed by: PEDIATRICS

## 2019-10-02 LAB — CRP SERPL-MCNC: <2.9 MG/L (ref 0–8)

## 2019-10-03 LAB — RHEUMATOID FACT SER NEPH-ACNC: <20 IU/ML (ref 0–20)

## 2019-10-04 LAB
B LOCUS: NORMAL
B27TEST METHOD: NORMAL

## 2019-11-05 ENCOUNTER — E-VISIT (OUTPATIENT)
Dept: FAMILY MEDICINE | Facility: CLINIC | Age: 39
End: 2019-11-05
Payer: COMMERCIAL

## 2019-11-05 DIAGNOSIS — Z87.898 H/O MOTION SICKNESS: Primary | ICD-10-CM

## 2019-11-05 PROCEDURE — 99444 ZZC PHYSICIAN ONLINE EVALUATION & MANAGEMENT SERVICE: CPT | Performed by: FAMILY MEDICINE

## 2019-11-06 ENCOUNTER — HEALTH MAINTENANCE LETTER (OUTPATIENT)
Age: 39
End: 2019-11-06

## 2019-11-06 RX ORDER — SCOLOPAMINE TRANSDERMAL SYSTEM 1 MG/1
1 PATCH, EXTENDED RELEASE TRANSDERMAL
Qty: 10 PATCH | Refills: 1 | Status: SHIPPED | OUTPATIENT
Start: 2019-11-06 | End: 2020-07-29

## 2019-12-02 ENCOUNTER — OFFICE VISIT (OUTPATIENT)
Dept: DERMATOLOGY | Facility: CLINIC | Age: 39
End: 2019-12-02
Payer: COMMERCIAL

## 2019-12-02 VITALS — OXYGEN SATURATION: 98 % | DIASTOLIC BLOOD PRESSURE: 67 MMHG | SYSTOLIC BLOOD PRESSURE: 118 MMHG | HEART RATE: 74 BPM

## 2019-12-02 DIAGNOSIS — L70.0 ACNE VULGARIS: Primary | ICD-10-CM

## 2019-12-02 PROCEDURE — 99214 OFFICE O/P EST MOD 30 MIN: CPT | Performed by: PHYSICIAN ASSISTANT

## 2019-12-02 RX ORDER — SPIRONOLACTONE 50 MG/1
TABLET, FILM COATED ORAL
Qty: 90 TABLET | Refills: 1 | Status: SHIPPED | OUTPATIENT
Start: 2019-12-02 | End: 2020-07-29

## 2019-12-02 NOTE — LETTER
12/2/2019         RE: Kimberley Choe  [address] [facility] [place] [place] [address]        Dear Colleague,    Thank you for referring your patient, Kimberley Choe, to the [facility]. Please see a copy of my visit note below.    HPI:   Chief complaints: Kimberley Choe is a 39 year old female who presents for evaluation of a spot on the hairline. Area was flaky and a little sore. Was present for about 2-3 months, but has been resolved for the past few days. Had a similar, if not the same, spot about 10 years ago which was treated with LN2 and made it resolve.     Additional concern: getting deep cystic acne on the chin    Condition present for:  2-3 months.   Previous treatments include: none    Review Of Systems  Eyes: negative  Ears/Nose/Throat: negative  Respiratory: No shortness of breath, dyspnea on exertion, cough, or hemoptysis  Cardiovascular: negative  Gastrointestinal: negative  Genitourinary: negative  Musculoskeletal: negative  Neurologic: negative  Psychiatric: negative  Skin: positive for lesion, positive for acne      PHYSICAL EXAM:    /67   Pulse 74   SpO2 98%   Skin exam performed as follows: Type 2 skin. Mood appropriate  Alert and Oriented X 3. Well developed, well nourished in no distress.  General appearance: Normal  Head including face: Normal  Eyes: conjunctiva and lids: Normal  Mouth: Lips, teeth, gums: Normal  Neck: Normal  Chest-breast/axillae: Normal  Back: Normal  Spleen and liver: Normal  Cardiovascular: Exam of peripheral vascular system by observation for swelling, varicosities, edema: Normal  Genitalia: groin, buttocks: Normal  Extremities: digits/nails (clubbing): Normal  Eccrine and Apocrine glands: Normal  Right upper extremity: Normal  Left upper extremity: Normal  Right lower extremity: Normal  Left lower extremity: Normal  Skin: Scalp and body hair: See below    1. Skin on the hairline clear  2. 1+ cysts on the chin    ASSESSMENT/PLAN:     1. Lesion on  the hairline appears to be resolved today. Will monitor for recurrence.   2. Acne Vulgaris - advised on diagnosis and treatment options. Discussed use of topical medications and antibiotics. She is done having children;  has a vasectomy.   --Start spironolactone 50 mg daily. Advised on potential for hypotension, teratogenetic effects, menstrual irregularities, hyperkalemia and need for Q 6 month K+ checks.   --Can call for an antibiotic if struggling (she prefers to avoid oral medications if possible).   3. Kimberley to follow up with Primary Care provider regarding elevated blood pressure.        Follow-up: 4 months for recheck acne   CC:   Scribed By: Lissa Espinal, MS, PAMARIAA        Again, thank you for allowing me to participate in the care of your patient.        Sincerely,        Lissa Espinal PA-C

## 2019-12-02 NOTE — PROGRESS NOTES
HPI:   Chief complaints: Kimberley Choe is a 39 year old female who presents for evaluation of a spot on the hairline. Area was flaky and a little sore. Was present for about 2-3 months, but has been resolved for the past few days. Had a similar, if not the same, spot about 10 years ago which was treated with LN2 and made it resolve.     Additional concern: getting deep cystic acne on the chin    Condition present for:  2-3 months.   Previous treatments include: none    Review Of Systems  Eyes: negative  Ears/Nose/Throat: negative  Respiratory: No shortness of breath, dyspnea on exertion, cough, or hemoptysis  Cardiovascular: negative  Gastrointestinal: negative  Genitourinary: negative  Musculoskeletal: negative  Neurologic: negative  Psychiatric: negative  Skin: positive for lesion, positive for acne      PHYSICAL EXAM:    /67   Pulse 74   SpO2 98%   Skin exam performed as follows: Type 2 skin. Mood appropriate  Alert and Oriented X 3. Well developed, well nourished in no distress.  General appearance: Normal  Head including face: Normal  Eyes: conjunctiva and lids: Normal  Mouth: Lips, teeth, gums: Normal  Neck: Normal  Chest-breast/axillae: Normal  Back: Normal  Spleen and liver: Normal  Cardiovascular: Exam of peripheral vascular system by observation for swelling, varicosities, edema: Normal  Genitalia: groin, buttocks: Normal  Extremities: digits/nails (clubbing): Normal  Eccrine and Apocrine glands: Normal  Right upper extremity: Normal  Left upper extremity: Normal  Right lower extremity: Normal  Left lower extremity: Normal  Skin: Scalp and body hair: See below    1. Skin on the hairline clear  2. 1+ cysts on the chin    ASSESSMENT/PLAN:     1. Lesion on the hairline appears to be resolved today. Will monitor for recurrence.   2. Acne Vulgaris - advised on diagnosis and treatment options. Discussed use of topical medications and antibiotics. She is done having children;  has a vasectomy.    --Start spironolactone 50 mg daily. Advised on potential for hypotension, teratogenetic effects, menstrual irregularities, hyperkalemia and need for Q 6 month K+ checks.   --Can call for an antibiotic if struggling (she prefers to avoid oral medications if possible).   3. Kimberley to follow up with Primary Care provider regarding elevated blood pressure.        Follow-up: 4 months for recheck acne   CC:   Scribed By: Lissa Espinal MS, PA-C

## 2020-02-18 ENCOUNTER — HOSPITAL ENCOUNTER (EMERGENCY)
Facility: CLINIC | Age: 40
Discharge: HOME | End: 2020-02-18
Attending: EMERGENCY MEDICINE | Admitting: EMERGENCY MEDICINE
Payer: COMMERCIAL

## 2020-02-18 VITALS
OXYGEN SATURATION: 95 % | HEIGHT: 64 IN | WEIGHT: 135 LBS | SYSTOLIC BLOOD PRESSURE: 109 MMHG | DIASTOLIC BLOOD PRESSURE: 63 MMHG | TEMPERATURE: 99.7 F | BODY MASS INDEX: 23.05 KG/M2 | RESPIRATION RATE: 18 BRPM | HEART RATE: 109 BPM

## 2020-02-18 DIAGNOSIS — G43.809 OTHER MIGRAINE WITHOUT STATUS MIGRAINOSUS, NOT INTRACTABLE: ICD-10-CM

## 2020-02-18 DIAGNOSIS — J10.1 INFLUENZA A: ICD-10-CM

## 2020-02-18 LAB
FLUAV+FLUBV AG SPEC QL: NEGATIVE
FLUAV+FLUBV AG SPEC QL: POSITIVE
SPECIMEN SOURCE: ABNORMAL

## 2020-02-18 PROCEDURE — 25800030 ZZH RX IP 258 OP 636: Performed by: EMERGENCY MEDICINE

## 2020-02-18 PROCEDURE — 96374 THER/PROPH/DIAG INJ IV PUSH: CPT | Performed by: EMERGENCY MEDICINE

## 2020-02-18 PROCEDURE — 25000128 H RX IP 250 OP 636: Performed by: EMERGENCY MEDICINE

## 2020-02-18 PROCEDURE — 96361 HYDRATE IV INFUSION ADD-ON: CPT | Performed by: EMERGENCY MEDICINE

## 2020-02-18 PROCEDURE — 99284 EMERGENCY DEPT VISIT MOD MDM: CPT | Mod: Z6 | Performed by: EMERGENCY MEDICINE

## 2020-02-18 PROCEDURE — 87804 INFLUENZA ASSAY W/OPTIC: CPT | Performed by: EMERGENCY MEDICINE

## 2020-02-18 PROCEDURE — 99284 EMERGENCY DEPT VISIT MOD MDM: CPT | Mod: 25 | Performed by: EMERGENCY MEDICINE

## 2020-02-18 PROCEDURE — 96375 TX/PRO/DX INJ NEW DRUG ADDON: CPT | Performed by: EMERGENCY MEDICINE

## 2020-02-18 RX ORDER — METOCLOPRAMIDE HYDROCHLORIDE 5 MG/ML
10 INJECTION INTRAMUSCULAR; INTRAVENOUS ONCE
Status: COMPLETED | OUTPATIENT
Start: 2020-02-18 | End: 2020-02-18

## 2020-02-18 RX ORDER — ONDANSETRON 4 MG/1
4 TABLET, ORALLY DISINTEGRATING ORAL ONCE
Status: COMPLETED | OUTPATIENT
Start: 2020-02-18 | End: 2020-02-18

## 2020-02-18 RX ORDER — DIPHENHYDRAMINE HYDROCHLORIDE 50 MG/ML
12.5 INJECTION INTRAMUSCULAR; INTRAVENOUS ONCE
Status: COMPLETED | OUTPATIENT
Start: 2020-02-18 | End: 2020-02-18

## 2020-02-18 RX ORDER — KETOROLAC TROMETHAMINE 15 MG/ML
15 INJECTION, SOLUTION INTRAMUSCULAR; INTRAVENOUS ONCE
Status: COMPLETED | OUTPATIENT
Start: 2020-02-18 | End: 2020-02-18

## 2020-02-18 RX ORDER — ONDANSETRON 4 MG/1
4 TABLET, ORALLY DISINTEGRATING ORAL EVERY 6 HOURS PRN
Qty: 10 TABLET | Refills: 0 | Status: SHIPPED | OUTPATIENT
Start: 2020-02-18 | End: 2020-02-21

## 2020-02-18 RX ADMIN — ONDANSETRON 4 MG: 4 TABLET, ORALLY DISINTEGRATING ORAL at 10:47

## 2020-02-18 RX ADMIN — KETOROLAC TROMETHAMINE 15 MG: 15 INJECTION, SOLUTION INTRAMUSCULAR; INTRAVENOUS at 11:23

## 2020-02-18 RX ADMIN — METOCLOPRAMIDE HYDROCHLORIDE 10 MG: 5 INJECTION INTRAMUSCULAR; INTRAVENOUS at 11:24

## 2020-02-18 RX ADMIN — SODIUM CHLORIDE, POTASSIUM CHLORIDE, SODIUM LACTATE AND CALCIUM CHLORIDE 1000 ML: 600; 310; 30; 20 INJECTION, SOLUTION INTRAVENOUS at 11:23

## 2020-02-18 RX ADMIN — DIPHENHYDRAMINE HYDROCHLORIDE 12.5 MG: 50 INJECTION, SOLUTION INTRAMUSCULAR; INTRAVENOUS at 11:23

## 2020-02-18 ASSESSMENT — MIFFLIN-ST. JEOR: SCORE: 1272.36

## 2020-02-18 NOTE — ED AVS SNAPSHOT
[facility] Emergency Department  [address] [address]  Phone:  [phone]  Fax:  [phone]                                    Kimberley Choe   MRN: [id number]    Department:  [facility] Emergency Department   Date of Visit:  2/18/2020           After Visit Summary Signature Page    I have received my discharge instructions, and my questions have been answered. I have discussed any challenges I see with this plan with the nurse or doctor.    ..........................................................................................................................................  Patient/Patient Representative Signature      ..........................................................................................................................................  Patient Representative Print Name and Relationship to Patient    ..................................................               ................................................  Date                                   Time    ..........................................................................................................................................  Reviewed by Signature/Title    ...................................................              ..............................................  Date                                               Time          22EPIC Rev 08/18

## 2020-02-18 NOTE — ED NOTES
Pt's daughter tested positive for influenza last night.   Pt was able to take ibuprofen/tylenol until during the night and then became nauseated and unable  To keep down medications.    Pt given zofran ODT  and influenza swab sent to lab.

## 2020-02-18 NOTE — DISCHARGE INSTRUCTIONS
Rest, plenty fluids, Tylenol ibuprofen for discomfort and fever    Return here for vomiting, trouble breathing, passing out or any other concern

## 2020-02-18 NOTE — ED PROVIDER NOTES
History     Chief Complaint   Patient presents with     Flu Symptoms     nausea,cough,bodyaches and fever     HPI  Kimberley Choe is a 39 year old female with history of hypothyroidism, celiac disease and iron deficiency anemia who presents to the Emergency Department with concern for cough, myalgias and fevers. Symptoms began three days ago with improvement in the cough today. She is taking tylenol to control her fever. This morning, patient felt nauseous, one episode of clear emesis after dry heaving. She has been unable to keep her medications down today. She has a headache, reports a history of migraines. She is having difficulty taking fluids due to nausea. She feels dizzy. Patient has also been taking pseudoephedrine, states she had a sinus infection last week. Patient did not receive an influenza immunization this season. Her daughter tested positive for influenza last week. She is a non-smoker. Occasional alcohol use. No illicit drug use.     Allergies:  Allergies   Allergen Reactions     Gluten Other (See Comments)     Celiac disease     Macrobid [Nitrofuran Derivatives] Nausea and Vomiting     Zithromax [Azithromycin Dihydrate] Nausea and Vomiting       Problem List:    Patient Active Problem List    Diagnosis Date Noted     Rapid heart beat 06/12/2018     Priority: Medium     Iron deficiency anemia, unspecified iron deficiency anemia type 12/13/2017     Priority: Medium     Fatty liver disease, nonalcoholic 10/03/2013     Priority: Medium     Joint pains 02/22/2013     Priority: Medium     Celiac disease 11/10/2010     Priority: Medium     CARDIOVASCULAR SCREENING; LDL GOAL LESS THAN 160 10/31/2010     Priority: Medium     Hip pain 09/13/2010     Priority: Medium     Anemia 06/17/2010     Priority: Medium     Better after diagnosis of celiac disease       Family history of congenital heart disease 08/29/2009     Priority: Medium     Overview:   evaluated  with  stress and  echo  2007       Vitamin D  deficiency 06/23/2009     Priority: Medium     Undiagnosed cardiac murmurs 03/07/2008     Priority: Medium     Echo ok.  No prophylactic antibiotics required       Hypothyroidism 12/20/2005     Priority: Medium     S/p thyroidectomy 2002          Past Medical History:    Past Medical History:   Diagnosis Date     Anemia, unspecified      Celiac disease      Dysmenorrhea 2/1/2012     GERD (gastroesophageal reflux disease) 1/30/2014     Papanicolaou smear of cervix with low grade squamous intraepithelial lesion (LGSIL) 7/09     Right hip labral tear 8/24/2006     Unspecified closed fracture of ankle      Unspecified disorder of thyroid 10/20/2002       Past Surgical History:    Past Surgical History:   Procedure Laterality Date     C HIP ARTHROSCOPY, DX  6/08    (R) Anterior superior labral debridement.      ESOPHAGOSCOPY, GASTROSCOPY, DUODENOSCOPY (EGD), COMBINED  11/1/2010    EGD     THYROIDECTOMY   2002    S/p thyroidectomy 2002       Family History:    Family History   Problem Relation Age of Onset     Diabetes Maternal Grandmother      Alcohol/Drug Maternal Grandmother      Gastrointestinal Disease Paternal Grandmother         IBS     Hypertension Paternal Grandmother      Cerebrovascular Disease Paternal Grandmother      Alzheimer Disease Paternal Grandmother      Hypertension Father      Arthritis Father      Heart Disease Father         cardimyopthy age 60, arrythmias     Lipids Father      Neurologic Disorder Sister         brain tumor     Gastrointestinal Disease Mother         celiac     C.A.D. No family hx of      Breast Cancer No family hx of      Cancer - colorectal No family hx of        Social History:  Marital Status:   [2]  Social History     Tobacco Use     Smoking status: Never Smoker     Smokeless tobacco: Never Used   Substance Use Topics     Alcohol use: Yes     Comment: rare     Drug use: No        Medications:    levothyroxine (SYNTHROID) 112 MCG tablet  ondansetron (ZOFRAN ODT) 4 MG  "ODT tab  VITAMIN D, CHOLECALCIFEROL, PO  scopolamine (TRANSDERM) 1 MG/3DAYS 72 hr patch  spironolactone (ALDACTONE) 50 MG tablet      Review of Systems  All other systems are reviewed and are negative.     Physical Exam   BP: 127/87  Pulse: 90  Heart Rate: 104  Temp: 102.3  F (39.1  C)  Resp: 18  Height: 162.6 cm (5' 4\")  Weight: 61.2 kg (135 lb)  SpO2: 99 %      Physical Exam  Nontoxic appearing no respiratory distress alert and oriented ×3, appears acutely ill  Head atraumatic normocephalic  TMs/EACs unremarkable, conjunctiva noninjected, oropharynx moist without lesions or erythema  No cervical adenopathy   Neck supple full active painless range of motion  Lungs clear to auscultation  Heart regular no murmur      ED Course        Procedures                 Results for orders placed or performed during the hospital encounter of 02/18/20 (from the past 24 hour(s))   Influenza A/B antigen   Result Value Ref Range    Influenza A/B Agn Specimen Nasopharyngeal     Influenza A Positive (A) NEG^Negative    Influenza B Negative NEG^Negative       Medications   ondansetron (ZOFRAN-ODT) ODT tab 4 mg (4 mg Oral Given 2/18/20 1047)   lactated ringers BOLUS 1,000 mL (0 mLs Intravenous Stopped 2/18/20 1236)   diphenhydrAMINE (BENADRYL) injection 12.5 mg (12.5 mg Intravenous Given 2/18/20 1123)   metoclopramide (REGLAN) injection 10 mg (10 mg Intravenous Given 2/18/20 1124)   ketorolac (TORADOL) injection 15 mg (15 mg Intravenous Given 2/18/20 1123)     10:58 AM Patient assessed.       Assessments & Plan (with Medical Decision Making)  39-year-old female presents with influenza-like illness, positive exposure to daughter recently.  Influenza a swab positive.  Presented today secondary to nausea vomiting.  Headache consistent with migraine, history of same.  No red flags/atypical features with respect to headache.  Responded well to above regimen.  Tolerating oral intake no vomiting here.  Ondansetron outpatient.  Return criteria " reviewed.     I have reviewed the nursing notes.    I have reviewed the findings, diagnosis, plan and need for follow up with the patient.              Discharge Medication List as of 2/18/2020 12:36 PM      START taking these medications    Details   ondansetron (ZOFRAN ODT) 4 MG ODT tab Take 1 tablet (4 mg) by mouth every 6 hours as needed for nausea, Disp-10 tablet, R-0, E-Prescribe             Final diagnoses:   Influenza A   Other migraine without status migrainosus, not intractable     This document serves as a record of the services and decisions personally performed and made by Shaq Fong MD. It was created on his behalf by Yris Montiel, a trained medical scribe. The creation of this document is based the provider's statements to the medical scribe.  Yris Montiel 11:20 AM 2/18/2020    Provider:   The information in this document, created by the medical scribe for me, accurately reflects the services I personally performed and the decisions made by me. I have reviewed and approved this document for accuracy prior to leaving the patient care area.  Shaq Fong MD 11:20 AM 2/18/2020 2/18/2020   [facility] EMERGENCY DEPARTMENT     Shaq Fong MD  02/18/20 [id number]

## 2020-02-20 ENCOUNTER — HOSPITAL ENCOUNTER (EMERGENCY)
Facility: CLINIC | Age: 40
Discharge: HOME | End: 2020-02-20
Attending: EMERGENCY MEDICINE | Admitting: EMERGENCY MEDICINE
Payer: COMMERCIAL

## 2020-02-20 VITALS
OXYGEN SATURATION: 97 % | TEMPERATURE: 99.4 F | DIASTOLIC BLOOD PRESSURE: 84 MMHG | WEIGHT: 135 LBS | HEART RATE: 96 BPM | RESPIRATION RATE: 18 BRPM | SYSTOLIC BLOOD PRESSURE: 98 MMHG | BODY MASS INDEX: 23.17 KG/M2

## 2020-02-20 DIAGNOSIS — R11.2 NON-INTRACTABLE VOMITING WITH NAUSEA, UNSPECIFIED VOMITING TYPE: ICD-10-CM

## 2020-02-20 LAB
ALBUMIN SERPL-MCNC: 3.7 G/DL (ref 3.4–5)
ALP SERPL-CCNC: 69 U/L (ref 40–150)
ALT SERPL W P-5'-P-CCNC: 33 U/L (ref 0–50)
ANION GAP SERPL CALCULATED.3IONS-SCNC: 6 MMOL/L (ref 3–14)
AST SERPL W P-5'-P-CCNC: 33 U/L (ref 0–45)
BASOPHILS # BLD AUTO: 0 10E9/L (ref 0–0.2)
BASOPHILS NFR BLD AUTO: 0 %
BILIRUB SERPL-MCNC: 0.6 MG/DL (ref 0.2–1.3)
BUN SERPL-MCNC: 7 MG/DL (ref 7–30)
CALCIUM SERPL-MCNC: 8.5 MG/DL (ref 8.5–10.1)
CHLORIDE SERPL-SCNC: 106 MMOL/L (ref 94–109)
CO2 SERPL-SCNC: 27 MMOL/L (ref 20–32)
CREAT SERPL-MCNC: 0.72 MG/DL (ref 0.52–1.04)
DIFFERENTIAL METHOD BLD: ABNORMAL
EOSINOPHIL # BLD AUTO: 0 10E9/L (ref 0–0.7)
EOSINOPHIL NFR BLD AUTO: 0 %
ERYTHROCYTE [DISTWIDTH] IN BLOOD BY AUTOMATED COUNT: 11.9 % (ref 10–15)
GFR SERPL CREATININE-BSD FRML MDRD: >90 ML/MIN/{1.73_M2}
GLUCOSE SERPL-MCNC: 94 MG/DL (ref 70–99)
HCG UR QL: NEGATIVE
HCT VFR BLD AUTO: 40.7 % (ref 35–47)
HGB BLD-MCNC: 13.9 G/DL (ref 11.7–15.7)
IMM GRANULOCYTES # BLD: 0 10E9/L (ref 0–0.4)
IMM GRANULOCYTES NFR BLD: 0.3 %
LYMPHOCYTES # BLD AUTO: 0.5 10E9/L (ref 0.8–5.3)
LYMPHOCYTES NFR BLD AUTO: 15.1 %
MCH RBC QN AUTO: 29.1 PG (ref 26.5–33)
MCHC RBC AUTO-ENTMCNC: 34.2 G/DL (ref 31.5–36.5)
MCV RBC AUTO: 85 FL (ref 78–100)
MONOCYTES # BLD AUTO: 0.4 10E9/L (ref 0–1.3)
MONOCYTES NFR BLD AUTO: 11.8 %
NEUTROPHILS # BLD AUTO: 2.5 10E9/L (ref 1.6–8.3)
NEUTROPHILS NFR BLD AUTO: 72.8 %
NRBC # BLD AUTO: 0 10*3/UL
NRBC BLD AUTO-RTO: 0 /100
PLATELET # BLD AUTO: 109 10E9/L (ref 150–450)
POTASSIUM SERPL-SCNC: 4 MMOL/L (ref 3.4–5.3)
PROT SERPL-MCNC: 7.4 G/DL (ref 6.8–8.8)
RBC # BLD AUTO: 4.77 10E12/L (ref 3.8–5.2)
SODIUM SERPL-SCNC: 139 MMOL/L (ref 133–144)
WBC # BLD AUTO: 3.4 10E9/L (ref 4–11)

## 2020-02-20 PROCEDURE — 96375 TX/PRO/DX INJ NEW DRUG ADDON: CPT

## 2020-02-20 PROCEDURE — 81025 URINE PREGNANCY TEST: CPT | Performed by: EMERGENCY MEDICINE

## 2020-02-20 PROCEDURE — 96374 THER/PROPH/DIAG INJ IV PUSH: CPT

## 2020-02-20 PROCEDURE — 80053 COMPREHEN METABOLIC PANEL: CPT | Performed by: FAMILY MEDICINE

## 2020-02-20 PROCEDURE — 96361 HYDRATE IV INFUSION ADD-ON: CPT

## 2020-02-20 PROCEDURE — 99284 EMERGENCY DEPT VISIT MOD MDM: CPT | Mod: Z6 | Performed by: EMERGENCY MEDICINE

## 2020-02-20 PROCEDURE — 25000128 H RX IP 250 OP 636: Performed by: EMERGENCY MEDICINE

## 2020-02-20 PROCEDURE — 99284 EMERGENCY DEPT VISIT MOD MDM: CPT | Mod: 25

## 2020-02-20 PROCEDURE — 25800030 ZZH RX IP 258 OP 636: Performed by: EMERGENCY MEDICINE

## 2020-02-20 PROCEDURE — 85025 COMPLETE CBC W/AUTO DIFF WBC: CPT | Performed by: FAMILY MEDICINE

## 2020-02-20 RX ORDER — ONDANSETRON 2 MG/ML
4 INJECTION INTRAMUSCULAR; INTRAVENOUS ONCE
Status: COMPLETED | OUTPATIENT
Start: 2020-02-20 | End: 2020-02-20

## 2020-02-20 RX ADMIN — SODIUM CHLORIDE, POTASSIUM CHLORIDE, SODIUM LACTATE AND CALCIUM CHLORIDE 1000 ML: 600; 310; 30; 20 INJECTION, SOLUTION INTRAVENOUS at 09:56

## 2020-02-20 RX ADMIN — PROCHLORPERAZINE EDISYLATE 5 MG: 5 INJECTION INTRAMUSCULAR; INTRAVENOUS at 09:56

## 2020-02-20 RX ADMIN — SODIUM CHLORIDE 1000 ML: 9 INJECTION, SOLUTION INTRAVENOUS at 09:30

## 2020-02-20 RX ADMIN — ONDANSETRON 4 MG: 2 INJECTION INTRAMUSCULAR; INTRAVENOUS at 09:30

## 2020-02-20 NOTE — DISCHARGE INSTRUCTIONS
Return to the emergency department if you have worsening pain, no urine output over 8 hours, lightheadedness, worsening symptoms, or other concerns.  If not better in the next 2 days get rechecked in clinic.

## 2020-02-20 NOTE — LETTER
February 20, 2020      To Whom It May Concern:      Kimberley Choe was seen in our Emergency Department today, 02/20/20.  If she is still having fevers when she is supposed to go on the cruise ship she should not go on her trip.    Sincerely,        Dipak Elizabeth MD

## 2020-02-20 NOTE — ED NOTES
Pt resting comfortably in bed. Ambulating to/from bathroom independently. Denies nausea/vomitting. Tolerating liquids and applesauce. VSS.

## 2020-02-20 NOTE — ED PROVIDER NOTES
History     Chief Complaint   Patient presents with     Flu Symptoms     dx with influenza 2 days ago. nauseated, retching, unable to eat     Nausea & Vomiting     HPI  Kimberley Choe is a 39 year old female who presents for nausea and vomiting.  The patient has had several days of fever with chills and cough and sore throat.  She was seen here 2 days ago and had a positive influenza A swab and declined oseltamivir at that time.  She has been using ibuprofen and occasional ondansetron at home and feels as if her cough and body aches have been improving but now over the past 2 days she has had increasing nausea and vomiting.  Emesis is nonbloody nonbilious.  No abdominal pain.  No diarrhea.  She denies any vaginal bleeding or discharge, normal period 3 weeks ago.  No prior abdominal surgeries.  No dysuria or urinary frequency.  No difficulty breathing.  Minor sore throat.  Mild headache, aching.  She has been trying ondansetron without improvement.  She did recently travel to [place] about 3 weeks ago.  No rash.  She does not recall getting bitten by mosquitoes on this trip.    Allergies:  Allergies   Allergen Reactions     Gluten Other (See Comments)     Celiac disease     Macrobid [Nitrofuran Derivatives] Nausea and Vomiting     Zithromax [Azithromycin Dihydrate] Nausea and Vomiting       Problem List:    Patient Active Problem List    Diagnosis Date Noted     Rapid heart beat 06/12/2018     Priority: Medium     Iron deficiency anemia, unspecified iron deficiency anemia type 12/13/2017     Priority: Medium     Fatty liver disease, nonalcoholic 10/03/2013     Priority: Medium     Joint pains 02/22/2013     Priority: Medium     Celiac disease 11/10/2010     Priority: Medium     CARDIOVASCULAR SCREENING; LDL GOAL LESS THAN 160 10/31/2010     Priority: Medium     Hip pain 09/13/2010     Priority: Medium     Anemia 06/17/2010     Priority: Medium     Better after diagnosis of celiac  disease       Family history of congenital heart disease 08/29/2009     Priority: Medium     Overview:   evaluated  with  stress and  echo  2007       Vitamin D deficiency 06/23/2009     Priority: Medium     Undiagnosed cardiac murmurs 03/07/2008     Priority: Medium     Echo ok.  No prophylactic antibiotics required       Hypothyroidism 12/20/2005     Priority: Medium     S/p thyroidectomy 2002          Past Medical History:    Past Medical History:   Diagnosis Date     Anemia, unspecified      Celiac disease      Dysmenorrhea 2/1/2012     GERD (gastroesophageal reflux disease) 1/30/2014     Papanicolaou smear of cervix with low grade squamous intraepithelial lesion (LGSIL) 7/09     Right hip labral tear 8/24/2006     Unspecified closed fracture of ankle      Unspecified disorder of thyroid 10/20/2002       Past Surgical History:    Past Surgical History:   Procedure Laterality Date     C HIP ARTHROSCOPY, DX  6/08    (R) Anterior superior labral debridement.      ESOPHAGOSCOPY, GASTROSCOPY, DUODENOSCOPY (EGD), COMBINED  11/1/2010    EGD     THYROIDECTOMY   2002    S/p thyroidectomy 2002       Family History:    Family History   Problem Relation Age of Onset     Diabetes Maternal Grandmother      Alcohol/Drug Maternal Grandmother      Gastrointestinal Disease Paternal Grandmother         IBS     Hypertension Paternal Grandmother      Cerebrovascular Disease Paternal Grandmother      Alzheimer Disease Paternal Grandmother      Hypertension Father      Arthritis Father      Heart Disease Father         cardimyopthy age 60, arrythmias     Lipids Father      Neurologic Disorder Sister         brain tumor     Gastrointestinal Disease Mother         celiac     C.A.D. No family hx of      Breast Cancer No family hx of      Cancer - colorectal No family hx of        Social History:  Marital Status:   [2]  Social History     Tobacco Use     Smoking status: Never Smoker     Smokeless tobacco: Never Used   Substance Use  Topics     Alcohol use: Yes     Comment: rare     Drug use: No        Medications:    levothyroxine (SYNTHROID) 112 MCG tablet  ondansetron (ZOFRAN ODT) 4 MG ODT tab  spironolactone (ALDACTONE) 50 MG tablet  VITAMIN D, CHOLECALCIFEROL, PO  scopolamine (TRANSDERM) 1 MG/3DAYS 72 hr patch          Review of Systems  Pertinent positives and negatives listed in the HPI, all other systems reviewed and are negative.    Physical Exam   BP: 106/73  Pulse: 89  Heart Rate: 94  Temp: 99.4  F (37.4  C)  Resp: 18  Weight: 61.2 kg (135 lb)  SpO2: 95 %      Physical Exam  Vitals signs and nursing note reviewed.   Constitutional:       General: She is in acute distress.      Appearance: She is well-developed. She is not diaphoretic.   HENT:      Head: Normocephalic and atraumatic.      Right Ear: External ear normal.      Left Ear: External ear normal.      Nose: Nose normal.   Eyes:      General: No scleral icterus.     Conjunctiva/sclera: Conjunctivae normal.   Neck:      Musculoskeletal: Normal range of motion.   Cardiovascular:      Rate and Rhythm: Normal rate and regular rhythm.   Pulmonary:      Effort: Pulmonary effort is normal. No respiratory distress.      Breath sounds: No stridor.   Abdominal:      General: There is no distension.      Palpations: Abdomen is soft.      Tenderness: There is no abdominal tenderness. There is no guarding or rebound.   Skin:     General: Skin is warm and dry.      Findings: Erythema (mild erythema around the neck, nontender) present.   Neurological:      Mental Status: She is alert and oriented to person, place, and time.   Psychiatric:         Behavior: Behavior normal.         ED Course        Procedures               Critical Care time:  none               Results for orders placed or performed during the hospital encounter of 02/20/20 (from the past 24 hour(s))   CBC with platelets, differential   Result Value Ref Range    WBC 3.4 (L) 4.0 - 11.0 10e9/L    RBC Count 4.77 3.8 - 5.2  10e12/L    Hemoglobin 13.9 11.7 - 15.7 g/dL    Hematocrit 40.7 35.0 - 47.0 %    MCV 85 78 - 100 fl    MCH 29.1 26.5 - 33.0 pg    MCHC 34.2 31.5 - 36.5 g/dL    RDW 11.9 10.0 - 15.0 %    Platelet Count 109 (L) 150 - 450 10e9/L    Diff Method Automated Method     % Neutrophils 72.8 %    % Lymphocytes 15.1 %    % Monocytes 11.8 %    % Eosinophils 0.0 %    % Basophils 0.0 %    % Immature Granulocytes 0.3 %    Nucleated RBCs 0 0 /100    Absolute Neutrophil 2.5 1.6 - 8.3 10e9/L    Absolute Lymphocytes 0.5 (L) 0.8 - 5.3 10e9/L    Absolute Monocytes 0.4 0.0 - 1.3 10e9/L    Absolute Eosinophils 0.0 0.0 - 0.7 10e9/L    Absolute Basophils 0.0 0.0 - 0.2 10e9/L    Abs Immature Granulocytes 0.0 0 - 0.4 10e9/L    Absolute Nucleated RBC 0.0    Comprehensive metabolic panel   Result Value Ref Range    Sodium 139 133 - 144 mmol/L    Potassium 4.0 3.4 - 5.3 mmol/L    Chloride 106 94 - 109 mmol/L    Carbon Dioxide 27 20 - 32 mmol/L    Anion Gap 6 3 - 14 mmol/L    Glucose 94 70 - 99 mg/dL    Urea Nitrogen 7 7 - 30 mg/dL    Creatinine 0.72 0.52 - 1.04 mg/dL    GFR Estimate >90 >60 mL/min/[1.73_m2]    GFR Estimate If Black >90 >60 mL/min/[1.73_m2]    Calcium 8.5 8.5 - 10.1 mg/dL    Bilirubin Total 0.6 0.2 - 1.3 mg/dL    Albumin 3.7 3.4 - 5.0 g/dL    Protein Total 7.4 6.8 - 8.8 g/dL    Alkaline Phosphatase 69 40 - 150 U/L    ALT 33 0 - 50 U/L    AST 33 0 - 45 U/L   HCG qualitative urine   Result Value Ref Range    HCG Qual Urine Negative NEG^Negative       Medications   0.9% sodium chloride BOLUS (1,000 mLs Intravenous New Bag 2/20/20 0930)   ondansetron (ZOFRAN) injection 4 mg (4 mg Intravenous Given 2/20/20 0930)   prochlorperazine (COMPAZINE) injection 5 mg (5 mg Intravenous Given 2/20/20 0956)   lactated ringers BOLUS 1,000 mL (1,000 mLs Intravenous New Bag 2/20/20 0956)       Assessments & Plan (with Medical Decision Making)   39-year-old female who presents for nausea and vomiting with the recent diagnosis of influenza A and recent  travel in [place].  Blood pressure is 106/73, heart rate 94, temperature is 99.4  F, SPO2 is 95% on room air.  She is given IV fluids and IV ondansetron for symptoms.  White blood cell count is 3.4.  Platelet count is 109.  Abdominal exam is benign and not concerning for an acute surgical process.  Without a rash or petechia, I think this is unlikely to be dengue fever and with her fever resolved now she seems to be on the mend.  The positive influenza swab from earlier also decreases the chance of other causes of the fever and now vomiting.  She is still nauseous after ondansetron was given IV prochlorperazine and after this was feeling much better.  She is tolerating oral intake and feels comfortable going home.  She is urinated here.  Vital signs are reassuring.  She is safe to discharge with instructions to return if she has worsening symptoms or other concerns, otherwise follow-up in clinic.  The patient is in agreement with this plan.    They are planning on going on another cruise soon and I advised them that they should cancel the trip if she is still having fevers at the time of departure.    I have reviewed the nursing notes.    I have reviewed the findings, diagnosis, plan and need for follow up with the patient.       New Prescriptions    No medications on file       Final diagnoses:   Non-intractable vomiting with nausea, unspecified vomiting type       2/20/2020   [facility] EMERGENCY DEPARTMENT     Dipak Elizabeth MD  02/20/20 7617

## 2020-02-20 NOTE — ED AVS SNAPSHOT
[facility] Emergency Department  [address] [address]  Phone:  [phone]  Fax:  [phone]                                    Kimberley Choe   MRN: [id number]    Department:  [facility] Emergency Department   Date of Visit:  2/20/2020           After Visit Summary Signature Page    I have received my discharge instructions, and my questions have been answered. I have discussed any challenges I see with this plan with the nurse or doctor.    ..........................................................................................................................................  Patient/Patient Representative Signature      ..........................................................................................................................................  Patient Representative Print Name and Relationship to Patient    ..................................................               ................................................  Date                                   Time    ..........................................................................................................................................  Reviewed by Signature/Title    ...................................................              ..............................................  Date                                               Time          22EPIC Rev 08/18

## 2020-03-13 ENCOUNTER — E-VISIT (OUTPATIENT)
Dept: FAMILY MEDICINE | Facility: CLINIC | Age: 40
End: 2020-03-13
Payer: COMMERCIAL

## 2020-03-13 DIAGNOSIS — E89.0 POSTOPERATIVE HYPOTHYROIDISM: ICD-10-CM

## 2020-03-13 PROCEDURE — 99421 OL DIG E/M SVC 5-10 MIN: CPT | Performed by: FAMILY MEDICINE

## 2020-03-16 RX ORDER — LEVOTHYROXINE SODIUM 125 UG/1
125 TABLET ORAL DAILY
Qty: 90 TABLET | Refills: 1 | Status: SHIPPED | OUTPATIENT
Start: 2020-03-16 | End: 2020-07-29

## 2020-05-01 ENCOUNTER — E-VISIT (OUTPATIENT)
Dept: FAMILY MEDICINE | Facility: CLINIC | Age: 40
End: 2020-05-01
Payer: COMMERCIAL

## 2020-05-01 DIAGNOSIS — N39.0 URINARY TRACT INFECTION WITHOUT HEMATURIA, SITE UNSPECIFIED: Primary | ICD-10-CM

## 2020-05-01 PROCEDURE — 99421 OL DIG E/M SVC 5-10 MIN: CPT | Performed by: FAMILY MEDICINE

## 2020-05-01 RX ORDER — SULFAMETHOXAZOLE/TRIMETHOPRIM 800-160 MG
1 TABLET ORAL 2 TIMES DAILY
Qty: 6 TABLET | Refills: 0 | Status: SHIPPED | OUTPATIENT
Start: 2020-05-01 | End: 2020-05-04

## 2020-05-01 NOTE — PATIENT INSTRUCTIONS
"  Patient Education     Bladder Infection, Female (Adult)    Urine is normally doesn't have any bacteria in it. But bacteria can get into the urinary tract from the skin around the rectum. Or they can travel in the blood from elsewhere in the body. Once they are in your urinary tract, they can cause infection in the urethra (urethritis), the bladder (cystitis), or the kidneys (pyelonephritis).  The most common place for an infection is in the bladder. This is called a bladder infection. This is one of the most common infections in women. Most bladder infections are easily treated. They are not serious unless the infection spreads to the kidney.  The phrases \"bladder infection,\" \"UTI,\" and \"cystitis\" are often used to describe the same thing. But they are not always the same. Cystitis is an inflammation of the bladder. The most common cause of cystitis is an infection.  Symptoms  The infection causes inflammation in the urethra and bladder. This causes many of the symptoms. The most common symptoms of a bladder infection are:    Pain or burning when urinating    Having to urinate more often than usual    Urgent need to urinate    Only a small amount of urine comes out    Blood in urine    Abdominal discomfort. This is usually in the lower abdomen above the pubic bone.    Cloudy urine    Strong- or bad-smelling urine    Unable to urinate (urinary retention)    Unable to hold urine in (urinary incontinence)    Fever    Loss of appetite    Confusion (in older adults)  Causes  Bladder infections are not contagious. You can't get one from someone else, from a toilet seat, or from sharing a bath.  The most common cause of bladder infections is bacteria from the bowels. The bacteria get onto the skin around the opening of the urethra. From there, they can get into the urine and travel up to the bladder, causing inflammation and infection. This usually happens because of:    Wiping improperly after urinating. Always wipe " from front to back.    Bowel incontinence    Pregnancy    Procedures such as having a catheter inserted    Older age    Not emptying your bladder. This can allow bacteria a chance to grow in your urine.    Dehydration    Constipation    Sex    Use of a diaphragm for birth control   Treatment  Bladder infections are diagnosed by a urine test. They are treated with antibiotics and usually clear up quickly without complications. Treatment helps prevent a more serious kidney infection.  Medicines  Medicines can help in the treatment of a bladder infection:    Take antibiotics until they are used up, even if you feel better. It is important to finish them to make sure the infection has cleared.    You can use acetaminophen or ibuprofen for pain, fever, or discomfort, unless another medicine was prescribed. If you have chronic liver or kidney disease, talk with your healthcare provider before using these medicines. Also talk with your provider if you've ever had a stomach ulcer or gastrointestinal bleeding, or are taking blood-thinner medicines.    If you are given phenazopydridine to reduce burning with urination, it will cause your urine to become a bright orange color. This can stain clothing.  Care and prevention  These self-care steps can help prevent future infections:    Drink plenty of fluids to prevent dehydration and flush out your bladder. Do this unless you must restrict fluids for other health reasons, or your doctor told you not to.    Proper cleaning after going to the bathroom is important. Wipe from front to back after using the toilet to prevent the spread of bacteria.    Urinate more often. Don't try to hold urine in for a long time.    Wear loose-fitting clothes and cotton underwear. Avoid tight-fitting pants.    Improve your diet and prevent constipation. Eat more fresh fruit and vegetables, and fiber, and less junk and fatty foods.    Avoid sex until your symptoms are gone.    Avoid caffeine,  alcohol, and spicy foods. These can irritate your bladder.    Urinate right after intercourse to flush out your bladder.    If you use birth control pills and have frequent bladder infections, discuss it with your doctor.  Follow-up care  Call your healthcare provider if all symptoms are not gone after 3 days of treatment. This is especially important if you have repeat infections.  If a culture was done, you will be told if your treatment needs to be changed. If directed, you can call to find out the results.  If X-rays were done, you will be told if the results will affect your treatment.  Call 911  Call 911 if any of the following occur:    Trouble breathing    Hard to wake up or confusion    Fainting or loss of consciousness    Rapid heart rate  When to seek medical advice  Call your healthcare provider right away if any of these occur:    Fever of 100.4 F (38.0 C) or higher, or as directed by your healthcare provider    Symptoms are not better by the third day of treatment    Back or belly (abdominal) pain that gets worse    Repeated vomiting, or unable to keep medicine down    Weakness or dizziness    Vaginal discharge    Pain, redness, or swelling in the outer vaginal area (labia)  Date Last Reviewed: 10/1/2016    5906-4933 [employer]. [address] [place] [address], [place] [address]. All rights reserved. This information is not intended as a substitute for professional medical care. Always follow your healthcare professional's instructions.

## 2020-07-28 DIAGNOSIS — Z13.220 LIPID SCREENING: ICD-10-CM

## 2020-07-28 DIAGNOSIS — E03.9 ACQUIRED HYPOTHYROIDISM: ICD-10-CM

## 2020-07-28 PROCEDURE — 84439 ASSAY OF FREE THYROXINE: CPT | Performed by: FAMILY MEDICINE

## 2020-07-28 PROCEDURE — 84443 ASSAY THYROID STIM HORMONE: CPT | Performed by: FAMILY MEDICINE

## 2020-07-28 PROCEDURE — 36415 COLL VENOUS BLD VENIPUNCTURE: CPT | Performed by: FAMILY MEDICINE

## 2020-07-29 ENCOUNTER — MYC MEDICAL ADVICE (OUTPATIENT)
Dept: FAMILY MEDICINE | Facility: CLINIC | Age: 40
End: 2020-07-29

## 2020-07-29 DIAGNOSIS — E89.0 POSTOPERATIVE HYPOTHYROIDISM: ICD-10-CM

## 2020-07-29 LAB
T4 FREE SERPL-MCNC: 1.18 NG/DL (ref 0.76–1.46)
TSH SERPL DL<=0.005 MIU/L-ACNC: 9.27 MU/L (ref 0.4–4)

## 2020-07-29 RX ORDER — LEVOTHYROXINE SODIUM 150 UG/1
150 TABLET ORAL DAILY
Qty: 30 TABLET | Refills: 1 | Status: SHIPPED | OUTPATIENT
Start: 2020-07-29 | End: 2020-10-08

## 2020-10-07 DIAGNOSIS — E89.0 POSTOPERATIVE HYPOTHYROIDISM: ICD-10-CM

## 2020-10-07 DIAGNOSIS — Z13.220 LIPID SCREENING: ICD-10-CM

## 2020-10-07 LAB
CHOLEST SERPL-MCNC: 155 MG/DL
HDLC SERPL-MCNC: 44 MG/DL
LDLC SERPL CALC-MCNC: 87 MG/DL
NONHDLC SERPL-MCNC: 111 MG/DL
TRIGL SERPL-MCNC: 120 MG/DL
TSH SERPL DL<=0.005 MIU/L-ACNC: 2.95 MU/L (ref 0.4–4)

## 2020-10-07 PROCEDURE — 84443 ASSAY THYROID STIM HORMONE: CPT | Performed by: FAMILY MEDICINE

## 2020-10-07 PROCEDURE — 80061 LIPID PANEL: CPT | Performed by: FAMILY MEDICINE

## 2020-10-08 RX ORDER — LEVOTHYROXINE SODIUM 150 UG/1
TABLET ORAL
Qty: 90 TABLET | Refills: 3 | Status: SHIPPED | OUTPATIENT
Start: 2020-10-08 | End: 2021-09-29

## 2020-11-27 ENCOUNTER — OFFICE VISIT (OUTPATIENT)
Dept: FAMILY MEDICINE | Facility: CLINIC | Age: 40
End: 2020-11-27
Payer: COMMERCIAL

## 2020-11-27 VITALS
DIASTOLIC BLOOD PRESSURE: 78 MMHG | WEIGHT: 152.8 LBS | SYSTOLIC BLOOD PRESSURE: 135 MMHG | BODY MASS INDEX: 26.23 KG/M2 | TEMPERATURE: 97.1 F | RESPIRATION RATE: 16 BRPM | HEART RATE: 73 BPM | OXYGEN SATURATION: 98 %

## 2020-11-27 DIAGNOSIS — R10.11 RUQ ABDOMINAL PAIN: Primary | ICD-10-CM

## 2020-11-27 LAB
ALBUMIN SERPL-MCNC: 4.1 G/DL (ref 3.4–5)
ALP SERPL-CCNC: 66 U/L (ref 40–150)
ALT SERPL W P-5'-P-CCNC: 27 U/L (ref 0–50)
ANION GAP SERPL CALCULATED.3IONS-SCNC: 4 MMOL/L (ref 3–14)
AST SERPL W P-5'-P-CCNC: 17 U/L (ref 0–45)
BASOPHILS # BLD AUTO: 0 10E9/L (ref 0–0.2)
BASOPHILS NFR BLD AUTO: 0.5 %
BILIRUB SERPL-MCNC: 0.9 MG/DL (ref 0.2–1.3)
BUN SERPL-MCNC: 10 MG/DL (ref 7–30)
CALCIUM SERPL-MCNC: 8.2 MG/DL (ref 8.5–10.1)
CHLORIDE SERPL-SCNC: 105 MMOL/L (ref 94–109)
CO2 SERPL-SCNC: 29 MMOL/L (ref 20–32)
CREAT SERPL-MCNC: 0.77 MG/DL (ref 0.52–1.04)
DIFFERENTIAL METHOD BLD: NORMAL
EOSINOPHIL # BLD AUTO: 0.1 10E9/L (ref 0–0.7)
EOSINOPHIL NFR BLD AUTO: 2 %
ERYTHROCYTE [DISTWIDTH] IN BLOOD BY AUTOMATED COUNT: 13.1 % (ref 10–15)
GFR SERPL CREATININE-BSD FRML MDRD: >90 ML/MIN/{1.73_M2}
GLUCOSE SERPL-MCNC: 89 MG/DL (ref 70–99)
HCT VFR BLD AUTO: 42.2 % (ref 35–47)
HGB BLD-MCNC: 14.3 G/DL (ref 11.7–15.7)
LYMPHOCYTES # BLD AUTO: 1.3 10E9/L (ref 0.8–5.3)
LYMPHOCYTES NFR BLD AUTO: 24.3 %
MCH RBC QN AUTO: 28.5 PG (ref 26.5–33)
MCHC RBC AUTO-ENTMCNC: 33.9 G/DL (ref 31.5–36.5)
MCV RBC AUTO: 84 FL (ref 78–100)
MONOCYTES # BLD AUTO: 0.6 10E9/L (ref 0–1.3)
MONOCYTES NFR BLD AUTO: 11.6 %
NEUTROPHILS # BLD AUTO: 3.4 10E9/L (ref 1.6–8.3)
NEUTROPHILS NFR BLD AUTO: 61.6 %
PLATELET # BLD AUTO: 231 10E9/L (ref 150–450)
POTASSIUM SERPL-SCNC: 4.1 MMOL/L (ref 3.4–5.3)
PROT SERPL-MCNC: 7.5 G/DL (ref 6.8–8.8)
RBC # BLD AUTO: 5.01 10E12/L (ref 3.8–5.2)
SODIUM SERPL-SCNC: 138 MMOL/L (ref 133–144)
WBC # BLD AUTO: 5.5 10E9/L (ref 4–11)

## 2020-11-27 PROCEDURE — 36415 COLL VENOUS BLD VENIPUNCTURE: CPT | Performed by: NURSE PRACTITIONER

## 2020-11-27 PROCEDURE — 85025 COMPLETE CBC W/AUTO DIFF WBC: CPT | Performed by: NURSE PRACTITIONER

## 2020-11-27 PROCEDURE — 99214 OFFICE O/P EST MOD 30 MIN: CPT | Performed by: NURSE PRACTITIONER

## 2020-11-27 PROCEDURE — 80053 COMPREHEN METABOLIC PANEL: CPT | Performed by: NURSE PRACTITIONER

## 2020-11-27 NOTE — RESULT ENCOUNTER NOTE
Waldo Chu,    Thank you for your recent office visit.    Here are your recent results.  Your calcium is low, make sure you are taking a daily multivitamin.  Other blood labs are normal.     Feel free to contact me via [employer] or call the clinic at [phone].    Sincerely,    MING Kevin, FNP-BC

## 2020-11-27 NOTE — PATIENT INSTRUCTIONS
Patient Education     Possible Gallstone with Biliary Colic (Presumed)    Your abdominal pain is may be due to spasm of the gallbladder. This is called gallbladder or biliary colic. The gallbladder is a small sac under the liver, which stores and releases bile. Bile is a fluid that aids in the digestion of fat. Eating fatty food stimulates the gallbladder to contract, and release the bile. A gallstone may form in this sac. Although most people don't have symptoms, when the stone moves and blocks the passage of bile out of the bladder, it can cause pain and even an infection.  To be more certain of the diagnosis, you may need to have an ultrasound, CT-scan or other special test.  A number of things increase the risk for developing gallstones:    Being female    Obesity    Increasing age    Losing or gaining weight quickly    High calorie diet    Pregnancy    Hormone therapy    Diabetes  The most common symptoms are:    Abdominal pain, cramping, aching    Nausea, vomiting    Fever  Many illnesses can cause these symptoms. This pain usually starts in the upper right side of your abdomen. Sometimes it can radiate to your right shoulder, back and arm. It usually starts suddenly, becomes more intense quickly, and then gradually decreases and disappears over a couple of hours. Elderly people and diabetics may have trouble showing where the pain is exactly. The pain may occur after meals, especially a high fat meal.  Home care    Rest in bed and follow a clear liquid diet until feeling better. If pain or nausea medicine was given to help with your symptoms, take these as directed.    You can take acetaminophen or ibuprofen for pain, unless you were given a different pain medicine to use. Note: If you have chronic liver or kidney disease or ever had a stomach ulcer or gastrointestinal bleeding or are taking blood thinner medicines, talk with your healthcare provider before using these medicines.    Fat in your diet makes  the gallbladder contract and may cause increased pain. Therefore, limit fat in your diet over the next 2 days and follow a low-fat diet after that. If you are overweight, a low fat diet will help you lose weight.  Follow-up care  If a test was already scheduled for you, keep this appointment. Be sure you know how to prepare yourself for the test. Usually, you will be asked not to eat or drink anything for at least 8 hours before the test. Schedule an appointment with your own healthcare provider after your test is complete to discuss the findings. Biliary colic tends to recur and so treatment is usually needed. This treatment usually includes surgical removal of the gallbladder, called a cholecystectomy.  When to seek medical advice  Call your healthcare provider if any of the following occur:    Pain gets worse or moves to the right lower abdomen    Repeated vomiting    Swelling of the abdomen    Pain lasts over 6 hours    Fever of 100.4  F (38  C) or higher, or as directed by your healthcare provider    Weakness, dizziness    Dark urine or light colored stools    Yellow color of the skin or eyes    Chest, arm, back, neck or jaw pain  Call 911  Call 911 if any of these occur:    Trouble breathing    Very confused    Very drowsy or trouble awakening    Fainting or loss of consciousness    Rapid heart rate  [employer] last reviewed this educational content on 5/1/2018 2000-2020 [employer]. [address] [place] [address]. All rights reserved. This information is not intended as a substitute for professional medical care. Always follow your healthcare professional's instructions.           Patient Education     Unknown Causes of Abdominal Pain (Female)    The exact cause of your belly (abdominal) pain is not clear. This does not mean that this is something to worry about. Everyone likes to know the exact cause of the problem. But sometimes with belly pain, there is no clear-cut cause, and  this could be a good thing. The good news is that your symptoms can be treated, and you will feel better.   Your condition does not seem serious now. But sometimes the signs of a serious problem may take more time to appear. For this reason, it is important for you to watch for any new symptoms, problems, or worsening of your condition.  Over the next few days, the abdominal pain may come and go. Or it may be constant. Other common symptoms can include nausea and vomiting. Sometimes it can be difficult to tell if you feel nauseous. You may just feel bad and not connect that feeling to nausea. Constipation, diarrhea, and a fever may go along with the pain.  The pain may continue even if treated correctly over the following days. Depending on how things go, sometimes the cause can become clear and may need more or different treatment. Additional evaluations, medicines, or tests may also be needed.  Home care  Your healthcare provider may prescribe medicine for pain, symptoms, or an infection.  Follow the healthcare provider's instructions for taking these medicines.  General care    Rest as much as you can until your next exam. No strenuous activities.    Try to find positions that ease discomfort. A small pillow placed on the abdomen may help relieve pain.    Something warm on your abdomen (such as a heating pad) may help, but be careful not to burn yourself.  Diet    Don t force yourself to eat, especially if having cramps, vomiting, or diarrhea.    Water is important so you don't get dehydrated. Soup may also be good. Sports drinks may also help, especially if they are not too acidic. Don't drink sugary drinks as this can make things worse. Take liquids in small amounts. Don t guzzle them.    Caffeine sometimes makes the pain and cramping worse.    Don t take dairy products if you have vomiting or diarrhea.    Don't eat large amounts at a time. Wait a few minutes between bites.    Eat a diet low in fiber (called a  low-residue diet). Foods allowed include refined breads, white rice, fruit and vegetable juices without pulp, tender meats. These foods will pass more easily through the intestine.    Don t have whole-grain foods, whole fruits and vegetables, meats, seeds and nuts, fried or fatty foods, dairy, alcohol and spicy foods until your symptoms go away.  Follow-up care  Follow up with your healthcare provider, or as advised, if your pain does not begin to improve in the next 24 hours.  Call 911  Call 911 if any of these occur:    Trouble breathing    Confusion    Fainting or loss of consciousness    Rapid heart rate    Seizure  When to seek medical advice  Call your healthcare provider right away if any of these occur:    Pain gets worse or moves to the right lower abdomen    New or worsening vomiting or diarrhea    Swelling of the abdomen    Unable to pass stool for more than 3 days    Fever of 100.4 F (38 C) or higher, or as directed by your healthcare provider.    Blood in vomit or bowel movements (dark red or black color)    Yellow color of eyes and skin (jaundice)    Weakness, dizziness    Chest, arm, back, neck, or jaw pain    Unexpected vaginal bleeding or missed period    Can't keep down liquids or water and you are getting dehydrated  [employer] last reviewed this educational content on 6/1/2018 2000-2020 [employer]. [address] [place] [address], [place] [address]. All rights reserved. This information is not intended as a substitute for professional medical care. Always follow your healthcare professional's instructions.

## 2020-11-27 NOTE — PROGRESS NOTES
Subjective     Kimberley Choe is a 40 year old female who presents to clinic today for the following health issues:    HPI         Pain  Onset: since summer    Description:   Location: RUQ pain, radiates to middle back, shoulder, right side    Progression of Symptoms: worse    Accompanying Signs & Symptoms: nausea, heart burn  Other symptoms: worse after eating- lasts a few minutes to an hour.     Precipitating factors:   Trauma or overuse: no   Therapies Tried and outcome: none  Denies ETOH use in last 3-4 months, no chance of pregnancy.  Did raise the head of her bed the last few days.    Review of Systems   Constitutional, HEENT, cardiovascular, pulmonary, GI, , musculoskeletal, neuro, skin, endocrine and psych systems are negative, except as otherwise noted.      Objective    /78   Pulse 73   Temp 97.1  F (36.2  C) (Tympanic)   Resp 16   Wt 69.3 kg (152 lb 12.8 oz)   LMP 11/13/2020   SpO2 98%   BMI 26.23 kg/m    Body mass index is 26.23 kg/m .  Physical Exam   GENERAL: healthy, alert and no distress  RESP: lungs clear to auscultation - no rales, rhonchi or wheezes  CV: regular rate and rhythm, normal S1 S2, no S3 or S4, no murmur, click or rub, no peripheral edema and peripheral pulses strong  ABDOMEN: soft, no hepatosplenomegaly, no masses and bowel sounds normal POSITIVE PUQ tenderness with palpation, no rebound tenderness  MS: no gross musculoskeletal defects noted, no edema, no CVA tenderness  PSYCH: mentation appears normal, affect normal/bright    See orders        Assessment & Plan     Kimberley was seen today for pain.    Diagnoses and all orders for this visit:    RUQ abdominal pain  -     Comprehensive metabolic panel  -     CBC with platelets and differential  -     Cancel: US Appendix Only; Future  -     omeprazole (PRILOSEC) 20 MG DR capsule; Take 1 capsule (20 mg) by mouth daily  -     US Abdomen Limited; Future            See Patient Instructions: advised trial of omeprazole.  We  will let you know lab and imaging results when we get them back and if abnormal, a treatment plan.  Follow up if worsening symptoms discussed.     Return in about 1 week (around 12/4/2020), or if symptoms worsen or fail to improve.    TERI Joe  [facility] [place]

## 2020-11-29 ENCOUNTER — HEALTH MAINTENANCE LETTER (OUTPATIENT)
Age: 40
End: 2020-11-29

## 2020-12-02 ENCOUNTER — ANCILLARY PROCEDURE (OUTPATIENT)
Dept: ULTRASOUND IMAGING | Facility: CLINIC | Age: 40
End: 2020-12-02
Attending: NURSE PRACTITIONER
Payer: COMMERCIAL

## 2020-12-02 DIAGNOSIS — R10.11 RUQ ABDOMINAL PAIN: ICD-10-CM

## 2020-12-04 ENCOUNTER — MYC MEDICAL ADVICE (OUTPATIENT)
Dept: FAMILY MEDICINE | Facility: CLINIC | Age: 40
End: 2020-12-04

## 2020-12-07 NOTE — RESULT ENCOUNTER NOTE
Waldo Chu,    Thank you for your recent office visit.    Here are your recent results.  Per radiology- IMPRESSION:  1.  Diffuse fatty infiltration of the liver.  2.  Otherwise unremarkable right upper quadrant ultrasound. No  gallstones are seen.    Follow up as needed.  Feel free to contact me via [employer] or call the clinic at [phone].    Sincerely,    Sada Golden, MING, FNP-BC

## 2020-12-11 ENCOUNTER — OFFICE VISIT (OUTPATIENT)
Dept: FAMILY MEDICINE | Facility: CLINIC | Age: 40
End: 2020-12-11
Payer: COMMERCIAL

## 2020-12-11 VITALS
BODY MASS INDEX: 25.4 KG/M2 | SYSTOLIC BLOOD PRESSURE: 128 MMHG | OXYGEN SATURATION: 97 % | RESPIRATION RATE: 18 BRPM | WEIGHT: 148 LBS | HEART RATE: 73 BPM | TEMPERATURE: 97.6 F | DIASTOLIC BLOOD PRESSURE: 85 MMHG

## 2020-12-11 DIAGNOSIS — Z01.818 PREOP GENERAL PHYSICAL EXAM: Primary | ICD-10-CM

## 2020-12-11 DIAGNOSIS — M77.32 HEEL SPUR, LEFT: ICD-10-CM

## 2020-12-11 DIAGNOSIS — M76.60 ACHILLES TENDON PAIN: ICD-10-CM

## 2020-12-11 PROCEDURE — 99214 OFFICE O/P EST MOD 30 MIN: CPT | Performed by: FAMILY MEDICINE

## 2020-12-11 NOTE — PROGRESS NOTES
[facility] [place]  [address]  [place] [address]  [phone]  Dept: [phone]    PRE-OP EVALUATION:  Today's date: 2020    Kimberley Choe (: 1980) presents for pre-operative evaluation assessment as requested by Dr. Michael Godoy.  She requires evaluation and anesthesia risk assessment prior to undergoing surgery/procedure for treatment of chronic left heel pain with a heel spur and achilles tendon pain.     Proposed Surgery/ Procedure: left achillis tendon debridement bone spur excision with tendon reattachement with speed bridge   Date of Surgery/ Procedure: 20  Time of Surgery/ Procedure: [facility]   Hospital/Surgical Facility: [facility]  Surgery Fax Number: [phone]  Primary Physician: Coco Lepe  Type of Anesthesia Anticipated: General    Preoperative Questionnaire:   No - Have you ever had a heart attack or stroke?  No - Have you ever had surgery on your heart or blood vessels, such as a stent, coronary (heart) bypass, or surgery on an artery in the head, neck, heart, or legs?  No - Do you have chest pain when you are physically active?  No - Do you have a history of heart failure?  No - Do you currently have a cold, bronchitis, or symptoms of other respiratory (head and chest) infections?  No - Do you have a cough, shortness of breath, or wheezing?  No - Do you or anyone in your family have a history of blood clots?  No - Do you or anyone in your family have a serious bleeding problem, such as long-lasting bleeding after surgeries or cuts?  No - Have you ever had anemia or been told to take iron pills?  YES - HAVE YOU EVERY HAD ANEMIA OR BEEN TOLD TO TAKE IRON PILLS? Iron infusion Q3-4 years   No - Have you ever had a blood transfusion?  Yes - Are you willing to have a blood transfusion if it is medically needed before, during, or after your surgery?  No - Have you or anyone in your family ever had problems with anesthesia  (sedation for surgery)?  No - Do you have sleep apnea, excessive snoring, or daytime drowsiness?   No - Do you have any artifical heart valves or other implanted medical devices, such as a pacemaker, defibrillator, or continuous glucose monitor?  No - Do you have any artifical joints?  No - Are you allergic to latex?  No - Is there any chance that you may be pregnant?    Patient has a Health Care Directive or Living Will:  NO    HPI:     HPI related to upcoming procedure:     40 year old pleasant female patient of mine here for a Pre-op exam.     Patient reports that she's had chronic left heel pain with posterior heel spur and achilles tendon pain. Was seen and evaluated by Ortho and recommended to undergo left achillis tendon debridement bone spur excision with tendon reattachement with speed bridge. Patient states that she understands the risks and benefits of the procedure and wishes to proceed.     HYPOTHYROIDISM - Patient has a longstanding history of chronic Hypothyroidism. Patient has been doing well, noting no tremor, insomnia, hair loss or changes in skin texture. Continues to take medications as directed, without adverse reactions or side effects. Last TSH   Lab Results   Component Value Date    TSH 2.95 10/07/2020     Otherwise well and has no concerns or recent illnesses.     See problem list for active medical problems.  Problems all longstanding and stable, except as noted/documented.  See ROS for pertinent symptoms related to these conditions.      MEDICAL HISTORY:     Patient Active Problem List    Diagnosis Date Noted     Rapid heart beat 06/12/2018     Priority: Medium     Iron deficiency anemia, unspecified iron deficiency anemia type 12/13/2017     Priority: Medium     Fatty liver disease, nonalcoholic 10/03/2013     Priority: Medium     Joint pains 02/22/2013     Priority: Medium     Celiac disease 11/10/2010     Priority: Medium     CARDIOVASCULAR SCREENING; LDL GOAL LESS THAN 160 10/31/2010      Priority: Medium     Hip pain 09/13/2010     Priority: Medium     Anemia 06/17/2010     Priority: Medium     Better after diagnosis of celiac disease       Family history of congenital heart disease 08/29/2009     Priority: Medium     Overview:   evaluated  with  stress and  echo  2007       Vitamin D deficiency 06/23/2009     Priority: Medium     Undiagnosed cardiac murmurs 03/07/2008     Priority: Medium     Echo ok.  No prophylactic antibiotics required       Hypothyroidism 12/20/2005     Priority: Medium     S/p thyroidectomy 2002        Past Medical History:   Diagnosis Date     Anemia, unspecified     with pregnancy 6/06     Celiac disease      Dysmenorrhea 2/1/2012     GERD (gastroesophageal reflux disease) 1/30/2014     Papanicolaou smear of cervix with low grade squamous intraepithelial lesion (LGSIL) 7/09    Colpo negative     Right hip labral tear 8/24/2006     Unspecified closed fracture of ankle     Left foot     Unspecified disorder of thyroid 10/20/2002    Thyroid disease, goiter nodules, S/p thyroidectomy 2002     Past Surgical History:   Procedure Laterality Date     C HIP ARTHROSCOPY, DX  6/08    (R) Anterior superior labral debridement.      ESOPHAGOSCOPY, GASTROSCOPY, DUODENOSCOPY (EGD), COMBINED  11/1/2010    EGD     THYROIDECTOMY   2002    S/p thyroidectomy 2002     Current Outpatient Medications   Medication Sig Dispense Refill     EUTHYROX 150 MCG tablet Take 1 tablet by mouth once daily 90 tablet 3     omeprazole (PRILOSEC) 20 MG DR capsule Take 1 capsule (20 mg) by mouth daily 30 capsule 1     VITAMIN D, CHOLECALCIFEROL, PO Take 1,000 Units by mouth daily       OTC products: None, except as noted above    Allergies   Allergen Reactions     Gluten Other (See Comments)     Celiac disease     Macrobid [Nitrofuran Derivatives] Nausea and Vomiting     Zithromax [Azithromycin Dihydrate] Nausea and Vomiting      Latex Allergy: NO    Social History     Tobacco Use     Smoking status: Never  Smoker     Smokeless tobacco: Never Used   Substance Use Topics     Alcohol use: Yes     Comment: rare     History   Drug Use No       REVIEW OF SYSTEMS:   Constitutional, neuro, ENT, endocrine, pulmonary, cardiac, gastrointestinal, genitourinary, musculoskeletal, integument and psychiatric systems are negative, except as otherwise noted.    EXAM:   /85   Pulse 73   Temp 97.6  F (36.4  C) (Tympanic)   Resp 18   Wt 67.1 kg (148 lb)   LMP 11/20/2020   SpO2 97%   Breastfeeding No   BMI 25.40 kg/m      GENERAL APPEARANCE: healthy, alert and no distress     EYES: EOMI, PERRL     HENT: ear canals and TM's normal and nose and mouth without ulcers or lesions     NECK: no adenopathy, no asymmetry, masses, or scars and thyroid normal to palpation     RESP: lungs clear to auscultation - no rales, rhonchi or wheezes     CV: regular rates and rhythm, normal S1 S2, no S3 or S4 and no murmur, click or rub     ABDOMEN:  soft, nontender, no HSM or masses and bowel sounds normal     MS: extremities normal- no gross deformities noted, no evidence of inflammation in joints, FROM in all extremities. Bony prominence on the left posterior heel.      SKIN: no suspicious lesions or rashes     NEURO: Normal strength and tone, sensory exam grossly normal, mentation intact and speech normal     PSYCH: mentation appears normal. and affect normal/bright     LYMPHATICS: No cervical adenopathy    DIAGNOSTICS:   No labs or EKG required for low risk surgery (cataract, skin procedure, breast biopsy, etc)    Recent Labs   Lab Test 11/27/20  0825 02/20/20  0902 04/07/15  1046 04/07/15  1046 03/11/14  0857   HGB 14.3 13.9   < > 15.0 14.7    109*   < > 204 176   INR  --   --   --  1.03 1.04    139   < > 140 142   POTASSIUM 4.1 4.0   < > 4.4 4.2   CR 0.77 0.72   < > 0.66 0.72    < > = values in this interval not displayed.        IMPRESSION:   Reason for surgery/procedure: Left achillis tendon debridement bone spur excision with  tendon reattachement with speed bridge   Diagnosis/reason for consult: Chronic left heel pain with a heel spur and achilles tendon pain.       The proposed surgical procedure is considered INTERMEDIATE risk.    REVISED CARDIAC RISK INDEX  The patient has the following serious cardiovascular risks for perioperative complications such as (MI, PE, VFib and 3  AV Block):  No serious cardiac risks  INTERPRETATION: 0 risks: Class I (very low risk - 0.4% complication rate)    The patient has the following additional risks for perioperative complications:  No identified additional risks      ICD-10-CM    1. Preop general physical exam  Z01.818    2. Achilles tendon pain  M76.60    3. Heel spur, left  M77.32        RECOMMENDATIONS:       Cardiovascular Risk  Performs 4 METs exercise without symptoms (Light housework (dusting, washing dishes), Climb a flight of stairs and Walk on level ground at 15 minutes per mile (4 miles/hour)) .       Pulmonary Risk  None identified.       --Patient is to take all scheduled medications on the day of surgery    APPROVAL GIVEN to proceed with proposed procedure, without further diagnostic evaluation       Signed Electronically by: Coco Lepe MD    Copy of this evaluation report is provided to requesting physician.    [facility] Preop Guidelines    Revised Cardiac Risk Index

## 2020-12-11 NOTE — PATIENT INSTRUCTIONS

## 2021-01-11 ENCOUNTER — E-VISIT (OUTPATIENT)
Dept: FAMILY MEDICINE | Facility: CLINIC | Age: 41
End: 2021-01-11
Payer: COMMERCIAL

## 2021-01-11 ENCOUNTER — VIRTUAL VISIT (OUTPATIENT)
Dept: FAMILY MEDICINE | Facility: CLINIC | Age: 41
End: 2021-01-11
Payer: COMMERCIAL

## 2021-01-11 DIAGNOSIS — Z53.9 DIAGNOSIS NOT YET DEFINED: Primary | ICD-10-CM

## 2021-01-11 DIAGNOSIS — F43.22 ADJUSTMENT DISORDER WITH ANXIOUS MOOD: Primary | ICD-10-CM

## 2021-01-11 PROCEDURE — 99213 OFFICE O/P EST LOW 20 MIN: CPT | Mod: TEL | Performed by: FAMILY MEDICINE

## 2021-01-11 RX ORDER — OXYCODONE HYDROCHLORIDE 5 MG/1
TABLET ORAL
COMMUNITY
Start: 2021-01-06 | End: 2021-03-12

## 2021-01-11 RX ORDER — IBUPROFEN 200 MG
200 TABLET ORAL EVERY 4 HOURS PRN
COMMUNITY
End: 2021-03-12

## 2021-01-11 ASSESSMENT — ANXIETY QUESTIONNAIRES
1. FEELING NERVOUS, ANXIOUS, OR ON EDGE: SEVERAL DAYS
4. TROUBLE RELAXING: SEVERAL DAYS
GAD7 TOTAL SCORE: 7
5. BEING SO RESTLESS THAT IT IS HARD TO SIT STILL: SEVERAL DAYS
2. NOT BEING ABLE TO STOP OR CONTROL WORRYING: SEVERAL DAYS
7. FEELING AFRAID AS IF SOMETHING AWFUL MIGHT HAPPEN: NOT AT ALL
7. FEELING AFRAID AS IF SOMETHING AWFUL MIGHT HAPPEN: NOT AT ALL
6. BECOMING EASILY ANNOYED OR IRRITABLE: MORE THAN HALF THE DAYS
GAD7 TOTAL SCORE: 7
3. WORRYING TOO MUCH ABOUT DIFFERENT THINGS: SEVERAL DAYS

## 2021-01-11 ASSESSMENT — PATIENT HEALTH QUESTIONNAIRE - PHQ9: SUM OF ALL RESPONSES TO PHQ QUESTIONS 1-9: 7

## 2021-01-11 NOTE — PATIENT INSTRUCTIONS
Thank you for choosing us for your care. I think an in-clinic visit would be best next steps based on your symptoms. Please schedule a clinic appointment; you won t be charged for this eVisit.      You can schedule an appointment right here in [facility], or call [phone]

## 2021-01-11 NOTE — PATIENT INSTRUCTIONS
Patient Education     Understanding Adjustment Disorders  Most people have stress in their lives, and sometimes you may have more than you can handle. You may find it hard to cope with a stressful event. As a result, you may become anxious and depressed. You might even get sick. These can be symptoms of an adjustment disorder. But you don t have to suffer. Ask your healthcare provider or mental health professional for help.     Symptoms of adjustment disorders  Symptoms of adjustment disorders include:    Hopelessness    Frequent crying    Depressed mood    Trembling or twitching    Fast, pounding, or fluttering heartbeat (palpitations)    Health problems    Withdrawal from social contacts and situations    Anxiety or tension    Sleeping too much or too little  What is an adjustment disorder?  Adjustment disorders sometimes occur when life gets to be too much. They often appear within 3 months of a stressful time. The symptoms vary widely. You might pretend the stressful event never happened. Or you might think about it so much you can t eat or sleep. In most cases, your feelings may seem beyond your control.   What causes it?  The events that trigger an adjustment disorder vary from person to person. Adults may be troubled by work, money, or marriage problems. Teens are often more likely bothered by school or conflict with parents. They also may find it hard to cope with a divorce or sexual issues. The death of a loved one can be especially hard to face for all family members. So can major life changes such as a move. Poverty or a lack of social skills may make matters worse.   What can be done?  Adjustment disorders can almost always be helped by therapy. You may feel relieved just to talk to someone. In some cases, only you and your therapist will meet. In others, your whole family may be involved. You might also join a group for people with this disorder. The support and concern of others can help you recover  more quickly.   [employer] last reviewed this educational content on 12/1/2019 2000-2020 [employer]. [address] [place] [address], [place] [address]. All rights reserved. This information is not intended as a substitute for professional medical care. Always follow your healthcare professional's instructions.

## 2021-01-11 NOTE — PROGRESS NOTES
Kimberley is a 40 year old who is being evaluated via a billable telephone visit.      What phone number would you like to be contacted at? [phone]  How would you like to obtain your AVS? [place]      Gareth Chu is a 40 year old who presents to clinic today for the following health issues     HPI     Abnormal Mood Symptoms  Onset/Duration: a couple of days   Description: bedridden due to recent surgery (left achillis tendon debridement bone spur excision with tendon reattachement with speed bridge)- 1/6/2021  Depression (if yes, do PHQ-9): YES  Anxiety (if yes, do MARIAELENA-7): YES  Accompanying Signs & Symptoms:  Still participating in activities that you used to enjoy: unable to do so due to surgery.   Fatigue: YES- some   Irritability: no  Difficulty concentrating: YES  Changes in appetite: no  Problems with sleep: YES  Heart racing/beating fast: YES  Abnormally elevated, expansive, or irritable mood: YES- feeling more sensitive, feeling panicky   Persistently increased activity or energy: no  Thoughts of hurting yourself or others: no  History:  Recent stress or major life event: YES- surgery  Prior depression or anxiety: None  Family history of depression or anxiety: no  Alcohol/drug use: no  Difficulty sleeping: YES  Precipitating or alleviating factors: None  Therapies tried and outcome: none      PHQ 3/11/2019 1/11/2021   PHQ-9 Total Score 3 7   Q9: Thoughts of better off dead/self-harm past 2 weeks Not at all Not at all     MARIAELENA-7 SCORE 3/11/2019 1/11/2021   Total Score - 7 (mild anxiety)   Total Score 3 7         Review of Systems   Constitutional, HEENT, cardiovascular, pulmonary, gi and gu systems are negative, except as otherwise noted.      Objective           Vitals:  No vitals were obtained today due to virtual visit.    Physical Exam     PSYCH: Alert and oriented times 3; coherent speech, normal   rate and volume, able to articulate logical thoughts, able   to abstract reason, no  tangential thoughts, no hallucinations   or delusions  Her affect is normal  RESP: No cough, no audible wheezing, able to talk in full sentences  Remainder of exam unable to be completed due to telephone visits      Assessment & Plan     Adjustment disorder with anxious mood due to recent surgery  -  PHQ-9/MARIAELENA 7 completed, see above/Epic for details    - MENTAL HEALTH REFERRAL  - Adult; Outpatient Treatment; Individual/Couples/Family/Group Therapy/Health Psychology; [facility] [place] [facility] [phone]; We will contact you to schedule the appointment or please call with any questions.  - Encouraged to find a new routine during this time. Patient verbalized understanding and was appreciative for the advice.         Return in about 1 month (around 2/11/2021) for Follow up. sooner if needed.     Coco Lepe MD  [facility]NA        Phone call duration: 18  minutes

## 2021-01-12 ASSESSMENT — ANXIETY QUESTIONNAIRES: GAD7 TOTAL SCORE: 7

## 2021-02-24 ENCOUNTER — TRANSFERRED RECORDS (OUTPATIENT)
Dept: HEALTH INFORMATION MANAGEMENT | Facility: CLINIC | Age: 41
End: 2021-02-24

## 2021-03-09 NOTE — PROGRESS NOTES
"    Assessment & Plan     Advance care planning      Fatty liver disease, nonalcoholic    - Hepatic panel (Albumin, ALT, AST, Bili, Alk Phos, TP)    Hypothyroidism, unspecified type    - TSH with free T4 reflex    RLQ abdominal pain    - US Pelvic Complete with Transvaginal; Future    LLQ abdominal pain    - US Pelvic Complete with Transvaginal; Future    Amenorrhea    - HCG qualitative, Blood ([id number])  - US Pelvic Complete with Transvaginal; Future  - TSH with free T4 reflex    Family history of rheumatoid arthritis    - Rheumatoid factor      20 minutes spent on the date of the encounter doing chart review, history and exam, documentation and further activities as noted above       BMI:   Estimated body mass index is 25.75 kg/m  as calculated from the following:    Height as of this encounter: 1.626 m (5' 4\").    Weight as of this encounter: 68 kg (150 lb).   Weight management plan: Discussed healthy diet and exercise guidelines    See Patient Instructions: discussed we will let her know lab and imaging results. Follow up for persistent or worsening symptoms.     Return in about 4 weeks (around 4/9/2021), or if symptoms worsen or fail to improve.    Sada Golden, [facility] RAYNA Chu is a 40 year old who presents for the following health issues     HPI       Menstrual Concern  Onset/Duration: Mid January- no menses.  Has had less stress lately.  Had surgery on left ankle tendon/ bone spur.  No weight loss. Hx of thyroid issues but has never caused her nt to get her menses.  She reports hx of bone spurs and joint pain, her mother has similar sx and was recently diagnosed with RA; she would like labs to check for this. No change in bowel or bladder symptoms.   Description:         Cramping: off and on- mild  Therapies tried and outcome: na    Spot on left hand    First noticed: 1 week    Description : red spot on left hand    Number of: 1    Accompanying signs and symptoms: " "na    Therapies tried and outcome: na        Review of Systems   Constitutional, HEENT, cardiovascular, pulmonary, GI, , musculoskeletal, neuro, skin, endocrine and psych systems are negative, except as otherwise noted.      Objective    /81   Pulse 84   Temp 97.7  F (36.5  C) (Tympanic)   Resp 16   Ht 1.626 m (5' 4\")   Wt 68 kg (150 lb)   LMP 01/12/2021   SpO2 98%   BMI 25.75 kg/m    Body mass index is 25.75 kg/m .  Physical Exam   GENERAL: healthy, alert and no distress  RESP: lungs clear to auscultation - no rales, rhonchi or wheezes  CV: regular rate and rhythm, normal S1 S2, no S3 or S4, no murmur, click or rub, no peripheral edema and peripheral pulses strong  ABDOMEN: soft, no hepatosplenomegaly, no masses and bowel sounds normal POSITIVE for tenderness with palpation of RLQ and LLQ; no rebound tenderness  MS: no gross musculoskeletal defects noted, no edema, no CVA tenderness  SKIN: POSITIVE for small open skin lesion to left wrist.  Advised not to pick.  If dose not resolve or is acting weird- painful, bleeding, crusting, etc- come in and we can remove it for pathology.  Advised pt to take pick of moles for comparison for change every 3-6 months.   PSYCH: mentation appears normal, affect normal/bright    See orders- pelvic US, labs            "

## 2021-03-12 ENCOUNTER — OFFICE VISIT (OUTPATIENT)
Dept: FAMILY MEDICINE | Facility: CLINIC | Age: 41
End: 2021-03-12
Payer: COMMERCIAL

## 2021-03-12 VITALS
BODY MASS INDEX: 25.61 KG/M2 | DIASTOLIC BLOOD PRESSURE: 81 MMHG | TEMPERATURE: 97.7 F | SYSTOLIC BLOOD PRESSURE: 132 MMHG | WEIGHT: 150 LBS | OXYGEN SATURATION: 98 % | HEIGHT: 64 IN | RESPIRATION RATE: 16 BRPM | HEART RATE: 84 BPM

## 2021-03-12 DIAGNOSIS — R10.32 LLQ ABDOMINAL PAIN: ICD-10-CM

## 2021-03-12 DIAGNOSIS — Z71.89 ADVANCE CARE PLANNING: Primary | ICD-10-CM

## 2021-03-12 DIAGNOSIS — E03.9 HYPOTHYROIDISM, UNSPECIFIED TYPE: ICD-10-CM

## 2021-03-12 DIAGNOSIS — N91.2 AMENORRHEA: ICD-10-CM

## 2021-03-12 DIAGNOSIS — R10.31 RLQ ABDOMINAL PAIN: ICD-10-CM

## 2021-03-12 DIAGNOSIS — K76.0 FATTY LIVER DISEASE, NONALCOHOLIC: ICD-10-CM

## 2021-03-12 DIAGNOSIS — Z82.61 FAMILY HISTORY OF RHEUMATOID ARTHRITIS: ICD-10-CM

## 2021-03-12 LAB
ALBUMIN SERPL-MCNC: 4.4 G/DL (ref 3.4–5)
ALP SERPL-CCNC: 67 U/L (ref 40–150)
ALT SERPL W P-5'-P-CCNC: 54 U/L (ref 0–50)
AST SERPL W P-5'-P-CCNC: 21 U/L (ref 0–45)
BILIRUB DIRECT SERPL-MCNC: 0.2 MG/DL (ref 0–0.2)
BILIRUB SERPL-MCNC: 0.9 MG/DL (ref 0.2–1.3)
HCG SERPL QL: NEGATIVE
PROT SERPL-MCNC: 7.6 G/DL (ref 6.8–8.8)
T4 FREE SERPL-MCNC: 1.61 NG/DL (ref 0.76–1.46)
TSH SERPL DL<=0.005 MIU/L-ACNC: 0.37 MU/L (ref 0.4–4)

## 2021-03-12 PROCEDURE — 86431 RHEUMATOID FACTOR QUANT: CPT | Performed by: NURSE PRACTITIONER

## 2021-03-12 PROCEDURE — 84439 ASSAY OF FREE THYROXINE: CPT | Performed by: NURSE PRACTITIONER

## 2021-03-12 PROCEDURE — 99214 OFFICE O/P EST MOD 30 MIN: CPT | Performed by: NURSE PRACTITIONER

## 2021-03-12 PROCEDURE — 84443 ASSAY THYROID STIM HORMONE: CPT | Performed by: NURSE PRACTITIONER

## 2021-03-12 PROCEDURE — 80076 HEPATIC FUNCTION PANEL: CPT | Performed by: NURSE PRACTITIONER

## 2021-03-12 PROCEDURE — 84703 CHORIONIC GONADOTROPIN ASSAY: CPT | Performed by: NURSE PRACTITIONER

## 2021-03-12 PROCEDURE — 36415 COLL VENOUS BLD VENIPUNCTURE: CPT | Performed by: NURSE PRACTITIONER

## 2021-03-12 ASSESSMENT — MIFFLIN-ST. JEOR: SCORE: 1335.4

## 2021-03-12 NOTE — PATIENT INSTRUCTIONS
Patient Education     Amenorrhea     Normally, the uterine lining builds up and is shed each month during a woman s period. With amenorrhea, this process does not happen.   Menstruation occurs when a woman sheds the lining of her uterus. It is also called a  period.  For most women, this happens once a month. But some women may not get their periods. This is called amenorrhea.   Amenorrhea may be due to a number of causes. These include:    Pregnancy, breastfeeding, or menopause    Hormone problems    Emotional stress    Very low body weight    Exercising too much    Poor nutrition or eating disorders    Taking certain medicines    Having certain birth defects or genetic disorders  There are 2 types of amenorrhea:    Primary amenorrhea. This is when a girl has not had her first period by age 15.    Secondary amenorrhea. This is when:  ? A girl or woman who has been having normal periods stops getting them for 3 months in a row  ? A girl or woman who has been having irregular periods stops getting them for 6 months in a row  One or more tests can be done to find out why you re not having periods. These include blood tests and imaging tests. Once the cause is found, it may be treated. Treatments can range from lifestyle and diet changes to medicines, procedures, or surgery. Your healthcare provider will discuss options with you as needed.   Follow-up care  Follow up with your healthcare provider, or as advised. You'll be told the results of any tests as soon as they are ready.    Pregnancy  Note: Know that you can still become pregnant even if you are not having regular periods. It is important to use some form of birth control if you are sexually active and don't want to get pregnant.   When to get medical advice  Call your healthcare provider right way if any of these occur:    Severe pain in the belly (abdomen)    Belly swelling    Sudden, severe vaginal bleeding    Feeling faint or passing out  [employer] last  reviewed this educational content on 6/1/2020 2000-2020 [employer]. All rights reserved. This information is not intended as a substitute for professional medical care. Always follow your healthcare professional's instructions.

## 2021-03-15 LAB — RHEUMATOID FACT SER NEPH-ACNC: <7 IU/ML (ref 0–20)

## 2021-03-16 NOTE — RESULT ENCOUNTER NOTE
Waldo Chu,    Thank you for your recent office visit.    Here are your recent results.  Your ALT- liver function is slightly high.  Please refrain from alcohol and tylenol.  We should probably recheck labs in a week or two to see if this has improved.  Your TSH is just slightly below the norm, are you ok keeping your dose where it is?    Feel free to contact me via [employer] or call the clinic at [phone].    Sincerely,    MING Kevin, FNP-BC

## 2021-03-24 ENCOUNTER — ANCILLARY PROCEDURE (OUTPATIENT)
Dept: ULTRASOUND IMAGING | Facility: CLINIC | Age: 41
End: 2021-03-24
Attending: NURSE PRACTITIONER
Payer: COMMERCIAL

## 2021-03-24 DIAGNOSIS — R10.32 LLQ ABDOMINAL PAIN: ICD-10-CM

## 2021-03-24 DIAGNOSIS — N91.2 AMENORRHEA: ICD-10-CM

## 2021-03-24 DIAGNOSIS — R10.31 RLQ ABDOMINAL PAIN: ICD-10-CM

## 2021-03-25 NOTE — RESULT ENCOUNTER NOTE
Waldo Chu,    Thank you for your recent office visit.    Here are your recent results.  Normal pelvic ultrasound per radiology.  If you are still having pelvic pain, you should schedule with OBGYN for further evaluation of your symptoms.     Feel free to contact me via [place] or call the clinic at [phone].    Sincerely,    Sada Golden, APRN, FNP-BC

## 2021-05-25 ENCOUNTER — TRANSFERRED RECORDS (OUTPATIENT)
Dept: HEALTH INFORMATION MANAGEMENT | Facility: CLINIC | Age: 41
End: 2021-05-25

## 2021-06-03 ENCOUNTER — OFFICE VISIT (OUTPATIENT)
Dept: OBGYN | Facility: CLINIC | Age: 41
End: 2021-06-03
Payer: COMMERCIAL

## 2021-06-03 VITALS
WEIGHT: 137 LBS | TEMPERATURE: 98.4 F | SYSTOLIC BLOOD PRESSURE: 119 MMHG | HEART RATE: 93 BPM | RESPIRATION RATE: 18 BRPM | BODY MASS INDEX: 23.39 KG/M2 | DIASTOLIC BLOOD PRESSURE: 71 MMHG | HEIGHT: 64 IN

## 2021-06-03 DIAGNOSIS — R10.2 PELVIC PAIN IN FEMALE: ICD-10-CM

## 2021-06-03 PROCEDURE — 99204 OFFICE O/P NEW MOD 45 MIN: CPT | Performed by: OBSTETRICS & GYNECOLOGY

## 2021-06-03 RX ORDER — CEFAZOLIN SODIUM 2 G/100ML
2 INJECTION, SOLUTION INTRAVENOUS
Status: CANCELLED | OUTPATIENT
Start: 2021-06-03

## 2021-06-03 RX ORDER — OXYCODONE HCL 10 MG/1
10 TABLET, FILM COATED, EXTENDED RELEASE ORAL ONCE
Status: CANCELLED | OUTPATIENT
Start: 2021-06-03 | End: 2021-06-03

## 2021-06-03 RX ORDER — CEFAZOLIN SODIUM 2 G/100ML
2 INJECTION, SOLUTION INTRAVENOUS SEE ADMIN INSTRUCTIONS
Status: CANCELLED | OUTPATIENT
Start: 2021-06-03

## 2021-06-03 RX ORDER — GABAPENTIN 300 MG/1
300 CAPSULE ORAL ONCE
Status: CANCELLED | OUTPATIENT
Start: 2021-06-03 | End: 2021-06-03

## 2021-06-03 RX ORDER — ACETAMINOPHEN 325 MG/1
975 TABLET ORAL ONCE
Status: CANCELLED | OUTPATIENT
Start: 2021-06-03 | End: 2021-06-03

## 2021-06-03 ASSESSMENT — MIFFLIN-ST. JEOR: SCORE: 1276.43

## 2021-06-03 NOTE — PROGRESS NOTES
Kimberley is a 40 year old   female who presents for advice regarding a longstanding issue with recurring episodes of pelvic pain, cramping, can be quite severe at times; she states in the past 4-5 months it has been particularly bad, sometimes triggered by emptying her bladder; she has no urinary frquency, urgency or leakage; she skipped her menses in  and they may have been when things started to get worse, but she has had this pain in the past as well.  She has h/o  x 3, husb with vasectomy.  Menses are always crampy, and getting a bit heavier now.  She has episodic deep dyspareunia.  She has been on BCP in the past, but had elevated liver enzymes and had to stop.    She has had pelvic ultrasounds in the past, most recently 3/24/21, which was NORMAL..    Patient Active Problem List    Diagnosis Date Noted     Pelvic pain in female 2021     Priority: Medium     Iron deficiency anemia, unspecified iron deficiency anemia type 2017     Priority: Medium     Fatty liver disease, nonalcoholic 10/03/2013     Priority: Medium     Celiac disease 11/10/2010     Priority: Medium     CARDIOVASCULAR SCREENING; LDL GOAL LESS THAN 160 10/31/2010     Priority: Medium     Anemia 2010     Priority: Medium     Better after diagnosis of celiac disease       Family history of congenital heart disease 2009     Priority: Medium     Overview:   evaluated  with  stress and  echo         Vitamin D deficiency 2009     Priority: Medium     Undiagnosed cardiac murmurs 2008     Priority: Medium     Echo ok.  No prophylactic antibiotics required       Hypothyroidism 2005     Priority: Medium     S/p thyroidectomy          All systems were reviewed and pertinent information in noted in subjective/HPI.    Past Medical History:   Diagnosis Date     Anemia, unspecified     with pregnancy      Arthritis 2006    Right hip     Celiac disease      Dysmenorrhea 2012     GERD  (gastroesophageal reflux disease) 1/30/2014     Papanicolaou smear of cervix with low grade squamous intraepithelial lesion (LGSIL) 7/09    Colpo negative     Right hip labral tear 8/24/2006     Unspecified closed fracture of ankle     Left foot     Unspecified disorder of thyroid 10/20/2002    Thyroid disease, goiter nodules, S/p thyroidectomy 2002       Past Surgical History:   Procedure Laterality Date     BIOPSY  2014    Liver     C HIP ARTHROSCOPY, DX  6/08    (R) Anterior superior labral debridement.      ENT SURGERY  2000    Thyroidectomy     ESOPHAGOSCOPY, GASTROSCOPY, DUODENOSCOPY (EGD), COMBINED  11/1/2010    EGD     THYROIDECTOMY   2002    S/p thyroidectomy 2002         Current Outpatient Medications:      EUTHYROX 150 MCG tablet, Take 1 tablet by mouth once daily, Disp: 90 tablet, Rfl: 3    ALLERGIES:  Gluten, Macrobid [nitrofuran derivatives], and Zithromax [azithromycin dihydrate]    Social History     Socioeconomic History     Marital status:      Spouse name: None     Number of children: 2     Years of education: None     Highest education level: None   Occupational History     Occupation:      Employer: [employer]   Social Needs     Financial resource strain: None     Food insecurity     Worry: None     Inability: None     Transportation needs     Medical: None     Non-medical: None   Tobacco Use     Smoking status: Never Smoker     Smokeless tobacco: Never Used   Substance and Sexual Activity     Alcohol use: Yes     Comment: rare     Drug use: No     Sexual activity: Yes     Partners: Male     Birth control/protection: Male Surgical     Comment: Vasectomy   Lifestyle     Physical activity     Days per week: None     Minutes per session: None     Stress: None   Relationships     Social connections     Talks on phone: None     Gets together: None     Attends Worship service: None     Active member of club or organization: None     Attends meetings of clubs or  "organizations: None     Relationship status: None     Intimate partner violence     Fear of current or ex partner: None     Emotionally abused: None     Physically abused: None     Forced sexual activity: None   Other Topics Concern     Parent/sibling w/ CABG, MI or angioplasty before 65F 55M? No      Service Not Asked     Blood Transfusions Not Asked     Caffeine Concern Not Asked     Occupational Exposure Not Asked     Hobby Hazards Not Asked     Sleep Concern Not Asked     Stress Concern Not Asked     Weight Concern Not Asked     Special Diet Not Asked     Back Care Not Asked     Exercise Yes     Comment: ride horses     Bike Helmet Not Asked     Seat Belt Not Asked     Self-Exams Not Asked   Social History Narrative     None       Family History   Problem Relation Age of Onset     Diabetes Maternal Grandmother      Alcohol/Drug Maternal Grandmother      Gastrointestinal Disease Paternal Grandmother         IBS     Hypertension Paternal Grandmother      Cerebrovascular Disease Paternal Grandmother      Alzheimer Disease Paternal Grandmother      Hypertension Father      Arthritis Father      Heart Disease Father         cardimyopthy age 60, arrythmias     Lipids Father      Neurologic Disorder Sister         brain tumor     Gastrointestinal Disease Mother         celiac     C.A.D. No family hx of      Breast Cancer No family hx of      Cancer - colorectal No family hx of      Anesthesia Reaction No family hx of      Bleeding Disorder No family hx of        OBJECTIVE:  Vitals: /71 (BP Location: Right arm, Patient Position: Chair, Cuff Size: Adult Regular)   Pulse 93   Temp 98.4  F (36.9  C) (Tympanic)   Resp 18   Ht 1.626 m (5' 4\")   Wt 62.1 kg (137 lb)   LMP 05/13/2021   Breastfeeding No   BMI 23.52 kg/m   BMI= Body mass index is 23.52 kg/m .   Patient's last menstrual period was 05/13/2021.     GENERAL APPEARANCE: healthy, alert and no distress  ABDOMEN:  soft, nontender, no " hepato-splenomegaly or hernias  PELVIC:  EGBUS:  within normal limits  VAGINA:  normoestrogenic, well-supported, no unusual discharge; pelvic side walls and bladder area not point tender  CERVIX:  smooth, non-friable, no gross lesions, thin-layer PAP was not taken  UTERUS:  AV; pain triggered by lateral motion of the cervix as well as palpation of the uterosacral ligaments;   ADNEXAE:  Tender, but no masses    ASSESSMENT:      ICD-10-CM    1. Pelvic pain in female  R10.2        PLAN:  I discussed at length with Kimberley that her symptoms and exam suggest endometriosis, a hormonally sensitive condition that can cause pain and affect other viscera.  I discussed that imaging cannot easily discern endometriosis, but that laparoscopy can help for diagnosis, staging, potential treatment and future treatment planning, including use of GNRH agonists/antagonist, or future definitive surgery  She is amenable to proceeding with laparoscopy  Tasha Dewey MD  [facility]      Tasha Dewey MD

## 2021-06-03 NOTE — NURSING NOTE
"Initial /71 (BP Location: Right arm, Patient Position: Chair, Cuff Size: Adult Regular)   Pulse 93   Temp 98.4  F (36.9  C) (Tympanic)   Resp 18   Ht 1.626 m (5' 4\")   Wt 62.1 kg (137 lb)   LMP 05/13/2021   Breastfeeding No   BMI 23.52 kg/m   Estimated body mass index is 23.52 kg/m  as calculated from the following:    Height as of this encounter: 1.626 m (5' 4\").    Weight as of this encounter: 62.1 kg (137 lb). .      "

## 2021-06-04 ENCOUNTER — HOSPITAL ENCOUNTER (OUTPATIENT)
Facility: CLINIC | Age: 41
End: 2021-06-04
Attending: OBSTETRICS & GYNECOLOGY | Admitting: OBSTETRICS & GYNECOLOGY
Payer: COMMERCIAL

## 2021-06-04 ENCOUNTER — TELEPHONE (OUTPATIENT)
Dept: OBGYN | Facility: CLINIC | Age: 41
End: 2021-06-04

## 2021-06-04 NOTE — TELEPHONE ENCOUNTER
"[id number]  Kimberley Choe    You are now scheduled for surgery at [facility].  Below are the details for your surgery.  Please read the \"Preparing for Your Surgery\" instructions and let us know if you have any questions.    Type of surgery: Diagnostic laparoscopy  Surgeon:  Tasha Dewey MD  Location of surgery: [facility] OR    Date of surgery: 7-20-21    Time: 9:20am   Arrival Time: 8:20am    Time can change, to be confirmed a couple of days prior by pre-op surgery nurse.    Pre-Op Appt Date: Patient to schedule with a PCP or Family Practice Provider within 30 days to the surgery.  Post-Op Appt Date: To be determined by provider     COVID test 2-4 days prior at [facility]  Date 7-18-21  Time 3:00pm    Packet sent out: Yes  Pre-cert/Authorization completed:  TBD by Financial Securing Office.   MA Sterilization/Hysterectomy Acknowledgment Consent signed: Not Applicable    [facility]  [phone]    Fax: [phone]  Same Day Surgery [phone]  Fax: [phone]  [facility] [phone]    "

## 2021-06-08 DIAGNOSIS — Z11.59 ENCOUNTER FOR SCREENING FOR OTHER VIRAL DISEASES: ICD-10-CM

## 2021-07-07 ENCOUNTER — OFFICE VISIT (OUTPATIENT)
Dept: FAMILY MEDICINE | Facility: CLINIC | Age: 41
End: 2021-07-07
Payer: COMMERCIAL

## 2021-07-07 VITALS
HEART RATE: 80 BPM | OXYGEN SATURATION: 97 % | WEIGHT: 137 LBS | SYSTOLIC BLOOD PRESSURE: 112 MMHG | TEMPERATURE: 97.6 F | DIASTOLIC BLOOD PRESSURE: 77 MMHG | RESPIRATION RATE: 20 BRPM | BODY MASS INDEX: 23.52 KG/M2

## 2021-07-07 DIAGNOSIS — E03.9 HYPOTHYROIDISM, UNSPECIFIED TYPE: ICD-10-CM

## 2021-07-07 DIAGNOSIS — D50.9 IRON DEFICIENCY ANEMIA, UNSPECIFIED IRON DEFICIENCY ANEMIA TYPE: ICD-10-CM

## 2021-07-07 DIAGNOSIS — R10.2 PELVIC PAIN IN FEMALE: ICD-10-CM

## 2021-07-07 DIAGNOSIS — Z01.818 PREOP GENERAL PHYSICAL EXAM: Primary | ICD-10-CM

## 2021-07-07 LAB
ERYTHROCYTE [DISTWIDTH] IN BLOOD BY AUTOMATED COUNT: 14.2 % (ref 10–15)
FERRITIN SERPL-MCNC: 10 NG/ML (ref 12–150)
HCT VFR BLD AUTO: 42.6 % (ref 35–47)
HGB BLD-MCNC: 14.3 G/DL (ref 11.7–15.7)
MCH RBC QN AUTO: 27.8 PG (ref 26.5–33)
MCHC RBC AUTO-ENTMCNC: 33.6 G/DL (ref 31.5–36.5)
MCV RBC AUTO: 83 FL (ref 78–100)
PLATELET # BLD AUTO: 236 10E9/L (ref 150–450)
RBC # BLD AUTO: 5.15 10E12/L (ref 3.8–5.2)
TSH SERPL DL<=0.005 MIU/L-ACNC: 2.52 MU/L (ref 0.4–4)
WBC # BLD AUTO: 5.8 10E9/L (ref 4–11)

## 2021-07-07 PROCEDURE — 82728 ASSAY OF FERRITIN: CPT | Performed by: PHYSICIAN ASSISTANT

## 2021-07-07 PROCEDURE — 84443 ASSAY THYROID STIM HORMONE: CPT | Performed by: PHYSICIAN ASSISTANT

## 2021-07-07 PROCEDURE — 85027 COMPLETE CBC AUTOMATED: CPT | Performed by: PHYSICIAN ASSISTANT

## 2021-07-07 PROCEDURE — 36415 COLL VENOUS BLD VENIPUNCTURE: CPT | Performed by: PHYSICIAN ASSISTANT

## 2021-07-07 PROCEDURE — 99214 OFFICE O/P EST MOD 30 MIN: CPT | Performed by: PHYSICIAN ASSISTANT

## 2021-07-07 NOTE — PATIENT INSTRUCTIONS

## 2021-07-07 NOTE — PROGRESS NOTES
[facility] [place]  [address]  [place] [address]  Phone: [phone]  Primary Provider: Coco Lepe  Pre-op Performing Provider: REBECCA AGOSTO      PREOPERATIVE EVALUATION:  Today's date: 7/7/2021    Kimberley Choe is a 40 year old female who presents for a preoperative evaluation.    Surgical Information:  Surgery/Procedure: Diagnostic laparoscopy  Surgery Location: [facility]  Surgeon: Tasha Dewey MD  Surgery Date: 07/20/21  Time of Surgery: 9:20am  Where patient plans to recover: At home with family  Fax number for surgical facility: Note does not need to be faxed, will be available electronically in Epic.    Type of Anesthesia Anticipated: General    Assessment & Plan     The proposed surgical procedure is considered INTERMEDIATE risk.    1. Preop general physical exam    2. Pelvic pain in female    3. Hypothyroidism, unspecified type    4. Iron deficiency anemia, unspecified iron deficiency anemia type          Risks and Recommendations:  The patient has the following additional risks and recommendations for perioperative complications:   - No identified additional risk factors other than previously addressed    Medication Instructions:  Patient is to take all scheduled medications on the day of surgery    RECOMMENDATION:  APPROVAL GIVEN to proceed with proposed procedure, without further diagnostic evaluation.    Review of the result(s) of each unique test - labs          Subjective     HPI related to upcoming procedure: Pelvic pain    Preop Questions 7/7/2021   1. Have you ever had a heart attack or stroke? No   2. Have you ever had surgery on your heart or blood vessels, such as a stent placement, a coronary artery bypass, or surgery on an artery in your head, neck, heart, or legs? No   3. Do you have chest pain with activity? No   4. Do you have a history of  heart failure? No   5. Do you currently have a cold, bronchitis or symptoms of other  infection? No   6. Do you have a cough, shortness of breath, or wheezing? No   7. Do you or anyone in your family have previous history of blood clots? No   8. Do you or does anyone in your family have a serious bleeding problem such as prolonged bleeding following surgeries or cuts? No   9. Have you ever had problems with anemia or been told to take iron pills? YES - 3-4 years ago, had iron infusion    10. Have you had any abnormal blood loss such as black, tarry or bloody stools, or abnormal vaginal bleeding? YES - Vaginal bleeding   11. Have you ever had a blood transfusion? No   12. Are you willing to have a blood transfusion if it is medically needed before, during, or after your surgery? Yes   13. Have you or any of your relatives ever had problems with anesthesia? No   14. Do you have sleep apnea, excessive snoring or daytime drowsiness? No   15. Do you have any artifical heart valves or other implanted medical devices like a pacemaker, defibrillator, or continuous glucose monitor? No   16. Do you have artificial joints? No   17. Are you allergic to latex? No   18. Is there any chance that you may be pregnant? No       Health Care Directive:  Patient does not have a Health Care Directive or Living Will: Discussed advance care planning with patient; however, patient declined at this time.    Preoperative Review of :   reviewed - controlled substances prescribed by other outside provider(s).      Status of Chronic Conditions:  See problem list for active medical problems.  Problems all longstanding and stable, except as noted/documented.  See ROS for pertinent symptoms related to these conditions.      Review of Systems  Constitutional, neuro, ENT, endocrine, pulmonary, cardiac, gastrointestinal, genitourinary, musculoskeletal, integument and psychiatric systems are negative, except as otherwise noted.    Patient Active Problem List    Diagnosis Date Noted     Pelvic pain in female 06/03/2021      Priority: Medium     Iron deficiency anemia, unspecified iron deficiency anemia type 12/13/2017     Priority: Medium     Fatty liver disease, nonalcoholic 10/03/2013     Priority: Medium     Celiac disease 11/10/2010     Priority: Medium     CARDIOVASCULAR SCREENING; LDL GOAL LESS THAN 160 10/31/2010     Priority: Medium     Anemia 06/17/2010     Priority: Medium     Better after diagnosis of celiac disease       Family history of congenital heart disease 08/29/2009     Priority: Medium     Overview:   evaluated  with  stress and  echo  2007       Vitamin D deficiency 06/23/2009     Priority: Medium     Undiagnosed cardiac murmurs 03/07/2008     Priority: Medium     Echo ok.  No prophylactic antibiotics required       Hypothyroidism 12/20/2005     Priority: Medium     S/p thyroidectomy 2002        Past Medical History:   Diagnosis Date     Anemia, unspecified     with pregnancy 6/06     Arthritis 2006    Right hip     Celiac disease      Dysmenorrhea 2/1/2012     GERD (gastroesophageal reflux disease) 1/30/2014     Papanicolaou smear of cervix with low grade squamous intraepithelial lesion (LGSIL) 7/09    Colpo negative     Right hip labral tear 8/24/2006     Unspecified closed fracture of ankle     Left foot     Unspecified disorder of thyroid 10/20/2002    Thyroid disease, goiter nodules, S/p thyroidectomy 2002     Past Surgical History:   Procedure Laterality Date     BIOPSY  2014    Liver     C HIP ARTHROSCOPY, DX  6/08    (R) Anterior superior labral debridement.      ENT SURGERY  2000    Thyroidectomy     ESOPHAGOSCOPY, GASTROSCOPY, DUODENOSCOPY (EGD), COMBINED  11/1/2010    EGD     THYROIDECTOMY   2002    S/p thyroidectomy 2002     Current Outpatient Medications   Medication Sig Dispense Refill     EUTHYROX 150 MCG tablet Take 1 tablet by mouth once daily 90 tablet 3       Allergies   Allergen Reactions     Gluten Other (See Comments)     Celiac disease     Macrobid [Nitrofuran Derivatives] Nausea and  Vomiting     Zithromax [Azithromycin Dihydrate] Nausea and Vomiting        Social History     Tobacco Use     Smoking status: Never Smoker     Smokeless tobacco: Never Used   Substance Use Topics     Alcohol use: Yes     Comment: rare     Family History   Problem Relation Age of Onset     Diabetes Maternal Grandmother      Alcohol/Drug Maternal Grandmother      Gastrointestinal Disease Paternal Grandmother         IBS     Hypertension Paternal Grandmother      Cerebrovascular Disease Paternal Grandmother      Alzheimer Disease Paternal Grandmother      Hypertension Father      Arthritis Father      Heart Disease Father         cardimyopthy age 60, arrythmias     Lipids Father      Neurologic Disorder Sister         brain tumor     Gastrointestinal Disease Mother         celiac     C.A.D. No family hx of      Breast Cancer No family hx of      Cancer - colorectal No family hx of      Anesthesia Reaction No family hx of      Bleeding Disorder No family hx of      History   Drug Use No         Objective     /77   Pulse 80   Temp 97.6  F (36.4  C) (Tympanic)   Resp 20   Wt 62.1 kg (137 lb)   SpO2 97%   BMI 23.52 kg/m      Physical Exam    GENERAL APPEARANCE: healthy, alert and no distress     EYES: EOMI, PERRL     HENT: ear canals and TM's normal and nose and mouth without ulcers or lesions     NECK: no adenopathy, no asymmetry, masses, or scars and thyroid normal to palpation     RESP: lungs clear to auscultation - no rales, rhonchi or wheezes     CV: regular rates and rhythm, normal S1 S2, no S3 or S4 and no murmur, click or rub     ABDOMEN:  soft, nontender, no HSM or masses and bowel sounds normal     MS: extremities normal- no gross deformities noted, no evidence of inflammation in joints, FROM in all extremities.     SKIN: no suspicious lesions or rashes     NEURO: Normal strength and tone, sensory exam grossly normal, mentation intact and speech normal     PSYCH: mentation appears normal. and affect  normal/bright     LYMPHATICS: No cervical adenopathy    Recent Labs   Lab Test 11/27/20  0825 02/20/20  0902   HGB 14.3 13.9    109*    139   POTASSIUM 4.1 4.0   CR 0.77 0.72        Diagnostics:  Recent Results (from the past 24 hour(s))   TSH with free T4 reflex    Collection Time: 07/07/21  9:30 AM   Result Value Ref Range    TSH 2.52 0.40 - 4.00 mU/L   CBC with platelets    Collection Time: 07/07/21  9:30 AM   Result Value Ref Range    WBC 5.8 4.0 - 11.0 10e9/L    RBC Count 5.15 3.8 - 5.2 10e12/L    Hemoglobin 14.3 11.7 - 15.7 g/dL    Hematocrit 42.6 35.0 - 47.0 %    MCV 83 78 - 100 fl    MCH 27.8 26.5 - 33.0 pg    MCHC 33.6 31.5 - 36.5 g/dL    RDW 14.2 10.0 - 15.0 %    Platelet Count 236 150 - 450 10e9/L   Ferritin    Collection Time: 07/07/21  9:30 AM   Result Value Ref Range    Ferritin 10 (L) 12 - 150 ng/mL      No EKG required, no history of coronary heart disease, significant arrhythmia, peripheral arterial disease or other structural heart disease.    Revised Cardiac Risk Index (RCRI):  The patient has the following serious cardiovascular risks for perioperative complications:   - No serious cardiac risks = 0 points     RCRI Interpretation: 0 points: Class I (very low risk - 0.4% complication rate)       Signed Electronically by: Kelsey Velez PA-C  Copy of this evaluation report is provided to requesting physician.

## 2021-07-19 NOTE — TELEPHONE ENCOUNTER
Per Rosendo in SDS pt called and cancelled surgery for tomorrow as she is not feeling well.  FYI to Dr. Dewey.  Removed from outlook.    -Julia Chapman  [facility]

## 2021-08-24 ENCOUNTER — MYC MEDICAL ADVICE (OUTPATIENT)
Dept: OBGYN | Facility: CLINIC | Age: 41
End: 2021-08-24

## 2021-08-24 DIAGNOSIS — R10.2 PELVIC PAIN IN FEMALE: Primary | ICD-10-CM

## 2021-08-24 RX ORDER — OXYCODONE HCL 10 MG/1
10 TABLET, FILM COATED, EXTENDED RELEASE ORAL ONCE
Status: CANCELLED | OUTPATIENT
Start: 2021-08-24 | End: 2021-08-24

## 2021-08-24 RX ORDER — ACETAMINOPHEN 325 MG/1
975 TABLET ORAL ONCE
Status: CANCELLED | OUTPATIENT
Start: 2021-08-24 | End: 2021-08-24

## 2021-08-24 RX ORDER — CEFAZOLIN SODIUM 2 G/100ML
2 INJECTION, SOLUTION INTRAVENOUS SEE ADMIN INSTRUCTIONS
Status: CANCELLED | OUTPATIENT
Start: 2021-08-24

## 2021-08-24 RX ORDER — CEFAZOLIN SODIUM 2 G/100ML
2 INJECTION, SOLUTION INTRAVENOUS
Status: CANCELLED | OUTPATIENT
Start: 2021-08-24

## 2021-08-24 NOTE — TELEPHONE ENCOUNTER
Will forward pt's mychart request to reschedule surgery to Dr. Dewey.  Need new surgery orders in order to schedule again.    -Julia Chapman  [facility] [place]

## 2021-08-25 ENCOUNTER — TELEPHONE (OUTPATIENT)
Dept: OBGYN | Facility: CLINIC | Age: 41
End: 2021-08-25

## 2021-09-01 NOTE — TELEPHONE ENCOUNTER
"[id number]  Kimberley Choe    You are now scheduled for surgery at [facility].  Below are the details for your surgery.  Please read the \"Preparing for Your Surgery\" instructions and let us know if you have any questions.    Type of surgery: Diagnostic laparoscopy     Surgeon:  Tasha Dewey MD  Location of surgery: [facility] OR    Date of surgery: 10-5-21    Time: 10:00am   Arrival Time: 9:00am    Time can change, to be confirmed a couple of days prior by pre-op surgery nurse.    Pre-Op Appt Date: Patient to schedule with a PCP or Family Practice Provider within 30 days to the surgery.  Post-Op Appt Date: To be determined by provider     COVID test 2-4 days prior at [facility]     Packet sent out: Yes  Pre-cert/Authorization completed:  TBD by Financial Securing Office.   MA Sterilization/Hysterectomy Acknowledgment Consent signed: Not Applicable    [facility]  [phone]    Fax: [phone]  Same Day Surgery [phone]  Fax: [phone]  [facility] [phone]    "

## 2021-09-19 ENCOUNTER — HEALTH MAINTENANCE LETTER (OUTPATIENT)
Age: 41
End: 2021-09-19

## 2021-09-29 ENCOUNTER — OFFICE VISIT (OUTPATIENT)
Dept: FAMILY MEDICINE | Facility: CLINIC | Age: 41
End: 2021-09-29
Payer: COMMERCIAL

## 2021-09-29 VITALS
RESPIRATION RATE: 18 BRPM | TEMPERATURE: 97.4 F | DIASTOLIC BLOOD PRESSURE: 76 MMHG | HEART RATE: 81 BPM | BODY MASS INDEX: 23.72 KG/M2 | WEIGHT: 138.2 LBS | OXYGEN SATURATION: 98 % | SYSTOLIC BLOOD PRESSURE: 114 MMHG

## 2021-09-29 DIAGNOSIS — R10.2 PELVIC PAIN IN FEMALE: ICD-10-CM

## 2021-09-29 DIAGNOSIS — Z01.818 PREOP GENERAL PHYSICAL EXAM: Primary | ICD-10-CM

## 2021-09-29 DIAGNOSIS — J06.9 UPPER RESPIRATORY TRACT INFECTION, UNSPECIFIED TYPE: ICD-10-CM

## 2021-09-29 DIAGNOSIS — E89.0 POSTOPERATIVE HYPOTHYROIDISM: ICD-10-CM

## 2021-09-29 DIAGNOSIS — Z20.822 SUSPECTED COVID-19 VIRUS INFECTION: ICD-10-CM

## 2021-09-29 PROCEDURE — U0003 INFECTIOUS AGENT DETECTION BY NUCLEIC ACID (DNA OR RNA); SEVERE ACUTE RESPIRATORY SYNDROME CORONAVIRUS 2 (SARS-COV-2) (CORONAVIRUS DISEASE [COVID-19]), AMPLIFIED PROBE TECHNIQUE, MAKING USE OF HIGH THROUGHPUT TECHNOLOGIES AS DESCRIBED BY CMS-2020-01-R: HCPCS | Performed by: FAMILY MEDICINE

## 2021-09-29 PROCEDURE — 99214 OFFICE O/P EST MOD 30 MIN: CPT | Performed by: FAMILY MEDICINE

## 2021-09-29 PROCEDURE — U0005 INFEC AGEN DETEC AMPLI PROBE: HCPCS | Performed by: FAMILY MEDICINE

## 2021-09-29 RX ORDER — LEVOTHYROXINE SODIUM 150 UG/1
150 TABLET ORAL DAILY
Qty: 90 TABLET | Refills: 3 | Status: SHIPPED | OUTPATIENT
Start: 2021-09-29 | End: 2022-10-18

## 2021-09-29 NOTE — PROGRESS NOTES
[facility] [place]  [address]  [place] [address]  Phone: [phone]  Primary Provider: Coco Lepe  Pre-op Performing Provider: COCO LEPE      PREOPERATIVE EVALUATION:  Today's date: 9/29/2021    Kimberley Choe is a 41 year old female who presents for a preoperative evaluation.    Surgical Information:  Surgery/Procedure: Diagnostic laparoscopy  Surgery Location: [facility]   Surgeon: Tasha Dewey MD  Surgery Date: 10/5/21  Time of Surgery: 1000  Where patient plans to recover: At home with family  Fax number for surgical facility: Note does not need to be faxed, will be available electronically in Epic.    Type of Anesthesia Anticipated: General    Assessment & Plan     The proposed surgical procedure is considered LOW risk.    Preop general physical exam      Chronic pelvic pain in female, worsening per patient  Scheduled for a diagnostic laparoscopy    Postoperative hypothyroidism  Recent TSH was normal  -Refill: EUTHYROX 150 MCG tablet  Dispense: 90 tablet; Refill: 3    Upper respiratory tract infection, unspecified type Suspected COVID-19 virus infection  - Symptomatic COVID-19 Virus (Coronavirus) by PCR Nasopharyngeal  -In the interim recommended supportive management.  Increase fluid intake.  Rest.  Tylenol or NSAID as needed for headaches.         RECOMMENDATION:  APPROVAL GIVEN to proceed with proposed procedure, without further diagnostic evaluation.          Subjective     HPI related to upcoming procedure:     41-year-old pleasant female patient of mine here for a preop exam.  She has a known history of chronic pelvic pain that she feels is worsening in the past 4 to 5 months.  Was recently seen by OB/GYN.  They recommended diagnostic laparoscopy to further evaluate for endometriosis.  Patient states that she understands the risks and benefits of the procedure and wishes to proceed.    Reports that over the past 3 days she has  had URI symptoms to include a nonproductive cough with sore throat, loss of taste and smell with a headache but no fever.  Reports that she did # 4 Covid tests at home that were reported negative.  No recent known exposures that she is aware of.      Preop Questions 9/29/2021   1. Have you ever had a heart attack or stroke? No   2. Have you ever had surgery on your heart or blood vessels, such as a stent placement, a coronary artery bypass, or surgery on an artery in your head, neck, heart, or legs? No   3. Do you have chest pain with activity? No   4. Do you have a history of  heart failure? No   5. Do you currently have a cold, bronchitis or symptoms of other infection? YES - completed 4 at home COVID test all = negative. Daughter was also tested due to cold and test was negative for COVID   6. Do you have a cough, shortness of breath, or wheezing? YES - cough & some SOB, feeling winded.    7. Do you or anyone in your family have previous history of blood clots? No   8. Do you or does anyone in your family have a serious bleeding problem such as prolonged bleeding following surgeries or cuts? No   9. Have you ever had problems with anemia or been told to take iron pills? YES -    10. Have you had any abnormal blood loss such as black, tarry or bloody stools, or abnormal vaginal bleeding? YES -    11. Have you ever had a blood transfusion? No   12. Are you willing to have a blood transfusion if it is medically needed before, during, or after your surgery? Yes   13. Have you or any of your relatives ever had problems with anesthesia? No   14. Do you have sleep apnea, excessive snoring or daytime drowsiness? No   15. Do you have any artifical heart valves or other implanted medical devices like a pacemaker, defibrillator, or continuous glucose monitor? No   16. Do you have artificial joints? No   17. Are you allergic to latex? No   18. Is there any chance that you may be pregnant? No     Health Care  Directive:  Patient does not have a Health Care Directive or Living Will: Discussed advance care planning with patient; however, patient declined at this time.    Preoperative Review of :   reviewed - no record of controlled substances prescribed.      Status of Chronic Conditions:  HYPOTHYROIDISM - Patient has a longstanding history of chronic Hypothyroidism. Patient has been doing well, noting no tremor, insomnia, hair loss or changes in skin texture. Continues to take medications as directed, without adverse reactions or side effects. Last TSH   Lab Results   Component Value Date    TSH 2.52 07/07/2021   .        Review of Systems  Constitutional, neuro, ENT, endocrine, pulmonary, cardiac, gastrointestinal, genitourinary, musculoskeletal, integument and psychiatric systems are negative, except as otherwise noted.    Patient Active Problem List    Diagnosis Date Noted     Pelvic pain in female 06/03/2021     Priority: Medium     Iron deficiency anemia, unspecified iron deficiency anemia type 12/13/2017     Priority: Medium     Fatty liver disease, nonalcoholic 10/03/2013     Priority: Medium     Celiac disease 11/10/2010     Priority: Medium     CARDIOVASCULAR SCREENING; LDL GOAL LESS THAN 160 10/31/2010     Priority: Medium     Anemia 06/17/2010     Priority: Medium     Better after diagnosis of celiac disease       Family history of congenital heart disease 08/29/2009     Priority: Medium     Overview:   evaluated  with  stress and  echo  2007       Vitamin D deficiency 06/23/2009     Priority: Medium     Undiagnosed cardiac murmurs 03/07/2008     Priority: Medium     Echo ok.  No prophylactic antibiotics required       Hypothyroidism 12/20/2005     Priority: Medium     S/p thyroidectomy 2002        Past Medical History:   Diagnosis Date     Anemia, unspecified     with pregnancy 6/06     Arthritis 2006    Right hip     Celiac disease      Dysmenorrhea 2/1/2012     GERD (gastroesophageal reflux disease)  1/30/2014     Papanicolaou smear of cervix with low grade squamous intraepithelial lesion (LGSIL) 7/09    Colpo negative     Right hip labral tear 8/24/2006     Unspecified closed fracture of ankle     Left foot     Unspecified disorder of thyroid 10/20/2002    Thyroid disease, goiter nodules, S/p thyroidectomy 2002     Past Surgical History:   Procedure Laterality Date     BIOPSY  2014    Liver     C HIP ARTHROSCOPY, DX  6/08    (R) Anterior superior labral debridement.      ENT SURGERY  2000    Thyroidectomy     ESOPHAGOSCOPY, GASTROSCOPY, DUODENOSCOPY (EGD), COMBINED  11/1/2010    EGD     THYROIDECTOMY   2002    S/p thyroidectomy 2002     Current Outpatient Medications   Medication Sig Dispense Refill     EUTHYROX 150 MCG tablet Take 1 tablet (150 mcg) by mouth daily 90 tablet 3       Allergies   Allergen Reactions     Gluten Other (See Comments)     Celiac disease     Macrobid [Nitrofuran Derivatives] Nausea and Vomiting     Zithromax [Azithromycin Dihydrate] Nausea and Vomiting        Social History     Tobacco Use     Smoking status: Never Smoker     Smokeless tobacco: Never Used   Substance Use Topics     Alcohol use: Yes     Comment: rare     Family History   Problem Relation Age of Onset     Diabetes Maternal Grandmother      Alcohol/Drug Maternal Grandmother      Gastrointestinal Disease Paternal Grandmother         IBS     Hypertension Paternal Grandmother      Cerebrovascular Disease Paternal Grandmother      Alzheimer Disease Paternal Grandmother      Hypertension Father      Arthritis Father      Heart Disease Father         cardimyopthy age 60, arrythmias     Lipids Father      Neurologic Disorder Sister         brain tumor     Gastrointestinal Disease Mother         celiac     C.A.D. No family hx of      Breast Cancer No family hx of      Cancer - colorectal No family hx of      Anesthesia Reaction No family hx of      Bleeding Disorder No family hx of      History   Drug Use No         Objective      /76   Pulse 81   Temp 97.4  F (36.3  C) (Tympanic)   Resp 18   Wt 62.7 kg (138 lb 3.2 oz)   SpO2 98%   Breastfeeding No   BMI 23.72 kg/m      Physical Exam    GENERAL APPEARANCE: healthy, alert and no distress     EYES: EOMI, PERRL     HENT: ear canals and TM's normal and nose and mouth without ulcers or lesions     NECK: no adenopathy, no asymmetry, masses, or scars and thyroid normal to palpation     RESP: lungs clear to auscultation - no rales, rhonchi or wheezes     CV: regular rates and rhythm, normal S1 S2, no S3 or S4 and no murmur, click or rub     ABDOMEN:  soft, nontender, no HSM or masses and bowel sounds normal     MS: extremities normal- no gross deformities noted, no evidence of inflammation in joints, FROM in all extremities.     SKIN: no suspicious lesions or rashes     NEURO: Normal strength and tone, sensory exam grossly normal, mentation intact and speech normal     PSYCH: mentation appears normal. and affect normal/bright     LYMPHATICS: No cervical adenopathy    Recent Labs   Lab Test 07/07/21  0930 11/27/20  0825 02/20/20  0902 02/20/20  0902   HGB 14.3 14.3   < > 13.9    231   < > 109*   NA  --  138  --  139   POTASSIUM  --  4.1  --  4.0   CR  --  0.77  --  0.72    < > = values in this interval not displayed.        Diagnostics:  No labs were ordered during this visit.   No EKG required for low risk surgery (cataract, skin procedure, breast biopsy, etc).    Revised Cardiac Risk Index (RCRI):  The patient has the following serious cardiovascular risks for perioperative complications:   - No serious cardiac risks = 0 points     RCRI Interpretation: 0 points: Class I (very low risk - 0.4% complication rate)           Signed Electronically by: Coco Lepe MD  Copy of this evaluation report is provided to requesting physician.

## 2021-09-29 NOTE — PATIENT INSTRUCTIONS

## 2021-09-30 LAB — SARS-COV-2 RNA RESP QL NAA+PROBE: NEGATIVE

## 2021-10-04 ENCOUNTER — ANESTHESIA EVENT (OUTPATIENT)
Dept: SURGERY | Facility: CLINIC | Age: 41
End: 2021-10-04
Payer: COMMERCIAL

## 2021-10-04 NOTE — ANESTHESIA PREPROCEDURE EVALUATION
Anesthesia Pre-Procedure Evaluation    Patient: Kimberley Choe   MRN: [id number] : 1980        Preoperative Diagnosis: Pelvic pain in female [R10.2]    Procedure : Procedure(s):  Diagnostic laparoscopy          Past Medical History:   Diagnosis Date     Anemia, unspecified     with pregnancy      Arthritis 2006    Right hip     Celiac disease      Dysmenorrhea 2012     GERD (gastroesophageal reflux disease) 2014     Papanicolaou smear of cervix with low grade squamous intraepithelial lesion (LGSIL)     Colpo negative     Right hip labral tear 2006     Unspecified closed fracture of ankle     Left foot     Unspecified disorder of thyroid 10/20/2002    Thyroid disease, goiter nodules, S/p thyroidectomy       Past Surgical History:   Procedure Laterality Date     BIOPSY      Liver     C HIP ARTHROSCOPY, DX      (R) Anterior superior labral debridement.      ENT SURGERY      Thyroidectomy     ESOPHAGOSCOPY, GASTROSCOPY, DUODENOSCOPY (EGD), COMBINED  2010    EGD     THYROIDECTOMY       S/p thyroidectomy       Allergies   Allergen Reactions     Gluten Other (See Comments)     Celiac disease     Macrobid [Nitrofuran Derivatives] Nausea and Vomiting     Zithromax [Azithromycin Dihydrate] Nausea and Vomiting      Social History     Tobacco Use     Smoking status: Never Smoker     Smokeless tobacco: Never Used   Substance Use Topics     Alcohol use: Yes     Comment: rare      Wt Readings from Last 1 Encounters:   21 62.7 kg (138 lb 3.2 oz)        Anesthesia Evaluation   Pt has had prior anesthetic. Type: General, MAC and Regional.    History of anesthetic complications  - PONV.      ROS/MED HX  ENT/Pulmonary:  - neg pulmonary ROS     Neurologic:  - neg neurologic ROS     Cardiovascular:     (+) Dyslipidemia -----valvular problems/murmurs Previous cardiac testing   Echo: Date: Results:    Stress Test: Date: Results:    ECG Reviewed: Date: 7/10/18 Results:  Sinus  Rhythm   Low voltage in precordial leads.    -RSR(V1) -nondiagnostic.    -Left atrial enlargement.     ABNORMAL   Cath: Date: Results:      METS/Exercise Tolerance:     Hematologic:     (+) anemia,     Musculoskeletal:   (+) arthritis,     GI/Hepatic:     (+) GERD, Inflammatory bowel disease, liver disease (Liver biopsy),     Renal/Genitourinary:  - neg Renal ROS     Endo:     (+) thyroid problem, hypothyroidism,     Psychiatric/Substance Use:  - neg psychiatric ROS     Infectious Disease:  - neg infectious disease ROS     Malignancy:  - neg malignancy ROS     Other:  - neg other ROS          Physical Exam    Airway  airway exam normal      Mallampati: I   TM distance: > 3 FB   Neck ROM: full   Mouth opening: > 3 cm    Respiratory Devices and Support         Dental  no notable dental history         Cardiovascular   cardiovascular exam normal          Pulmonary   pulmonary exam normal                OUTSIDE LABS:  CBC:   Lab Results   Component Value Date    WBC 5.8 07/07/2021    WBC 5.5 11/27/2020    HGB 14.3 07/07/2021    HGB 14.3 11/27/2020    HCT 42.6 07/07/2021    HCT 42.2 11/27/2020     07/07/2021     11/27/2020     BMP:   Lab Results   Component Value Date     11/27/2020     02/20/2020    POTASSIUM 4.1 11/27/2020    POTASSIUM 4.0 02/20/2020    CHLORIDE 105 11/27/2020    CHLORIDE 106 02/20/2020    CO2 29 11/27/2020    CO2 27 02/20/2020    BUN 10 11/27/2020    BUN 7 02/20/2020    CR 0.77 11/27/2020    CR 0.72 02/20/2020    GLC 89 11/27/2020    GLC 94 02/20/2020     COAGS:   Lab Results   Component Value Date    INR 1.03 04/07/2015     POC:   Lab Results   Component Value Date    HCG Negative 02/20/2020    HCGS Negative 03/12/2021     HEPATIC:   Lab Results   Component Value Date    ALBUMIN 4.4 03/12/2021    PROTTOTAL 7.6 03/12/2021    ALT 54 (H) 03/12/2021    AST 21 03/12/2021    GGT 20 01/08/2019    ALKPHOS 67 03/12/2021    BILITOTAL 0.9 03/12/2021     OTHER:   Lab Results   Component  Value Date    LILIAN 8.2 (L) 11/27/2020    LIPASE 143 04/04/2017    TSH 2.52 07/07/2021    T4 1.61 (H) 03/12/2021    CRP <2.9 10/01/2019    SED 4 10/01/2019       Anesthesia Plan    ASA Status:  2   NPO Status:  NPO Appropriate    Anesthesia Type: General.     - Airway: ETT   Induction: Intravenous, Propofol.   Maintenance: TIVA.        Consents    Anesthesia Plan(s) and associated risks, benefits, and realistic alternatives discussed. Questions answered and patient/representative(s) expressed understanding.     - Discussed with:  Patient         Postoperative Care    Pain management: IV analgesics, Multi-modal analgesia.   PONV prophylaxis: Ondansetron (or other 5HT-3), Dexamethasone or Solumedrol     Comments:                MING Sykes CRNA

## 2021-10-05 ENCOUNTER — ANESTHESIA (OUTPATIENT)
Dept: SURGERY | Facility: CLINIC | Age: 41
End: 2021-10-05
Payer: COMMERCIAL

## 2021-10-05 ENCOUNTER — HOSPITAL ENCOUNTER (OUTPATIENT)
Facility: CLINIC | Age: 41
Discharge: HOME | End: 2021-10-05
Attending: OBSTETRICS & GYNECOLOGY | Admitting: OBSTETRICS & GYNECOLOGY
Payer: COMMERCIAL

## 2021-10-05 VITALS
WEIGHT: 138 LBS | HEIGHT: 64 IN | HEART RATE: 75 BPM | OXYGEN SATURATION: 99 % | TEMPERATURE: 97.8 F | DIASTOLIC BLOOD PRESSURE: 69 MMHG | RESPIRATION RATE: 15 BRPM | BODY MASS INDEX: 23.56 KG/M2 | SYSTOLIC BLOOD PRESSURE: 106 MMHG

## 2021-10-05 DIAGNOSIS — Z98.890 POSTOPERATIVE STATE: Primary | ICD-10-CM

## 2021-10-05 DIAGNOSIS — R10.2 PELVIC PAIN IN FEMALE: ICD-10-CM

## 2021-10-05 LAB
HCG UR QL: NEGATIVE
SARS-COV-2 RNA RESP QL NAA+PROBE: NEGATIVE

## 2021-10-05 PROCEDURE — 250N000013 HC RX MED GY IP 250 OP 250 PS 637: Performed by: NURSE ANESTHETIST, CERTIFIED REGISTERED

## 2021-10-05 PROCEDURE — 250N000011 HC RX IP 250 OP 636: Performed by: NURSE ANESTHETIST, CERTIFIED REGISTERED

## 2021-10-05 PROCEDURE — 250N000011 HC RX IP 250 OP 636: Performed by: OBSTETRICS & GYNECOLOGY

## 2021-10-05 PROCEDURE — 250N000009 HC RX 250: Performed by: NURSE ANESTHETIST, CERTIFIED REGISTERED

## 2021-10-05 PROCEDURE — 272N000001 HC OR GENERAL SUPPLY STERILE: Performed by: OBSTETRICS & GYNECOLOGY

## 2021-10-05 PROCEDURE — 81025 URINE PREGNANCY TEST: CPT | Performed by: NURSE ANESTHETIST, CERTIFIED REGISTERED

## 2021-10-05 PROCEDURE — 87635 SARS-COV-2 COVID-19 AMP PRB: CPT | Performed by: OBSTETRICS & GYNECOLOGY

## 2021-10-05 PROCEDURE — 250N000009 HC RX 250: Performed by: OBSTETRICS & GYNECOLOGY

## 2021-10-05 PROCEDURE — 999N000141 HC STATISTIC PRE-PROCEDURE NURSING ASSESSMENT: Performed by: OBSTETRICS & GYNECOLOGY

## 2021-10-05 PROCEDURE — 710N000012 HC RECOVERY PHASE 2, PER MINUTE: Performed by: OBSTETRICS & GYNECOLOGY

## 2021-10-05 PROCEDURE — 250N000013 HC RX MED GY IP 250 OP 250 PS 637: Performed by: OBSTETRICS & GYNECOLOGY

## 2021-10-05 PROCEDURE — 258N000003 HC RX IP 258 OP 636: Performed by: NURSE ANESTHETIST, CERTIFIED REGISTERED

## 2021-10-05 PROCEDURE — 360N000077 HC SURGERY LEVEL 4, PER MIN: Performed by: OBSTETRICS & GYNECOLOGY

## 2021-10-05 PROCEDURE — 710N000009 HC RECOVERY PHASE 1, LEVEL 1, PER MIN: Performed by: OBSTETRICS & GYNECOLOGY

## 2021-10-05 PROCEDURE — 58662 LAPAROSCOPY EXCISE LESIONS: CPT | Performed by: OBSTETRICS & GYNECOLOGY

## 2021-10-05 PROCEDURE — 370N000017 HC ANESTHESIA TECHNICAL FEE, PER MIN: Performed by: OBSTETRICS & GYNECOLOGY

## 2021-10-05 RX ORDER — CEFAZOLIN SODIUM 2 G/100ML
2 INJECTION, SOLUTION INTRAVENOUS SEE ADMIN INSTRUCTIONS
Status: DISCONTINUED | OUTPATIENT
Start: 2021-10-05 | End: 2021-10-05 | Stop reason: HOSPADM

## 2021-10-05 RX ORDER — KETAMINE HYDROCHLORIDE 10 MG/ML
INJECTION, SOLUTION INTRAMUSCULAR; INTRAVENOUS PRN
Status: DISCONTINUED | OUTPATIENT
Start: 2021-10-05 | End: 2021-10-05

## 2021-10-05 RX ORDER — ONDANSETRON 2 MG/ML
4 INJECTION INTRAMUSCULAR; INTRAVENOUS EVERY 30 MIN PRN
Status: DISCONTINUED | OUTPATIENT
Start: 2021-10-05 | End: 2021-10-05 | Stop reason: HOSPADM

## 2021-10-05 RX ORDER — DEXAMETHASONE SODIUM PHOSPHATE 4 MG/ML
INJECTION, SOLUTION INTRA-ARTICULAR; INTRALESIONAL; INTRAMUSCULAR; INTRAVENOUS; SOFT TISSUE PRN
Status: DISCONTINUED | OUTPATIENT
Start: 2021-10-05 | End: 2021-10-05

## 2021-10-05 RX ORDER — LIDOCAINE HYDROCHLORIDE 10 MG/ML
INJECTION, SOLUTION INFILTRATION; PERINEURAL PRN
Status: DISCONTINUED | OUTPATIENT
Start: 2021-10-05 | End: 2021-10-05

## 2021-10-05 RX ORDER — CEFAZOLIN SODIUM 2 G/100ML
2 INJECTION, SOLUTION INTRAVENOUS
Status: COMPLETED | OUTPATIENT
Start: 2021-10-05 | End: 2021-10-05

## 2021-10-05 RX ORDER — BUPIVACAINE HYDROCHLORIDE AND EPINEPHRINE 2.5; 5 MG/ML; UG/ML
INJECTION, SOLUTION INFILTRATION; PERINEURAL PRN
Status: DISCONTINUED | OUTPATIENT
Start: 2021-10-05 | End: 2021-10-05 | Stop reason: HOSPADM

## 2021-10-05 RX ORDER — PROPOFOL 10 MG/ML
INJECTION, EMULSION INTRAVENOUS PRN
Status: DISCONTINUED | OUTPATIENT
Start: 2021-10-05 | End: 2021-10-05

## 2021-10-05 RX ORDER — HYDROXYZINE HYDROCHLORIDE 25 MG/1
25 TABLET, FILM COATED ORAL
Status: COMPLETED | OUTPATIENT
Start: 2021-10-05 | End: 2021-10-05

## 2021-10-05 RX ORDER — OXYCODONE HCL 10 MG/1
10 TABLET, FILM COATED, EXTENDED RELEASE ORAL ONCE
Status: COMPLETED | OUTPATIENT
Start: 2021-10-05 | End: 2021-10-05

## 2021-10-05 RX ORDER — IBUPROFEN 200 MG
800 TABLET ORAL ONCE
Status: DISCONTINUED | OUTPATIENT
Start: 2021-10-05 | End: 2021-10-05 | Stop reason: HOSPADM

## 2021-10-05 RX ORDER — SODIUM CHLORIDE, SODIUM LACTATE, POTASSIUM CHLORIDE, CALCIUM CHLORIDE 600; 310; 30; 20 MG/100ML; MG/100ML; MG/100ML; MG/100ML
INJECTION, SOLUTION INTRAVENOUS CONTINUOUS
Status: DISCONTINUED | OUTPATIENT
Start: 2021-10-05 | End: 2021-10-05 | Stop reason: HOSPADM

## 2021-10-05 RX ORDER — ONDANSETRON 4 MG/1
4-8 TABLET, ORALLY DISINTEGRATING ORAL EVERY 8 HOURS PRN
Qty: 4 TABLET | Refills: 0 | Status: SHIPPED | OUTPATIENT
Start: 2021-10-05 | End: 2021-10-20

## 2021-10-05 RX ORDER — IBUPROFEN 800 MG/1
800 TABLET, FILM COATED ORAL EVERY 6 HOURS PRN
Qty: 30 TABLET | Refills: 0 | Status: SHIPPED | OUTPATIENT
Start: 2021-10-05 | End: 2021-11-22

## 2021-10-05 RX ORDER — ONDANSETRON 4 MG/1
4 TABLET, ORALLY DISINTEGRATING ORAL EVERY 30 MIN PRN
Status: DISCONTINUED | OUTPATIENT
Start: 2021-10-05 | End: 2021-10-05 | Stop reason: HOSPADM

## 2021-10-05 RX ORDER — OXYCODONE HYDROCHLORIDE 5 MG/1
5-10 TABLET ORAL
Status: COMPLETED | OUTPATIENT
Start: 2021-10-05 | End: 2021-10-05

## 2021-10-05 RX ORDER — MEPERIDINE HYDROCHLORIDE 25 MG/ML
12.5 INJECTION INTRAMUSCULAR; INTRAVENOUS; SUBCUTANEOUS
Status: DISCONTINUED | OUTPATIENT
Start: 2021-10-05 | End: 2021-10-05 | Stop reason: HOSPADM

## 2021-10-05 RX ORDER — OXYCODONE HYDROCHLORIDE 5 MG/1
5-10 TABLET ORAL EVERY 4 HOURS PRN
Qty: 12 TABLET | Refills: 0 | Status: SHIPPED | OUTPATIENT
Start: 2021-10-05 | End: 2021-10-20

## 2021-10-05 RX ORDER — FENTANYL CITRATE 50 UG/ML
50 INJECTION, SOLUTION INTRAMUSCULAR; INTRAVENOUS EVERY 5 MIN PRN
Status: DISCONTINUED | OUTPATIENT
Start: 2021-10-05 | End: 2021-10-05 | Stop reason: HOSPADM

## 2021-10-05 RX ORDER — MAGNESIUM SULFATE HEPTAHYDRATE 40 MG/ML
2 INJECTION, SOLUTION INTRAVENOUS ONCE
Status: COMPLETED | OUTPATIENT
Start: 2021-10-05 | End: 2021-10-05

## 2021-10-05 RX ORDER — ACETAMINOPHEN 325 MG/1
975 TABLET ORAL ONCE
Status: COMPLETED | OUTPATIENT
Start: 2021-10-05 | End: 2021-10-05

## 2021-10-05 RX ORDER — LIDOCAINE 40 MG/G
CREAM TOPICAL
Status: DISCONTINUED | OUTPATIENT
Start: 2021-10-05 | End: 2021-10-05 | Stop reason: HOSPADM

## 2021-10-05 RX ORDER — FENTANYL CITRATE 50 UG/ML
INJECTION, SOLUTION INTRAMUSCULAR; INTRAVENOUS PRN
Status: DISCONTINUED | OUTPATIENT
Start: 2021-10-05 | End: 2021-10-05

## 2021-10-05 RX ORDER — PROPOFOL 10 MG/ML
INJECTION, EMULSION INTRAVENOUS CONTINUOUS PRN
Status: DISCONTINUED | OUTPATIENT
Start: 2021-10-05 | End: 2021-10-05

## 2021-10-05 RX ORDER — GABAPENTIN 300 MG/1
300 CAPSULE ORAL
Status: COMPLETED | OUTPATIENT
Start: 2021-10-05 | End: 2021-10-05

## 2021-10-05 RX ORDER — ACETAMINOPHEN 325 MG/1
975 TABLET ORAL ONCE
Status: DISCONTINUED | OUTPATIENT
Start: 2021-10-05 | End: 2021-10-05 | Stop reason: HOSPADM

## 2021-10-05 RX ORDER — ONDANSETRON 2 MG/ML
INJECTION INTRAMUSCULAR; INTRAVENOUS PRN
Status: DISCONTINUED | OUTPATIENT
Start: 2021-10-05 | End: 2021-10-05

## 2021-10-05 RX ORDER — HYDROMORPHONE HCL IN WATER/PF 6 MG/30 ML
0.4 PATIENT CONTROLLED ANALGESIA SYRINGE INTRAVENOUS EVERY 5 MIN PRN
Status: DISCONTINUED | OUTPATIENT
Start: 2021-10-05 | End: 2021-10-05 | Stop reason: HOSPADM

## 2021-10-05 RX ORDER — ACETAMINOPHEN 325 MG/1
975 TABLET ORAL ONCE
Status: DISCONTINUED | OUTPATIENT
Start: 2021-10-05 | End: 2021-10-05

## 2021-10-05 RX ADMIN — KETAMINE HYDROCHLORIDE 30 MG: 10 INJECTION INTRAMUSCULAR; INTRAVENOUS at 10:33

## 2021-10-05 RX ADMIN — PROPOFOL 200 MCG/KG/MIN: 10 INJECTION, EMULSION INTRAVENOUS at 10:16

## 2021-10-05 RX ADMIN — ONDANSETRON 4 MG: 2 INJECTION INTRAMUSCULAR; INTRAVENOUS at 10:52

## 2021-10-05 RX ADMIN — SODIUM CHLORIDE, POTASSIUM CHLORIDE, SODIUM LACTATE AND CALCIUM CHLORIDE: 600; 310; 30; 20 INJECTION, SOLUTION INTRAVENOUS at 09:22

## 2021-10-05 RX ADMIN — LIDOCAINE HYDROCHLORIDE 0.1 ML: 10 INJECTION, SOLUTION EPIDURAL; INFILTRATION; INTRACAUDAL; PERINEURAL at 09:22

## 2021-10-05 RX ADMIN — HYDROXYZINE HYDROCHLORIDE 25 MG: 25 TABLET, FILM COATED ORAL at 11:42

## 2021-10-05 RX ADMIN — MAGNESIUM SULFATE HEPTAHYDRATE 2 G: 40 INJECTION, SOLUTION INTRAVENOUS at 10:31

## 2021-10-05 RX ADMIN — MIDAZOLAM 2 MG: 1 INJECTION INTRAMUSCULAR; INTRAVENOUS at 10:16

## 2021-10-05 RX ADMIN — SUGAMMADEX 200 MG: 100 INJECTION, SOLUTION INTRAVENOUS at 10:52

## 2021-10-05 RX ADMIN — PROPOFOL 50 MG: 10 INJECTION, EMULSION INTRAVENOUS at 10:54

## 2021-10-05 RX ADMIN — FENTANYL CITRATE 50 MCG: 50 INJECTION, SOLUTION INTRAMUSCULAR; INTRAVENOUS at 11:40

## 2021-10-05 RX ADMIN — FENTANYL CITRATE 100 MCG: 50 INJECTION, SOLUTION INTRAMUSCULAR; INTRAVENOUS at 10:23

## 2021-10-05 RX ADMIN — GABAPENTIN 300 MG: 300 CAPSULE ORAL at 09:24

## 2021-10-05 RX ADMIN — ACETAMINOPHEN 975 MG: 325 TABLET, FILM COATED ORAL at 09:24

## 2021-10-05 RX ADMIN — ROCURONIUM BROMIDE 40 MG: 50 INJECTION, SOLUTION INTRAVENOUS at 10:24

## 2021-10-05 RX ADMIN — OXYCODONE HYDROCHLORIDE 10 MG: 5 TABLET ORAL at 12:53

## 2021-10-05 RX ADMIN — CEFAZOLIN SODIUM 2 G: 2 INJECTION, SOLUTION INTRAVENOUS at 10:16

## 2021-10-05 RX ADMIN — LIDOCAINE HYDROCHLORIDE 50 MG: 10 INJECTION, SOLUTION INFILTRATION; PERINEURAL at 10:23

## 2021-10-05 RX ADMIN — PROPOFOL 150 MG: 10 INJECTION, EMULSION INTRAVENOUS at 10:23

## 2021-10-05 RX ADMIN — OXYCODONE HYDROCHLORIDE 10 MG: 10 TABLET, FILM COATED, EXTENDED RELEASE ORAL at 09:24

## 2021-10-05 RX ADMIN — DEXAMETHASONE SODIUM PHOSPHATE 4 MG: 4 INJECTION, SOLUTION INTRA-ARTICULAR; INTRALESIONAL; INTRAMUSCULAR; INTRAVENOUS; SOFT TISSUE at 10:34

## 2021-10-05 ASSESSMENT — MIFFLIN-ST. JEOR: SCORE: 1275.96

## 2021-10-05 NOTE — ANESTHESIA POSTPROCEDURE EVALUATION
Patient: Kimberley Choe    Procedure: Procedure(s):  Diagnostic laparoscopy, cautery of endometriosis       Diagnosis:Pelvic pain in female [R10.2]  Diagnosis Additional Information: No value filed.    Anesthesia Type:  General    Note:  Disposition: Outpatient   Postop Pain Control: Uneventful            Sign Out: Well controlled pain   PONV: No   Neuro/Psych: Uneventful            Sign Out: Acceptable/Baseline neuro status   Airway/Respiratory: Uneventful            Sign Out: Acceptable/Baseline resp. status   CV/Hemodynamics: Uneventful            Sign Out: Acceptable CV status; No obvious hypovolemia; No obvious fluid overload   Other NRE: NONE   DID A NON-ROUTINE EVENT OCCUR? No           Last vitals:  Vitals Value Taken Time   /67 10/05/21 1145   Temp 36.6  C (97.8  F) 10/05/21 1118   Pulse 59 10/05/21 1151   Resp 14 10/05/21 1151   SpO2 100 % 10/05/21 1151   Vitals shown include unvalidated device data.    Electronically Signed By: MING Sykes CRNA  October 5, 2021  12:43 PM

## 2021-10-05 NOTE — OR NURSING
covid and hcg sent upon arrival. preop meds given and ready for surgery. Cont to have sl chest congestion. Prod cough as times but less freq now. No sob or dyspnea. Will cont to reassess and crna to be aware preop.

## 2021-10-05 NOTE — ANESTHESIA PROCEDURE NOTES
Airway       Patient location during procedure: OR       Procedure Start/Stop Times: 10/5/2021 10:26 AM  Staff -        CRNA: Izabela Jaimes APRN CRNA       Other Anesthesia Staff: Derrick Araya       Performed By: CRNA and RAMESH  Consent for Airway        Urgency: elective  Indications and Patient Condition       Indications for airway management: debi-procedural       Induction type:intravenous       Mask difficulty assessment: 1 - vent by mask    Final Airway Details       Final airway type: endotracheal airway       Successful airway: ETT - single  Endotracheal Airway Details        ETT size (mm): 7.0       Cuffed: yes       Successful intubation technique: video laryngoscopy       VL Blade Size: [place] 3       Grade View of Cords: 1       Adjucts: stylet       Position: Right       Measured from: lips       Secured at (cm): 21       Bite block used: None    Post intubation assessment        Placement verified by: capnometry, equal breath sounds and chest rise        Number of attempts at approach: 1       Number of other approaches attempted: 0       Secured with: silk tape       Ease of procedure: easy       Dentition: Intact and Unchanged

## 2021-10-05 NOTE — OP NOTE
Procedure Date: 10/05/2021    PREOPERATIVE DIAGNOSIS:  Pelvic pain.    POSTOPERATIVE DIAGNOSIS:  Moderate pelvic endometriosis.    PROCEDURE:  Diagnostic laparoscopy with cautery of endometriosis.    SURGEON:  Tasha Dewey MD    ANESTHESIA:  General.    FINDINGS:  Uterus was retroverted and normal in overall size and configuration.  Both ovaries appeared normal.  Within the cul-de-sac, there were several clusters of very dense endometriosis present along the uterosacrals and medial to the uterosacrals as well as some endometriosis along the pelvic sidewalls on both the right and left.  This was moderate in its nature and depth.    DESCRIPTION OF PROCEDURE:  After assuring informed consent, the patient was taken to the operative suite, where a general anesthetic was induced.  The patient was placed in the dorsal lithotomy position.  Abdomen and vagina were sterilely prepped and draped.  A timeout fire safety assessment was performed.  A Cortez cannula was placed within the cervix for uterine manipulation and a Murguia catheter within the bladder for drainage.  1000 milliliters of urine was returned.  A 1 cm infraumbilical skin incision was made with a knife with elevation of the anterior abdominal wall, a 5 mm blunt trocar was advanced to the abdominal cavity.  After assuring instrument in location, the abdomen was insufflated with carbon dioxide until fully distended.  Secondary trocars 5 mm in size were placed in the right lower quadrant and suprapubically, both under direct visualization.  The patient was then placed in a Trendelenburg position and the pelvis was inspected.  Utilizing a bipolar cautery device, the areas of endometriosis within the cul-de-sac were cauterized as possible.  Monopolar cautery was also utilized to ablate along the uterosacral ligaments, and the pelvic sidewalls in treatment of the endometriosis.  Once this was completed, the carbon dioxide was allowed to evacuate from the abdomen, the  trocars were removed, and the incisions were closed in a subcuticular fashion with 4-0 Monocryl suture and Dermabond.  The Murguia catheter was removed.  The patient was awakened and taken to postanesthesia recovery in stable condition.    ESTIMATED BLOOD LOSS:  Less than 5 mL.    SPECIMENS TO LABORATORY:  None.    Tasha Dewey MD        D: 10/05/2021   T: 10/05/2021   MT: KATHRYN    Name:     SANDRAMARY COOKPhillip  MRN:      [id number]        Account:        [id number]   :      1980           Procedure Date: 10/05/2021     Document: [id number]

## 2021-10-05 NOTE — BRIEF OP NOTE
[facility]    Brief Operative Note    Pre-operative diagnosis: Pelvic pain in female [R10.2]  Post-operative diagnosis moderate pelvic endometriosis    Procedure: Procedure(s):  Diagnostic laparoscopy, cautery of endometriosis  Surgeon: Surgeon(s) and Role:     * Tasha Dweey MD - Primary  Anesthesia: General   Estimated Blood Loss: <5 cc    Drains: None  Specimens: * No specimens in log *  Findings:   multifocal endometriosis in culdesac, along uterosacrals and pelvic sidewalls.  Complications: None.  Implants: * No implants in log *

## 2021-10-05 NOTE — ANESTHESIA CARE TRANSFER NOTE
Patient: Kimberley Choe    Procedure: Procedure(s):  Diagnostic laparoscopy, cautery of endometriosis       Diagnosis: Pelvic pain in female [R10.2]  Diagnosis Additional Information: No value filed.    Anesthesia Type:   General     Note:    Oropharynx: oropharynx clear of all foreign objects  Level of Consciousness: drowsy  Oxygen Supplementation: face mask  Level of Supplemental Oxygen (L/min / FiO2): 6  Independent Airway: airway patency satisfactory and stable  Dentition: dentition unchanged  Vital Signs Stable: post-procedure vital signs reviewed and stable  Report to RN Given: handoff report given  Patient transferred to: PACU    Handoff Report: Identifed the Patient, Identified the Reponsible Provider, Reviewed the pertinent medical history, Discussed the surgical course, Reviewed Intra-OP anesthesia mangement and issues during anesthesia, Set expectations for post-procedure period and Allowed opportunity for questions and acknowledgement of understanding      Vitals:  Vitals Value Taken Time   /71 10/05/21 1116   Temp     Pulse 84 10/05/21 1120   Resp 17 10/05/21 1120   SpO2 100 % 10/05/21 1120   Vitals shown include unvalidated device data.    Electronically Signed By: Derrick Araya  October 5, 2021  11:20 AM

## 2021-10-05 NOTE — DISCHARGE INSTRUCTIONS
Same Day Surgery Discharge Instructions  Special Precautions After Surgery - Adult    1. It is not unusual to feel lightheaded or faint, up to 24 hours after surgery or while taking pain medication.  If you have these symptoms; sit for a few minutes before standing and have someone assist you when getting up.  2. You should rest and relax for the next 24 hours and must have someone stay with you for at least 24 hours after your discharge.  3. DO NOT DRIVE any vehicle or operate mechanical equipment for 24 hours following the end of your surgery.  DO NOT DRIVE while taking narcotic pain medications that have been prescribed by your physician.  If you had a limb operated on, you must be able to use it fully to drive.  4. DO NOT drink alcoholic beverages for 24 hours following surgery or while taking prescription pain medication.  5. Drink clear liquids (apple juice, ginger ale, broth, 7-Up, etc.).  Progress to your regular diet as you feel able.  6. Any questions call your physician and do not make important decisions for 24 hours.    ACTIVITY  ? Rest today.  No activity or diet restrictions.  ? Resume activity as tolerated.  ? Restrictions: per Dr Dewey  ? See printed discharge sheet.     INCISIONAL CARE  ? Apply ice 1/2 hour on and 1/2 hour off while awake.  ? Be alert for signs of infection:  redness, swelling, heat, drainage of pus, and/or elevated temperature.  ? Watch for pelvic bleeding, odor. Along with burning urgency hesitancy with going to the bathroom: Contact your doctor if these occur     Call for an appointment to return to the clinic as directed    Medications:  ? Acetaminophen (Tylenol):  Next dose: as needed Take on a regular schedule 650 mg every 6 hours.  ?  Oxycodone:  Next dose: as needed.  ? Ibuprofen (Motrin, Advil):  Next dose: as needed take on a regular basis as directed.  ? zofran :  Next dose: as needed for nausea .  ? Stool softeners to prevent constipation  ? Follow  the instructions on the bottle.     Additional discharge instructions: pelvic rest as discussed with your MD  __________________________________________________________________________________________________________________________________  IMPORTANT NUMBERS:    [facility] Main Number:  [phone], [phone]  Pharmacy:  [phone]  Same Day Surgery:  [phone], Monday - Friday until 8:30 p.m.  Urgent Care:  [phone]  Emergency Room:  [phone]         Clinic:  [phone]

## 2021-10-20 ENCOUNTER — MYC MEDICAL ADVICE (OUTPATIENT)
Dept: OBGYN | Facility: CLINIC | Age: 41
End: 2021-10-20

## 2021-10-20 ENCOUNTER — OFFICE VISIT (OUTPATIENT)
Dept: OBGYN | Facility: CLINIC | Age: 41
End: 2021-10-20
Payer: COMMERCIAL

## 2021-10-20 VITALS
TEMPERATURE: 97.9 F | BODY MASS INDEX: 23.23 KG/M2 | DIASTOLIC BLOOD PRESSURE: 77 MMHG | HEART RATE: 81 BPM | WEIGHT: 136.1 LBS | RESPIRATION RATE: 16 BRPM | HEIGHT: 64 IN | SYSTOLIC BLOOD PRESSURE: 131 MMHG

## 2021-10-20 DIAGNOSIS — Z98.890 POSTOPERATIVE STATE: Primary | ICD-10-CM

## 2021-10-20 PROCEDURE — 99213 OFFICE O/P EST LOW 20 MIN: CPT | Mod: 24 | Performed by: OBSTETRICS & GYNECOLOGY

## 2021-10-20 RX ORDER — TRANEXAMIC ACID 10 MG/ML
1 INJECTION, SOLUTION INTRAVENOUS ONCE
Status: CANCELLED | OUTPATIENT
Start: 2021-10-20 | End: 2021-10-20

## 2021-10-20 RX ORDER — OXYCODONE HCL 10 MG/1
10 TABLET, FILM COATED, EXTENDED RELEASE ORAL ONCE
Status: CANCELLED | OUTPATIENT
Start: 2021-10-20 | End: 2021-10-20

## 2021-10-20 RX ORDER — PHENAZOPYRIDINE HYDROCHLORIDE 100 MG/1
200 TABLET, FILM COATED ORAL ONCE
Status: CANCELLED | OUTPATIENT
Start: 2021-10-20 | End: 2021-10-20

## 2021-10-20 RX ORDER — CEFAZOLIN SODIUM 2 G/100ML
2 INJECTION, SOLUTION INTRAVENOUS
Status: CANCELLED | OUTPATIENT
Start: 2021-10-20

## 2021-10-20 RX ORDER — CEFAZOLIN SODIUM 2 G/100ML
2 INJECTION, SOLUTION INTRAVENOUS SEE ADMIN INSTRUCTIONS
Status: CANCELLED | OUTPATIENT
Start: 2021-10-20

## 2021-10-20 RX ORDER — ACETAMINOPHEN 325 MG/1
975 TABLET ORAL ONCE
Status: CANCELLED | OUTPATIENT
Start: 2021-10-20 | End: 2021-10-20

## 2021-10-20 ASSESSMENT — MIFFLIN-ST. JEOR: SCORE: 1267.35

## 2021-10-20 NOTE — TELEPHONE ENCOUNTER
"[id number]  Kimberley Choe    You are now scheduled for surgery at [facility].  Below are the details for your surgery.  Please read the \"Preparing for Your Surgery\" instructions and let us know if you have any questions.    Type of surgery: HYSTERECTOMY, VAGINAL, LAPAROSCOPY-ASSISTED, excision of endometriosis, possible cystoscopy     Surgeon:  Tasha Dewey MD  Location of surgery: [facility] OR    Date of surgery: 12-2-21    Time: 12:30pm   Arrival Time: 11:00am    Time can change, to be confirmed a couple of days prior by pre-op surgery nurse.    Pre-Op Appt Date: Patient to schedule with a PCP or Family Practice Provider within 30 days to the surgery.  Post-Op Appt Date: To be determined by provider     [facility]   Date 11-30-21  Time 10:00am    Packet sent out: Yes  Pre-cert/Authorization completed:  TBD by Financial Securing Office.   MA Sterilization/Hysterectomy Acknowledgment Consent signed: Not Applicable    [facility]  [phone]    Fax: [phone]  Same Day Surgery [phone]  Fax: [phone]  [facility] [phone]    "

## 2021-10-20 NOTE — NURSING NOTE
"Initial /77 (BP Location: Right arm, Patient Position: Chair, Cuff Size: Adult Regular)   Pulse 81   Temp 97.9  F (36.6  C) (Tympanic)   Resp 16   Ht 1.626 m (5' 4\")   Wt 61.7 kg (136 lb 1.6 oz)   LMP 09/27/2021 (Approximate)   BMI 23.36 kg/m   Estimated body mass index is 23.36 kg/m  as calculated from the following:    Height as of this encounter: 1.626 m (5' 4\").    Weight as of this encounter: 61.7 kg (136 lb 1.6 oz). .      "

## 2021-11-22 ENCOUNTER — OFFICE VISIT (OUTPATIENT)
Dept: FAMILY MEDICINE | Facility: CLINIC | Age: 41
End: 2021-11-22
Payer: COMMERCIAL

## 2021-11-22 VITALS
HEART RATE: 76 BPM | DIASTOLIC BLOOD PRESSURE: 73 MMHG | WEIGHT: 138.8 LBS | RESPIRATION RATE: 16 BRPM | TEMPERATURE: 97.6 F | OXYGEN SATURATION: 98 % | BODY MASS INDEX: 23.82 KG/M2 | SYSTOLIC BLOOD PRESSURE: 115 MMHG

## 2021-11-22 DIAGNOSIS — N80.9 ENDOMETRIOSIS: ICD-10-CM

## 2021-11-22 DIAGNOSIS — R10.2 PELVIC PAIN IN FEMALE: ICD-10-CM

## 2021-11-22 DIAGNOSIS — Z01.818 PREOP GENERAL PHYSICAL EXAM: Primary | ICD-10-CM

## 2021-11-22 PROCEDURE — 99214 OFFICE O/P EST MOD 30 MIN: CPT | Performed by: PHYSICIAN ASSISTANT

## 2021-11-22 NOTE — H&P (VIEW-ONLY)
[facility]  [address]  [place] [address]  Phone: [phone]  Primary Provider: Coco Lepe  Pre-op Performing Provider: REBECCA AGOSTO      PREOPERATIVE EVALUATION:  Today's date: 11/22/2021    Kimberley Choe is a 41 year old female who presents for a preoperative evaluation.    Surgical Information:  Surgery/Procedure: HYSTERECTOMY, VAGINAL, LAPAROSCOPY-ASSISTED, excision of endometriosis, possible cystoscopy  Surgery Location: [place]   Surgeon: Tasha Dewey MD  Surgery Date: 12/2/21  Time of Surgery: 12:40  Where patient plans to recover: At home with family  Fax number for surgical facility: Note does not need to be faxed, will be available electronically in Epic.    Type of Anesthesia Anticipated: General    Assessment & Plan     The proposed surgical procedure is considered INTERMEDIATE risk.    1. Preop general physical exam    2. Pelvic pain in female    3. Endometriosis         Risks and Recommendations:  The patient has the following additional risks and recommendations for perioperative complications:   - No identified additional risk factors other than previously addressed    Medication Instructions:  Patient is to take all scheduled medications on the day of surgery    RECOMMENDATION:  APPROVAL GIVEN to proceed with proposed procedure, without further diagnostic evaluation.          Subjective     HPI related to upcoming procedure: Pelvic pain    Preop Questions 11/22/2021   1. Have you ever had a heart attack or stroke? No   2. Have you ever had surgery on your heart or blood vessels, such as a stent placement, a coronary artery bypass, or surgery on an artery in your head, neck, heart, or legs? No   3. Do you have chest pain with activity? No   4. Do you have a history of  heart failure? No   5. Do you currently have a cold, bronchitis or symptoms of other infection? No   6. Do you have a cough, shortness of breath, or wheezing? No    7. Do you or anyone in your family have previous history of blood clots? No   8. Do you or does anyone in your family have a serious bleeding problem such as prolonged bleeding following surgeries or cuts? No   9. Have you ever had problems with anemia or been told to take iron pills? YES - Current iron deficiency   10. Have you had any abnormal blood loss such as black, tarry or bloody stools, or abnormal vaginal bleeding? YES - heavy periods   11. Have you ever had a blood transfusion? No   12. Are you willing to have a blood transfusion if it is medically needed before, during, or after your surgery? Yes   13. Have you or any of your relatives ever had problems with anesthesia? No   14. Do you have sleep apnea, excessive snoring or daytime drowsiness? No   15. Do you have any artifical heart valves or other implanted medical devices like a pacemaker, defibrillator, or continuous glucose monitor? No   16. Do you have artificial joints? No   17. Are you allergic to latex? No   18. Is there any chance that you may be pregnant? No       Health Care Directive:  Patient does not have a Health Care Directive or Living Will: Discussed advance care planning with patient; information given to patient to review.    Preoperative Review of :  No active prescriptions reflective on med list      Status of Chronic Conditions:  See problem list for active medical problems.  Problems all longstanding and stable, except as noted/documented.  See ROS for pertinent symptoms related to these conditions.      Review of Systems  Constitutional, neuro, ENT, endocrine, pulmonary, cardiac, gastrointestinal, genitourinary, musculoskeletal, integument and psychiatric systems are negative, except as otherwise noted.    Patient Active Problem List    Diagnosis Date Noted     Endometriosis determined by laparoscopy 10/05/2021     Priority: Medium     Pelvic pain in female 06/03/2021     Priority: Medium     Iron deficiency anemia,  unspecified iron deficiency anemia type 12/13/2017     Priority: Medium     Fatty liver disease, nonalcoholic 10/03/2013     Priority: Medium     Celiac disease 11/10/2010     Priority: Medium     CARDIOVASCULAR SCREENING; LDL GOAL LESS THAN 160 10/31/2010     Priority: Medium     Anemia 06/17/2010     Priority: Medium     Better after diagnosis of celiac disease       Family history of congenital heart disease 08/29/2009     Priority: Medium     Overview:   evaluated  with  stress and  echo  2007       Vitamin D deficiency 06/23/2009     Priority: Medium     Undiagnosed cardiac murmurs 03/07/2008     Priority: Medium     Echo ok.  No prophylactic antibiotics required       Hypothyroidism 12/20/2005     Priority: Medium     S/p thyroidectomy 2002        Past Medical History:   Diagnosis Date     Anemia, unspecified     with pregnancy 6/06     Arthritis 2006    Right hip     Celiac disease      Dysmenorrhea 2/1/2012     GERD (gastroesophageal reflux disease) 1/30/2014     Papanicolaou smear of cervix with low grade squamous intraepithelial lesion (LGSIL) 7/09    Colpo negative     Right hip labral tear 8/24/2006     Unspecified closed fracture of ankle     Left foot     Unspecified disorder of thyroid 10/20/2002    Thyroid disease, goiter nodules, S/p thyroidectomy 2002     Past Surgical History:   Procedure Laterality Date     BIOPSY  2014    Liver     C HIP ARTHROSCOPY, DX  6/08    (R) Anterior superior labral debridement.      ENT SURGERY  2000    Thyroidectomy     ESOPHAGOSCOPY, GASTROSCOPY, DUODENOSCOPY (EGD), COMBINED  11/1/2010    EGD     LAPAROSCOPIC ABLATION ENDOMETRIOSIS  10/5/2021    Procedure: cautery of endometriosis;  Surgeon: Tasha Dewey MD;  Location: [facility]     LAPAROSCOPY DIAGNOSTIC (GYN) N/A 10/5/2021    Procedure: Diagnostic laparoscopy;  Surgeon: Tasha Dewey MD;  Location: [facility]     THYROIDECTOMY   2002    S/p thyroidectomy 2002     Current Outpatient Medications    Medication Sig Dispense Refill     EUTHYROX 150 MCG tablet Take 1 tablet (150 mcg) by mouth daily 90 tablet 3       Allergies   Allergen Reactions     Gluten Other (See Comments)     Celiac disease     Macrobid [Nitrofuran Derivatives] Nausea and Vomiting     Zithromax [Azithromycin Dihydrate] Nausea and Vomiting        Social History     Tobacco Use     Smoking status: Never Smoker     Smokeless tobacco: Never Used   Substance Use Topics     Alcohol use: Yes     Comment: rare     Family History   Problem Relation Age of Onset     Gastrointestinal Disease Mother         celiac     Liver Disease Mother         PVC     Hypertension Father      Arthritis Father      Heart Disease Father         cardimyopthy age 60, arrythmias     Lipids Father      Neurologic Disorder Sister         brain tumor     Diabetes Maternal Grandmother      Alcohol/Drug Maternal Grandmother      Gastrointestinal Disease Paternal Grandmother         IBS     Hypertension Paternal Grandmother      Cerebrovascular Disease Paternal Grandmother      Alzheimer Disease Paternal Grandmother      C.A.D. No family hx of      Breast Cancer No family hx of      Cancer - colorectal No family hx of      Anesthesia Reaction No family hx of      Bleeding Disorder No family hx of      History   Drug Use No         Objective     /73   Pulse 76   Temp 97.6  F (36.4  C) (Tympanic)   Resp 16   Wt 63 kg (138 lb 12.8 oz)   SpO2 98%   Breastfeeding No   BMI 23.82 kg/m      Physical Exam    GENERAL APPEARANCE: healthy, alert and no distress     EYES: EOMI, PERRL     HENT: ear canals and TM's normal and nose and mouth without ulcers or lesions     NECK: no adenopathy, no asymmetry, masses, or scars and thyroid normal to palpation     RESP: lungs clear to auscultation - no rales, rhonchi or wheezes     CV: regular rates and rhythm, normal S1 S2, no S3 or S4 and no murmur, click or rub     ABDOMEN:  soft, nontender, no HSM or masses and bowel sounds  normal     MS: extremities normal- no gross deformities noted, no evidence of inflammation in joints, FROM in all extremities.     SKIN: no suspicious lesions or rashes     NEURO: Normal strength and tone, sensory exam grossly normal, mentation intact and speech normal     PSYCH: mentation appears normal. and affect normal/bright     LYMPHATICS: No cervical adenopathy    Recent Labs   Lab Test 07/07/21  0930 11/27/20  0825 02/20/20  0902   HGB 14.3 14.3 13.9    231 109*   NA  --  138 139   POTASSIUM  --  4.1 4.0   CR  --  0.77 0.72        Diagnostics:  No labs were ordered during this visit.   No EKG required, no history of coronary heart disease, significant arrhythmia, peripheral arterial disease or other structural heart disease.    Revised Cardiac Risk Index (RCRI):  The patient has the following serious cardiovascular risks for perioperative complications:   - No serious cardiac risks = 0 points     RCRI Interpretation: 0 points: Class I (very low risk - 0.4% complication rate)       Signed Electronically by: Kelsey Velez PA-C  Copy of this evaluation report is provided to requesting physician.

## 2021-11-22 NOTE — PROGRESS NOTES
[facility]  [address]  [place] [address]  Phone: [phone]  Primary Provider: Coco Lepe  Pre-op Performing Provider: REBECCA AGOSTO      PREOPERATIVE EVALUATION:  Today's date: 11/22/2021    Kimberley Choe is a 41 year old female who presents for a preoperative evaluation.    Surgical Information:  Surgery/Procedure: HYSTERECTOMY, VAGINAL, LAPAROSCOPY-ASSISTED, excision of endometriosis, possible cystoscopy  Surgery Location: [place]   Surgeon: Tasha Dewey MD  Surgery Date: 12/2/21  Time of Surgery: 12:40  Where patient plans to recover: At home with family  Fax number for surgical facility: Note does not need to be faxed, will be available electronically in Epic.    Type of Anesthesia Anticipated: General    Assessment & Plan     The proposed surgical procedure is considered INTERMEDIATE risk.    1. Preop general physical exam    2. Pelvic pain in female    3. Endometriosis         Risks and Recommendations:  The patient has the following additional risks and recommendations for perioperative complications:   - No identified additional risk factors other than previously addressed    Medication Instructions:  Patient is to take all scheduled medications on the day of surgery    RECOMMENDATION:  APPROVAL GIVEN to proceed with proposed procedure, without further diagnostic evaluation.          Subjective     HPI related to upcoming procedure: Pelvic pain    Preop Questions 11/22/2021   1. Have you ever had a heart attack or stroke? No   2. Have you ever had surgery on your heart or blood vessels, such as a stent placement, a coronary artery bypass, or surgery on an artery in your head, neck, heart, or legs? No   3. Do you have chest pain with activity? No   4. Do you have a history of  heart failure? No   5. Do you currently have a cold, bronchitis or symptoms of other infection? No   6. Do you have a cough, shortness of breath, or wheezing? No    7. Do you or anyone in your family have previous history of blood clots? No   8. Do you or does anyone in your family have a serious bleeding problem such as prolonged bleeding following surgeries or cuts? No   9. Have you ever had problems with anemia or been told to take iron pills? YES - Current iron deficiency   10. Have you had any abnormal blood loss such as black, tarry or bloody stools, or abnormal vaginal bleeding? YES - heavy periods   11. Have you ever had a blood transfusion? No   12. Are you willing to have a blood transfusion if it is medically needed before, during, or after your surgery? Yes   13. Have you or any of your relatives ever had problems with anesthesia? No   14. Do you have sleep apnea, excessive snoring or daytime drowsiness? No   15. Do you have any artifical heart valves or other implanted medical devices like a pacemaker, defibrillator, or continuous glucose monitor? No   16. Do you have artificial joints? No   17. Are you allergic to latex? No   18. Is there any chance that you may be pregnant? No       Health Care Directive:  Patient does not have a Health Care Directive or Living Will: Discussed advance care planning with patient; information given to patient to review.    Preoperative Review of :  No active prescriptions reflective on med list      Status of Chronic Conditions:  See problem list for active medical problems.  Problems all longstanding and stable, except as noted/documented.  See ROS for pertinent symptoms related to these conditions.      Review of Systems  Constitutional, neuro, ENT, endocrine, pulmonary, cardiac, gastrointestinal, genitourinary, musculoskeletal, integument and psychiatric systems are negative, except as otherwise noted.    Patient Active Problem List    Diagnosis Date Noted     Endometriosis determined by laparoscopy 10/05/2021     Priority: Medium     Pelvic pain in female 06/03/2021     Priority: Medium     Iron deficiency anemia,  unspecified iron deficiency anemia type 12/13/2017     Priority: Medium     Fatty liver disease, nonalcoholic 10/03/2013     Priority: Medium     Celiac disease 11/10/2010     Priority: Medium     CARDIOVASCULAR SCREENING; LDL GOAL LESS THAN 160 10/31/2010     Priority: Medium     Anemia 06/17/2010     Priority: Medium     Better after diagnosis of celiac disease       Family history of congenital heart disease 08/29/2009     Priority: Medium     Overview:   evaluated  with  stress and  echo  2007       Vitamin D deficiency 06/23/2009     Priority: Medium     Undiagnosed cardiac murmurs 03/07/2008     Priority: Medium     Echo ok.  No prophylactic antibiotics required       Hypothyroidism 12/20/2005     Priority: Medium     S/p thyroidectomy 2002        Past Medical History:   Diagnosis Date     Anemia, unspecified     with pregnancy 6/06     Arthritis 2006    Right hip     Celiac disease      Dysmenorrhea 2/1/2012     GERD (gastroesophageal reflux disease) 1/30/2014     Papanicolaou smear of cervix with low grade squamous intraepithelial lesion (LGSIL) 7/09    Colpo negative     Right hip labral tear 8/24/2006     Unspecified closed fracture of ankle     Left foot     Unspecified disorder of thyroid 10/20/2002    Thyroid disease, goiter nodules, S/p thyroidectomy 2002     Past Surgical History:   Procedure Laterality Date     BIOPSY  2014    Liver     C HIP ARTHROSCOPY, DX  6/08    (R) Anterior superior labral debridement.      ENT SURGERY  2000    Thyroidectomy     ESOPHAGOSCOPY, GASTROSCOPY, DUODENOSCOPY (EGD), COMBINED  11/1/2010    EGD     LAPAROSCOPIC ABLATION ENDOMETRIOSIS  10/5/2021    Procedure: cautery of endometriosis;  Surgeon: Tasha Dewey MD;  Location: [facility]     LAPAROSCOPY DIAGNOSTIC (GYN) N/A 10/5/2021    Procedure: Diagnostic laparoscopy;  Surgeon: Tasha Dewey MD;  Location: [facility]     THYROIDECTOMY   2002    S/p thyroidectomy 2002     Current Outpatient Medications    Medication Sig Dispense Refill     EUTHYROX 150 MCG tablet Take 1 tablet (150 mcg) by mouth daily 90 tablet 3       Allergies   Allergen Reactions     Gluten Other (See Comments)     Celiac disease     Macrobid [Nitrofuran Derivatives] Nausea and Vomiting     Zithromax [Azithromycin Dihydrate] Nausea and Vomiting        Social History     Tobacco Use     Smoking status: Never Smoker     Smokeless tobacco: Never Used   Substance Use Topics     Alcohol use: Yes     Comment: rare     Family History   Problem Relation Age of Onset     Gastrointestinal Disease Mother         celiac     Liver Disease Mother         PVC     Hypertension Father      Arthritis Father      Heart Disease Father         cardimyopthy age 60, arrythmias     Lipids Father      Neurologic Disorder Sister         brain tumor     Diabetes Maternal Grandmother      Alcohol/Drug Maternal Grandmother      Gastrointestinal Disease Paternal Grandmother         IBS     Hypertension Paternal Grandmother      Cerebrovascular Disease Paternal Grandmother      Alzheimer Disease Paternal Grandmother      C.A.D. No family hx of      Breast Cancer No family hx of      Cancer - colorectal No family hx of      Anesthesia Reaction No family hx of      Bleeding Disorder No family hx of      History   Drug Use No         Objective     /73   Pulse 76   Temp 97.6  F (36.4  C) (Tympanic)   Resp 16   Wt 63 kg (138 lb 12.8 oz)   SpO2 98%   Breastfeeding No   BMI 23.82 kg/m      Physical Exam    GENERAL APPEARANCE: healthy, alert and no distress     EYES: EOMI, PERRL     HENT: ear canals and TM's normal and nose and mouth without ulcers or lesions     NECK: no adenopathy, no asymmetry, masses, or scars and thyroid normal to palpation     RESP: lungs clear to auscultation - no rales, rhonchi or wheezes     CV: regular rates and rhythm, normal S1 S2, no S3 or S4 and no murmur, click or rub     ABDOMEN:  soft, nontender, no HSM or masses and bowel sounds  normal     MS: extremities normal- no gross deformities noted, no evidence of inflammation in joints, FROM in all extremities.     SKIN: no suspicious lesions or rashes     NEURO: Normal strength and tone, sensory exam grossly normal, mentation intact and speech normal     PSYCH: mentation appears normal. and affect normal/bright     LYMPHATICS: No cervical adenopathy    Recent Labs   Lab Test 07/07/21  0930 11/27/20  0825 02/20/20  0902   HGB 14.3 14.3 13.9    231 109*   NA  --  138 139   POTASSIUM  --  4.1 4.0   CR  --  0.77 0.72        Diagnostics:  No labs were ordered during this visit.   No EKG required, no history of coronary heart disease, significant arrhythmia, peripheral arterial disease or other structural heart disease.    Revised Cardiac Risk Index (RCRI):  The patient has the following serious cardiovascular risks for perioperative complications:   - No serious cardiac risks = 0 points     RCRI Interpretation: 0 points: Class I (very low risk - 0.4% complication rate)       Signed Electronically by: Kelsey Velez PA-C  Copy of this evaluation report is provided to requesting physician.

## 2021-11-22 NOTE — PATIENT INSTRUCTIONS

## 2021-11-30 ENCOUNTER — LAB (OUTPATIENT)
Dept: LAB | Facility: CLINIC | Age: 41
End: 2021-11-30
Payer: COMMERCIAL

## 2021-11-30 ENCOUNTER — ANESTHESIA EVENT (OUTPATIENT)
Dept: SURGERY | Facility: CLINIC | Age: 41
End: 2021-11-30
Payer: COMMERCIAL

## 2021-11-30 DIAGNOSIS — Z11.59 ENCOUNTER FOR SCREENING FOR OTHER VIRAL DISEASES: ICD-10-CM

## 2021-11-30 PROCEDURE — U0005 INFEC AGEN DETEC AMPLI PROBE: HCPCS

## 2021-11-30 PROCEDURE — U0003 INFECTIOUS AGENT DETECTION BY NUCLEIC ACID (DNA OR RNA); SEVERE ACUTE RESPIRATORY SYNDROME CORONAVIRUS 2 (SARS-COV-2) (CORONAVIRUS DISEASE [COVID-19]), AMPLIFIED PROBE TECHNIQUE, MAKING USE OF HIGH THROUGHPUT TECHNOLOGIES AS DESCRIBED BY CMS-2020-01-R: HCPCS

## 2021-12-01 LAB — SARS-COV-2 RNA RESP QL NAA+PROBE: NEGATIVE

## 2021-12-01 NOTE — ANESTHESIA PREPROCEDURE EVALUATION
Anesthesia Pre-Procedure Evaluation    Patient: Kimberley Choe   MRN: [id number] : 1980        Preoperative Diagnosis: Endometriosis of pelvis [N80.3]    Procedure : Procedure(s):  HYSTERECTOMY, VAGINAL, LAPAROSCOPY-ASSISTED, excision of endometriosis, possible cystoscopy          Past Medical History:   Diagnosis Date     Anemia, unspecified     with pregnancy      Arthritis     Right hip     Celiac disease      Dysmenorrhea 2012     GERD (gastroesophageal reflux disease) 2014     Papanicolaou smear of cervix with low grade squamous intraepithelial lesion (LGSIL)     Colpo negative     Right hip labral tear 2006     Unspecified closed fracture of ankle     Left foot     Unspecified disorder of thyroid 10/20/2002    Thyroid disease, goiter nodules, S/p thyroidectomy       Past Surgical History:   Procedure Laterality Date     BIOPSY      Liver     C HIP ARTHROSCOPY, DX      (R) Anterior superior labral debridement.      ENT SURGERY      Thyroidectomy     ESOPHAGOSCOPY, GASTROSCOPY, DUODENOSCOPY (EGD), COMBINED  2010    EGD     LAPAROSCOPIC ABLATION ENDOMETRIOSIS  10/5/2021    Procedure: cautery of endometriosis;  Surgeon: Tasha Dewey MD;  Location: [facility]     LAPAROSCOPY DIAGNOSTIC (GYN) N/A 10/5/2021    Procedure: Diagnostic laparoscopy;  Surgeon: Tasha Dewey MD;  Location: [facility]     THYROIDECTOMY       S/p thyroidectomy       Allergies   Allergen Reactions     Gluten Other (See Comments)     Celiac disease     Macrobid [Nitrofuran Derivatives] Nausea and Vomiting     Zithromax [Azithromycin Dihydrate] Nausea and Vomiting      Social History     Tobacco Use     Smoking status: Never Smoker     Smokeless tobacco: Never Used   Substance Use Topics     Alcohol use: Yes     Comment: rare      Wt Readings from Last 1 Encounters:   21 63 kg (138 lb 12.8 oz)        Anesthesia Evaluation   Pt has had prior anesthetic. Type:  General and MAC.        ROS/MED HX  ENT/Pulmonary:  - neg pulmonary ROS     Neurologic:  - neg neurologic ROS     Cardiovascular:     (+) -----valvular problems/murmurs Previous cardiac testing   Echo: Date: 2008 Results:  WNL  Stress Test: Date: Results:    ECG Reviewed: Date: 5/23/18 Results:  Sinus Rhythm   Low voltage in precordial leads.    -RSR(V1) -nondiagnostic.    -Left atrial enlargement.     ABNORMAL   Cath: Date: Results:      METS/Exercise Tolerance:     Hematologic:     (+) anemia,     Musculoskeletal:  - neg musculoskeletal ROS (+) arthritis,     GI/Hepatic: Comment: Non-alcoholic fatty liver disease  Celiac disease    (+) GERD, liver disease,     Renal/Genitourinary:  - neg Renal ROS     Endo:     (+) thyroid problem, hypothyroidism,     Psychiatric/Substance Use:  - neg psychiatric ROS     Infectious Disease:  - neg infectious disease ROS     Malignancy:  - neg malignancy ROS     Other:  - neg other ROS          Physical Exam    Airway  airway exam normal      Mallampati: II   TM distance: > 3 FB   Neck ROM: full   Mouth opening: > 3 cm    Respiratory Devices and Support         Dental  no notable dental history         Cardiovascular   cardiovascular exam normal          Pulmonary   pulmonary exam normal                OUTSIDE LABS:  CBC:   Lab Results   Component Value Date    WBC 5.8 07/07/2021    WBC 5.5 11/27/2020    HGB 14.3 07/07/2021    HGB 14.3 11/27/2020    HCT 42.6 07/07/2021    HCT 42.2 11/27/2020     07/07/2021     11/27/2020     BMP:   Lab Results   Component Value Date     11/27/2020     02/20/2020    POTASSIUM 4.1 11/27/2020    POTASSIUM 4.0 02/20/2020    CHLORIDE 105 11/27/2020    CHLORIDE 106 02/20/2020    CO2 29 11/27/2020    CO2 27 02/20/2020    BUN 10 11/27/2020    BUN 7 02/20/2020    CR 0.77 11/27/2020    CR 0.72 02/20/2020    GLC 89 11/27/2020    GLC 94 02/20/2020     COAGS:   Lab Results   Component Value Date    INR 1.03 04/07/2015     POC:   Lab  Results   Component Value Date    HCG Negative 10/05/2021    HCGS Negative 03/12/2021     HEPATIC:   Lab Results   Component Value Date    ALBUMIN 4.4 03/12/2021    PROTTOTAL 7.6 03/12/2021    ALT 54 (H) 03/12/2021    AST 21 03/12/2021    GGT 20 01/08/2019    ALKPHOS 67 03/12/2021    BILITOTAL 0.9 03/12/2021     OTHER:   Lab Results   Component Value Date    LILIAN 8.2 (L) 11/27/2020    LIPASE 143 04/04/2017    TSH 2.52 07/07/2021    T4 1.61 (H) 03/12/2021    CRP <2.9 10/01/2019    SED 4 10/01/2019       Anesthesia Plan    ASA Status:  2   NPO Status:  NPO Appropriate    Anesthesia Type: General.     - Airway: ETT   Induction: Intravenous.   Maintenance: TIVA.        Consents    Anesthesia Plan(s) and associated risks, benefits, and realistic alternatives discussed. Questions answered and patient/representative(s) expressed understanding.    - Discussed:     - Discussed with:  Patient         Postoperative Care    Pain management: Multi-modal analgesia.   PONV prophylaxis: Ondansetron (or other 5HT-3), Dexamethasone or Solumedrol     Comments:                Hao Ferrera CRNA, APRN LUPILLO

## 2021-12-02 ENCOUNTER — ANESTHESIA (OUTPATIENT)
Dept: SURGERY | Facility: CLINIC | Age: 41
End: 2021-12-02
Payer: COMMERCIAL

## 2021-12-02 ENCOUNTER — HOSPITAL ENCOUNTER (OUTPATIENT)
Facility: CLINIC | Age: 41
Discharge: HOME | End: 2021-12-02
Attending: OBSTETRICS & GYNECOLOGY | Admitting: OBSTETRICS & GYNECOLOGY
Payer: COMMERCIAL

## 2021-12-02 VITALS
BODY MASS INDEX: 23.56 KG/M2 | HEIGHT: 64 IN | RESPIRATION RATE: 15 BRPM | SYSTOLIC BLOOD PRESSURE: 112 MMHG | OXYGEN SATURATION: 98 % | HEART RATE: 80 BPM | WEIGHT: 138 LBS | DIASTOLIC BLOOD PRESSURE: 79 MMHG | TEMPERATURE: 99 F

## 2021-12-02 DIAGNOSIS — Z98.890 POSTOPERATIVE STATE: Primary | ICD-10-CM

## 2021-12-02 LAB
BASOPHILS # BLD AUTO: 0 10E3/UL (ref 0–0.2)
BASOPHILS NFR BLD AUTO: 1 %
EOSINOPHIL # BLD AUTO: 0.1 10E3/UL (ref 0–0.7)
EOSINOPHIL NFR BLD AUTO: 1 %
ERYTHROCYTE [DISTWIDTH] IN BLOOD BY AUTOMATED COUNT: 14.1 % (ref 10–15)
HCG UR QL: NEGATIVE
HCT VFR BLD AUTO: 41.3 % (ref 35–47)
HGB BLD-MCNC: 13.7 G/DL (ref 11.7–15.7)
IMM GRANULOCYTES # BLD: 0 10E3/UL
IMM GRANULOCYTES NFR BLD: 0 %
LYMPHOCYTES # BLD AUTO: 1.3 10E3/UL (ref 0.8–5.3)
LYMPHOCYTES NFR BLD AUTO: 28 %
MCH RBC QN AUTO: 27.9 PG (ref 26.5–33)
MCHC RBC AUTO-ENTMCNC: 33.2 G/DL (ref 31.5–36.5)
MCV RBC AUTO: 84 FL (ref 78–100)
MONOCYTES # BLD AUTO: 0.5 10E3/UL (ref 0–1.3)
MONOCYTES NFR BLD AUTO: 11 %
NEUTROPHILS # BLD AUTO: 2.8 10E3/UL (ref 1.6–8.3)
NEUTROPHILS NFR BLD AUTO: 59 %
NRBC # BLD AUTO: 0 10E3/UL
NRBC BLD AUTO-RTO: 0 /100
PLATELET # BLD AUTO: 230 10E3/UL (ref 150–450)
RBC # BLD AUTO: 4.91 10E6/UL (ref 3.8–5.2)
WBC # BLD AUTO: 4.7 10E3/UL (ref 4–11)

## 2021-12-02 PROCEDURE — 250N000011 HC RX IP 250 OP 636: Performed by: OBSTETRICS & GYNECOLOGY

## 2021-12-02 PROCEDURE — 81025 URINE PREGNANCY TEST: CPT | Performed by: OBSTETRICS & GYNECOLOGY

## 2021-12-02 PROCEDURE — 250N000011 HC RX IP 250 OP 636: Performed by: NURSE ANESTHETIST, CERTIFIED REGISTERED

## 2021-12-02 PROCEDURE — 250N000009 HC RX 250: Performed by: OBSTETRICS & GYNECOLOGY

## 2021-12-02 PROCEDURE — 370N000017 HC ANESTHESIA TECHNICAL FEE, PER MIN: Performed by: OBSTETRICS & GYNECOLOGY

## 2021-12-02 PROCEDURE — 250N000013 HC RX MED GY IP 250 OP 250 PS 637: Performed by: NURSE ANESTHETIST, CERTIFIED REGISTERED

## 2021-12-02 PROCEDURE — 88307 TISSUE EXAM BY PATHOLOGIST: CPT | Mod: 26 | Performed by: PATHOLOGY

## 2021-12-02 PROCEDURE — 258N000003 HC RX IP 258 OP 636: Performed by: NURSE ANESTHETIST, CERTIFIED REGISTERED

## 2021-12-02 PROCEDURE — 250N000009 HC RX 250: Performed by: NURSE ANESTHETIST, CERTIFIED REGISTERED

## 2021-12-02 PROCEDURE — 250N000013 HC RX MED GY IP 250 OP 250 PS 637: Performed by: OBSTETRICS & GYNECOLOGY

## 2021-12-02 PROCEDURE — 88305 TISSUE EXAM BY PATHOLOGIST: CPT | Mod: TC | Performed by: OBSTETRICS & GYNECOLOGY

## 2021-12-02 PROCEDURE — 710N000009 HC RECOVERY PHASE 1, LEVEL 1, PER MIN: Performed by: OBSTETRICS & GYNECOLOGY

## 2021-12-02 PROCEDURE — 36415 COLL VENOUS BLD VENIPUNCTURE: CPT | Performed by: OBSTETRICS & GYNECOLOGY

## 2021-12-02 PROCEDURE — 272N000001 HC OR GENERAL SUPPLY STERILE: Performed by: OBSTETRICS & GYNECOLOGY

## 2021-12-02 PROCEDURE — 88305 TISSUE EXAM BY PATHOLOGIST: CPT | Mod: 26 | Performed by: PATHOLOGY

## 2021-12-02 PROCEDURE — 999N000141 HC STATISTIC PRE-PROCEDURE NURSING ASSESSMENT: Performed by: OBSTETRICS & GYNECOLOGY

## 2021-12-02 PROCEDURE — 85025 COMPLETE CBC W/AUTO DIFF WBC: CPT | Performed by: OBSTETRICS & GYNECOLOGY

## 2021-12-02 PROCEDURE — 58552 LAPARO-VAG HYST INCL T/O: CPT | Mod: 80 | Performed by: OBSTETRICS & GYNECOLOGY

## 2021-12-02 PROCEDURE — 58662 LAPAROSCOPY EXCISE LESIONS: CPT | Mod: 80 | Performed by: OBSTETRICS & GYNECOLOGY

## 2021-12-02 PROCEDURE — 58662 LAPAROSCOPY EXCISE LESIONS: CPT | Mod: 51 | Performed by: OBSTETRICS & GYNECOLOGY

## 2021-12-02 PROCEDURE — 360N000077 HC SURGERY LEVEL 4, PER MIN: Performed by: OBSTETRICS & GYNECOLOGY

## 2021-12-02 PROCEDURE — 250N000025 HC SEVOFLURANE, PER MIN: Performed by: OBSTETRICS & GYNECOLOGY

## 2021-12-02 PROCEDURE — 710N000012 HC RECOVERY PHASE 2, PER MINUTE: Performed by: OBSTETRICS & GYNECOLOGY

## 2021-12-02 PROCEDURE — 58552 LAPARO-VAG HYST INCL T/O: CPT | Performed by: OBSTETRICS & GYNECOLOGY

## 2021-12-02 RX ORDER — GABAPENTIN 300 MG/1
300 CAPSULE ORAL
Status: COMPLETED | OUTPATIENT
Start: 2021-12-02 | End: 2021-12-02

## 2021-12-02 RX ORDER — NALOXONE HYDROCHLORIDE 0.4 MG/ML
0.2 INJECTION, SOLUTION INTRAMUSCULAR; INTRAVENOUS; SUBCUTANEOUS
Status: DISCONTINUED | OUTPATIENT
Start: 2021-12-02 | End: 2021-12-02 | Stop reason: HOSPADM

## 2021-12-02 RX ORDER — PROPOFOL 10 MG/ML
INJECTION, EMULSION INTRAVENOUS PRN
Status: DISCONTINUED | OUTPATIENT
Start: 2021-12-02 | End: 2021-12-02

## 2021-12-02 RX ORDER — OXYCODONE HYDROCHLORIDE 5 MG/1
5-10 TABLET ORAL
Status: DISCONTINUED | OUTPATIENT
Start: 2021-12-02 | End: 2021-12-02 | Stop reason: HOSPADM

## 2021-12-02 RX ORDER — ACETAMINOPHEN 325 MG/1
975 TABLET ORAL ONCE
Status: DISCONTINUED | OUTPATIENT
Start: 2021-12-02 | End: 2021-12-02 | Stop reason: HOSPADM

## 2021-12-02 RX ORDER — DEXAMETHASONE SODIUM PHOSPHATE 4 MG/ML
INJECTION, SOLUTION INTRA-ARTICULAR; INTRALESIONAL; INTRAMUSCULAR; INTRAVENOUS; SOFT TISSUE PRN
Status: DISCONTINUED | OUTPATIENT
Start: 2021-12-02 | End: 2021-12-02

## 2021-12-02 RX ORDER — PHENAZOPYRIDINE HYDROCHLORIDE 200 MG/1
200 TABLET, FILM COATED ORAL ONCE
Status: DISCONTINUED | OUTPATIENT
Start: 2021-12-02 | End: 2021-12-02 | Stop reason: HOSPADM

## 2021-12-02 RX ORDER — ONDANSETRON 4 MG/1
4-8 TABLET, ORALLY DISINTEGRATING ORAL EVERY 8 HOURS PRN
Qty: 4 TABLET | Refills: 0 | Status: SHIPPED | OUTPATIENT
Start: 2021-12-02 | End: 2022-01-06

## 2021-12-02 RX ORDER — ACETAMINOPHEN 325 MG/1
975 TABLET ORAL ONCE
Status: COMPLETED | OUTPATIENT
Start: 2021-12-02 | End: 2021-12-02

## 2021-12-02 RX ORDER — FENTANYL CITRATE 50 UG/ML
INJECTION, SOLUTION INTRAMUSCULAR; INTRAVENOUS PRN
Status: DISCONTINUED | OUTPATIENT
Start: 2021-12-02 | End: 2021-12-02

## 2021-12-02 RX ORDER — OXYCODONE HYDROCHLORIDE 5 MG/1
5-10 TABLET ORAL EVERY 4 HOURS PRN
Qty: 12 TABLET | Refills: 0 | Status: SHIPPED | OUTPATIENT
Start: 2021-12-02 | End: 2022-01-06

## 2021-12-02 RX ORDER — IBUPROFEN 800 MG/1
800 TABLET, FILM COATED ORAL ONCE
Status: DISCONTINUED | OUTPATIENT
Start: 2021-12-02 | End: 2021-12-02 | Stop reason: HOSPADM

## 2021-12-02 RX ORDER — LIDOCAINE 40 MG/G
CREAM TOPICAL
Status: DISCONTINUED | OUTPATIENT
Start: 2021-12-02 | End: 2021-12-02 | Stop reason: HOSPADM

## 2021-12-02 RX ORDER — CEFAZOLIN SODIUM 2 G/100ML
2 INJECTION, SOLUTION INTRAVENOUS
Status: DISCONTINUED | OUTPATIENT
Start: 2021-12-02 | End: 2021-12-02 | Stop reason: HOSPADM

## 2021-12-02 RX ORDER — KETAMINE HYDROCHLORIDE 10 MG/ML
INJECTION, SOLUTION INTRAMUSCULAR; INTRAVENOUS PRN
Status: DISCONTINUED | OUTPATIENT
Start: 2021-12-02 | End: 2021-12-02

## 2021-12-02 RX ORDER — BUPIVACAINE HYDROCHLORIDE AND EPINEPHRINE 5; 5 MG/ML; UG/ML
INJECTION, SOLUTION PERINEURAL PRN
Status: DISCONTINUED | OUTPATIENT
Start: 2021-12-02 | End: 2021-12-02 | Stop reason: HOSPADM

## 2021-12-02 RX ORDER — IBUPROFEN 800 MG/1
800 TABLET, FILM COATED ORAL EVERY 6 HOURS PRN
Qty: 30 TABLET | Refills: 0 | Status: SHIPPED | OUTPATIENT
Start: 2021-12-02 | End: 2022-01-06

## 2021-12-02 RX ORDER — TRANEXAMIC ACID 10 MG/ML
1 INJECTION, SOLUTION INTRAVENOUS ONCE
Status: COMPLETED | OUTPATIENT
Start: 2021-12-02 | End: 2021-12-02

## 2021-12-02 RX ORDER — LIDOCAINE HYDROCHLORIDE 10 MG/ML
INJECTION, SOLUTION INFILTRATION; PERINEURAL PRN
Status: DISCONTINUED | OUTPATIENT
Start: 2021-12-02 | End: 2021-12-02

## 2021-12-02 RX ORDER — OXYCODONE HCL 10 MG/1
10 TABLET, FILM COATED, EXTENDED RELEASE ORAL ONCE
Status: COMPLETED | OUTPATIENT
Start: 2021-12-02 | End: 2021-12-02

## 2021-12-02 RX ORDER — NALOXONE HYDROCHLORIDE 0.4 MG/ML
0.4 INJECTION, SOLUTION INTRAMUSCULAR; INTRAVENOUS; SUBCUTANEOUS
Status: DISCONTINUED | OUTPATIENT
Start: 2021-12-02 | End: 2021-12-02 | Stop reason: HOSPADM

## 2021-12-02 RX ORDER — KETOROLAC TROMETHAMINE 30 MG/ML
INJECTION, SOLUTION INTRAMUSCULAR; INTRAVENOUS PRN
Status: DISCONTINUED | OUTPATIENT
Start: 2021-12-02 | End: 2021-12-02

## 2021-12-02 RX ORDER — CEFAZOLIN SODIUM 2 G/100ML
2 INJECTION, SOLUTION INTRAVENOUS SEE ADMIN INSTRUCTIONS
Status: DISCONTINUED | OUTPATIENT
Start: 2021-12-02 | End: 2021-12-02 | Stop reason: HOSPADM

## 2021-12-02 RX ORDER — AMOXICILLIN 250 MG
1-2 CAPSULE ORAL 2 TIMES DAILY
Qty: 30 TABLET | Refills: 0 | Status: SHIPPED | OUTPATIENT
Start: 2021-12-02 | End: 2022-01-06

## 2021-12-02 RX ORDER — HYDROXYZINE HYDROCHLORIDE 25 MG/1
25 TABLET, FILM COATED ORAL
Status: DISCONTINUED | OUTPATIENT
Start: 2021-12-02 | End: 2021-12-02 | Stop reason: HOSPADM

## 2021-12-02 RX ORDER — ONDANSETRON 2 MG/ML
4 INJECTION INTRAMUSCULAR; INTRAVENOUS EVERY 6 HOURS PRN
Status: DISCONTINUED | OUTPATIENT
Start: 2021-12-02 | End: 2021-12-02 | Stop reason: HOSPADM

## 2021-12-02 RX ORDER — SODIUM CHLORIDE, SODIUM LACTATE, POTASSIUM CHLORIDE, CALCIUM CHLORIDE 600; 310; 30; 20 MG/100ML; MG/100ML; MG/100ML; MG/100ML
INJECTION, SOLUTION INTRAVENOUS CONTINUOUS
Status: DISCONTINUED | OUTPATIENT
Start: 2021-12-02 | End: 2021-12-02 | Stop reason: HOSPADM

## 2021-12-02 RX ADMIN — PROPOFOL 200 MCG/KG/MIN: 10 INJECTION, EMULSION INTRAVENOUS at 14:06

## 2021-12-02 RX ADMIN — HYDROMORPHONE HYDROCHLORIDE 0.5 MG: 1 INJECTION, SOLUTION INTRAMUSCULAR; INTRAVENOUS; SUBCUTANEOUS at 14:59

## 2021-12-02 RX ADMIN — TRANEXAMIC ACID 1 G: 10 INJECTION, SOLUTION INTRAVENOUS at 13:51

## 2021-12-02 RX ADMIN — OXYCODONE HYDROCHLORIDE 10 MG: 10 TABLET, FILM COATED, EXTENDED RELEASE ORAL at 11:35

## 2021-12-02 RX ADMIN — ACETAMINOPHEN 975 MG: 325 TABLET, FILM COATED ORAL at 11:34

## 2021-12-02 RX ADMIN — PROPOFOL 150 MG: 10 INJECTION, EMULSION INTRAVENOUS at 13:53

## 2021-12-02 RX ADMIN — LIDOCAINE HYDROCHLORIDE 50 MG: 10 INJECTION, SOLUTION INFILTRATION; PERINEURAL at 13:49

## 2021-12-02 RX ADMIN — SODIUM CHLORIDE, POTASSIUM CHLORIDE, SODIUM LACTATE AND CALCIUM CHLORIDE: 600; 310; 30; 20 INJECTION, SOLUTION INTRAVENOUS at 13:05

## 2021-12-02 RX ADMIN — GABAPENTIN 300 MG: 300 CAPSULE ORAL at 11:35

## 2021-12-02 RX ADMIN — ROCURONIUM BROMIDE 50 MG: 50 INJECTION, SOLUTION INTRAVENOUS at 13:53

## 2021-12-02 RX ADMIN — DEXAMETHASONE SODIUM PHOSPHATE 10 MG: 4 INJECTION, SOLUTION INTRA-ARTICULAR; INTRALESIONAL; INTRAMUSCULAR; INTRAVENOUS; SOFT TISSUE at 13:53

## 2021-12-02 RX ADMIN — SODIUM CHLORIDE, POTASSIUM CHLORIDE, SODIUM LACTATE AND CALCIUM CHLORIDE 1000 ML: 600; 310; 30; 20 INJECTION, SOLUTION INTRAVENOUS at 11:56

## 2021-12-02 RX ADMIN — CEFAZOLIN SODIUM 2 G: 2 INJECTION, SOLUTION INTRAVENOUS at 13:48

## 2021-12-02 RX ADMIN — MIDAZOLAM 2 MG: 1 INJECTION INTRAMUSCULAR; INTRAVENOUS at 13:49

## 2021-12-02 RX ADMIN — HYDROMORPHONE HYDROCHLORIDE 0.5 MG: 1 INJECTION, SOLUTION INTRAMUSCULAR; INTRAVENOUS; SUBCUTANEOUS at 14:44

## 2021-12-02 RX ADMIN — ONDANSETRON 4 MG: 2 INJECTION INTRAMUSCULAR; INTRAVENOUS at 12:16

## 2021-12-02 RX ADMIN — SUGAMMADEX 200 MG: 100 INJECTION, SOLUTION INTRAVENOUS at 15:32

## 2021-12-02 RX ADMIN — FENTANYL CITRATE 100 MCG: 50 INJECTION, SOLUTION INTRAMUSCULAR; INTRAVENOUS at 13:49

## 2021-12-02 RX ADMIN — KETOROLAC TROMETHAMINE 15 MG: 30 INJECTION, SOLUTION INTRAMUSCULAR at 15:09

## 2021-12-02 RX ADMIN — LIDOCAINE HYDROCHLORIDE 0.1 ML: 10 INJECTION, SOLUTION EPIDURAL; INFILTRATION; INTRACAUDAL; PERINEURAL at 11:57

## 2021-12-02 RX ADMIN — KETAMINE HYDROCHLORIDE 30 MG: 10 INJECTION INTRAMUSCULAR; INTRAVENOUS at 13:53

## 2021-12-02 ASSESSMENT — MIFFLIN-ST. JEOR: SCORE: 1275.96

## 2021-12-02 NOTE — DISCHARGE INSTRUCTIONS
"                    Same Day Surgery Discharge Instructions  Special Precautions After Surgery - Adult    1. It is not unusual to feel lightheaded or faint, up to 24 hours after surgery or while taking pain medication.  If you have these symptoms; sit for a few minutes before standing and have someone assist you when getting up.  2. You should rest and relax for the next 24 hours and must have someone stay with you for at least 24 hours after your discharge.  3. DO NOT DRIVE any vehicle or operate mechanical equipment for 24 hours following the end of your surgery.  DO NOT DRIVE while taking narcotic pain medications that have been prescribed by your physician.  If you had a limb operated on, you must be able to use it fully to drive.  4. DO NOT drink alcoholic beverages for 24 hours following surgery or while taking prescription pain medication.  5. Drink clear liquids (apple juice, ginger ale, broth, 7-Up, etc.).  Progress to your regular diet as you feel able.  6. Any questions call your physician and do not make important decisions for 24 hours.    ACTIVITY  ? { :7004472}     INCISIONAL CARE  ? { :6349629}     Call for an appointment to return to the clinic { :9873759}    Medications:  ? { :1400583::\"Follow the instructions on the bottle.\"}     Additional discharge instructions: { :5458711::\"None\"}  __________________________________________________________________________________________________________________________________  IMPORTANT NUMBERS:    [facility] Main Number:  [phone], [phone]  Pharmacy:  [phone]  Same Day Surgery:  [phone], Monday - Friday until 8:30 p.m.  Urgent Care:  [phone]  Emergency Room:  [phone]      [facility]:  [phone]                                                                                 [facility]:  [phone]           "

## 2021-12-02 NOTE — OP NOTE
Procedure Date: 12/02/2021    PREOPERATIVE DIAGNOSIS:  Symptomatic endometriosis of the pelvis.    POSTOPERATIVE DIAGNOSIS:  Symptomatic endometriosis of the pelvis.    PROCEDURES PERFORMED:  1.  Laparoscopically-assisted vaginal hysterectomy with bilateral salpingectomy.  2.  Excision of pelvic peritoneal endometriosis.    SURGEON:  Tasha Dewey MD    ASSISTANT:  Chucky Jean-Baptiste MD    Surgical assistant was vital for a complicated laparoscopic procedure involving laparoscopic retraction, hemostasis and closure.    ANESTHESIA:  General.    FINDINGS:  There were 2 areas of endometriosis along the uterosacral ligaments or medial to the uterosacral ligaments on both the right and left.  The ovaries appeared clear.  There was also an area of endometriosis on the surface of the uterus in the right fundal area.    DESCRIPTION OF PROCEDURE:  After assuring informed consent, the patient was taken to the endoscopy suite, where general anesthetic was induced.  The patient was placed in the dorsal lithotomy position.  The abdomen and vagina were sterilely prepped and draped.  A timeout and fire safety assessment was performed.  A Kronner uterine manipulator was placed within the cervix and a Murguia catheter for bladder drainage.  A 1 cm infraumbilical skin incision was made with a knife with elevation of the anterior abdominal wall, a Veress needle was advanced into the abdominal cavity.  The abdomen was insufflated with carbon dioxide until fully distended.  A 5 mm probe was then advanced into the abdominal cavity.  After assuring intraperitoneal location, the patient was then placed in Trendelenburg position and two 5 mm ports were placed in the right and left lower quadrants, both under direct visualization.  The pelvis was inspected and photodocumented.  Utilizing a LigaSure cautery cut device, the mesosalpinx was serially crossclamped, ligated and divided on both the right and left with the LigaSure cautery cut  device.  The tube was then transected at the level of the uterus and both tubes removed from the field.  Then, in serial fashion, the round ligaments, uteroovarian ligaments, broad ligaments were serially crossclamped, ligated and divided with the LigaSure cautery cut device.  This was carried down to the level of the cardinal ligaments.  The uterine vessels were ligated as visible.  The bladder flap was created in the lower uterine segment with the LigaSure and then bluntly dissected off the cervix.  Legs were then placed in high lithotomy position.  The cervix regrasped with Teagan clamps.  Cervicovaginal junction was then injected with diluted Pitressin and Marcaine solution with epinephrine.  It was then incised circumferentially with sharp and blunt dissection.  The anterior and posterior cul-de-sacs were entered without difficulty.  Uterosacral ligaments were then crossclamped, ligated and divided with a suture, LigaSure fixed to the vaginal apex.  The residual uterosacral and cardinal ligaments were crossclamped, ligated and divided with the LigaSure and then the uterus and cervix removed from the field.  The pedicle lines appeared hemostatic.  Vaginal cuff was then closed in a running locking fashion with 0 Vicryl suture with 2 figure-of-eight sutures in the midline for reinforcement.  Good hemostasis was seen.  The urine was clear.  Gloves were changed.  The abdomen reinsufflated with carbon dioxide and the pelvis irrigated and inspected hemostasis, which was quite satisfactory.  The 2 areas of residual endometriosis along the uterosacral or medial to the uterosacral were then identified.  They were then distracted medially with a Maryland clamp and utilizing LigaSure cautery excised from the peritoneum and submitted for pathologic evaluation.  Good hemostasis was seen in these areas.  The ureters could be seen particularly tracking on the right side and did not appear to be close to the area of excision.   The patient was then placed supine.  The carbon dioxide was allowed to evacuate from the abdomen.  The trocars were removed.  The incisions were closed in subcuticular fashion with 4-0 Monocryl suture and Dermabond.  The Murguia catheter was removed.  The patient was awakened and taken to postanesthesia recovery in stable condition.    ESTIMATED BLOOD LOSS:  25 mL.    SPECIMENS TO LABORATORY:  Uterus, cervix, bilateral fallopian tubes, pelvic peritoneum with endometriosis.    Tasha Dewey MD        D: 2021   T: 2021   MT: [id number]    Name:     MARY FIELDS  MRN:      [id number]        Account:        [id number]   :      1980           Procedure Date: 2021     Document: [id number]

## 2021-12-02 NOTE — ANESTHESIA CARE TRANSFER NOTE
Patient: Kimberley Choe    Procedure: Procedure(s):  HYSTERECTOMY, VAGINAL, LAPAROSCOPY-ASSISTED, bilateral salpingectomy, and excision of endometriosis       Diagnosis: Endometriosis of pelvis [N80.3]  Diagnosis Additional Information: No value filed.    Anesthesia Type:   General     Note:    Oropharynx: spontaneously breathing and oral airway in place  Level of Consciousness: drowsy  Oxygen Supplementation: face mask  Level of Supplemental Oxygen (L/min / FiO2): 6  Independent Airway: airway patency satisfactory and stable  Dentition: dentition unchanged  Vital Signs Stable: post-procedure vital signs reviewed and stable  Report to RN Given: handoff report given  Patient transferred to: PACU    Handoff Report: Identifed the Patient, Identified the Reponsible Provider, Reviewed the pertinent medical history, Discussed the surgical course, Reviewed Intra-OP anesthesia mangement and issues during anesthesia, Set expectations for post-procedure period and Allowed opportunity for questions and acknowledgement of understanding      Vitals:  Vitals Value Taken Time   BP     Temp     Pulse     Resp     SpO2 99 % 12/02/21 1535   Vitals shown include unvalidated device data.    Electronically Signed By: MING Trinidad CRNA  December 2, 2021  3:36 PM

## 2021-12-02 NOTE — BRIEF OP NOTE
[facility]    Brief Operative Note    Pre-operative diagnosis: Endometriosis of pelvis [N80.3]  Post-operative diagnosis same as preop    Procedure: Procedure(s):  HYSTERECTOMY, VAGINAL, LAPAROSCOPY-ASSISTED, bilateral salpingectomy, and excision of endometriosis  Surgeon: Surgeon(s) and Role:     * Tasha Dewey MD - Primary     * Chucky Jean-Baptiste MD - Assisting  Anesthesia: General   Estimated Blood Loss: 25 cc    Drains: None  Specimens:   ID Type Source Tests Collected by Time Destination   1 :  Tissue Uterus, Cervix, Bilateral Fallopian Tubes SURGICAL PATHOLOGY EXAM Tasha Dewey MD 12/2/2021  2:30 PM    2 : Peritoneal endometriosis Tissue Peritoneum SURGICAL PATHOLOGY EXAM Tasha Dewey MD 12/2/2021  3:04 PM      Findings:   None.  Complications: None.  Implants: * No implants in log *

## 2021-12-02 NOTE — ANESTHESIA POSTPROCEDURE EVALUATION
Patient: Kimberley Choe    Procedure: Procedure(s):  HYSTERECTOMY, VAGINAL, LAPAROSCOPY-ASSISTED, bilateral salpingectomy, and excision of endometriosis       Diagnosis:Endometriosis of pelvis [N80.3]  Diagnosis Additional Information: No value filed.    Anesthesia Type:  General    Note:  Disposition: Outpatient   Postop Pain Control: Uneventful            Sign Out: Well controlled pain   PONV: No   Neuro/Psych: Uneventful            Sign Out: Acceptable/Baseline neuro status   Airway/Respiratory: Uneventful            Sign Out: Acceptable/Baseline resp. status   CV/Hemodynamics: Uneventful            Sign Out: Acceptable CV status; No obvious hypovolemia; No obvious fluid overload   Other NRE: NONE   DID A NON-ROUTINE EVENT OCCUR? No           Last vitals:  Vitals Value Taken Time   /68 12/02/21 1615   Temp 37.2  C (99  F) 12/02/21 1537   Pulse 78 12/02/21 1625   Resp 0 12/02/21 1625   SpO2 98 % 12/02/21 1625   Vitals shown include unvalidated device data.    Electronically Signed By: MING Maza CRNA  December 2, 2021  4:52 PM

## 2021-12-07 LAB
PATH REPORT.COMMENTS IMP SPEC: NORMAL
PATH REPORT.FINAL DX SPEC: NORMAL
PATH REPORT.GROSS SPEC: NORMAL
PATH REPORT.MICROSCOPIC SPEC OTHER STN: NORMAL
PATH REPORT.RELEVANT HX SPEC: NORMAL
PHOTO IMAGE: NORMAL

## 2022-01-06 ENCOUNTER — OFFICE VISIT (OUTPATIENT)
Dept: OBGYN | Facility: CLINIC | Age: 42
End: 2022-01-06
Payer: COMMERCIAL

## 2022-01-06 VITALS
HEART RATE: 77 BPM | SYSTOLIC BLOOD PRESSURE: 121 MMHG | WEIGHT: 143 LBS | DIASTOLIC BLOOD PRESSURE: 73 MMHG | BODY MASS INDEX: 24.55 KG/M2 | TEMPERATURE: 97.6 F

## 2022-01-06 DIAGNOSIS — Z98.890 POSTOPERATIVE STATE: Primary | ICD-10-CM

## 2022-01-06 DIAGNOSIS — N80.9 ENDOMETRIOSIS DETERMINED BY LAPAROSCOPY: ICD-10-CM

## 2022-01-06 PROCEDURE — 99024 POSTOP FOLLOW-UP VISIT: CPT | Performed by: OBSTETRICS & GYNECOLOGY

## 2022-01-06 NOTE — NURSING NOTE
"Initial /73 (BP Location: Right arm, Patient Position: Chair, Cuff Size: Adult Large)   Pulse 77   Temp 97.6  F (36.4  C) (Tympanic)   Wt 64.9 kg (143 lb)   Breastfeeding No   BMI 24.55 kg/m   Estimated body mass index is 24.55 kg/m  as calculated from the following:    Height as of 12/2/21: 1.626 m (5' 4\").    Weight as of this encounter: 64.9 kg (143 lb). .    Acacia Barnard MA    "

## 2022-01-06 NOTE — PROGRESS NOTES
Kimberley is a 41 year old female 4 weeks S/P LAVH and excision of endometriosis, complicated by no problems reported.  She is currently requiring nothing for pain management.  The pathology report showed benign changes. She reports scant vaginal discharge.    Exam: /73 (BP Location: Right arm, Patient Position: Chair, Cuff Size: Adult Large)   Pulse 77   Temp 97.6  F (36.4  C) (Tympanic)   Wt 64.9 kg (143 lb)   Breastfeeding No   BMI 24.55 kg/m     incisions: intact, no erythema, induration or discharge  vaginal cuff intact and non tender    Assessment:  Postop      Plan:may resume normal activities and RETURN TO CLINIC prn  Tasha Dewey MD  [facility]

## 2022-01-09 ENCOUNTER — HEALTH MAINTENANCE LETTER (OUTPATIENT)
Age: 42
End: 2022-01-09

## 2022-02-15 ENCOUNTER — E-VISIT (OUTPATIENT)
Dept: FAMILY MEDICINE | Facility: CLINIC | Age: 42
End: 2022-02-15
Payer: COMMERCIAL

## 2022-02-15 ENCOUNTER — TRANSFERRED RECORDS (OUTPATIENT)
Dept: FAMILY MEDICINE | Facility: CLINIC | Age: 42
End: 2022-02-15

## 2022-02-15 DIAGNOSIS — G47.9 SLEEP DISTURBANCE: Primary | ICD-10-CM

## 2022-02-15 PROCEDURE — 99421 OL DIG E/M SVC 5-10 MIN: CPT | Performed by: PHYSICIAN ASSISTANT

## 2022-02-28 ENCOUNTER — E-VISIT (OUTPATIENT)
Dept: URGENT CARE | Facility: CLINIC | Age: 42
End: 2022-02-28
Payer: COMMERCIAL

## 2022-02-28 DIAGNOSIS — N39.0 ACUTE UTI (URINARY TRACT INFECTION): Primary | ICD-10-CM

## 2022-02-28 PROCEDURE — 99421 OL DIG E/M SVC 5-10 MIN: CPT | Performed by: FAMILY MEDICINE

## 2022-02-28 RX ORDER — SULFAMETHOXAZOLE/TRIMETHOPRIM 800-160 MG
1 TABLET ORAL 2 TIMES DAILY
Qty: 6 TABLET | Refills: 0 | Status: SHIPPED | OUTPATIENT
Start: 2022-02-28 | End: 2022-03-03

## 2022-02-28 NOTE — PATIENT INSTRUCTIONS
Dear Kimberley Choe    After reviewing your responses, I've been able to diagnose you with a urinary tract infection, which is a common infection of the bladder with bacteria.  This is not a sexually transmitted infection, though urinating immediately after intercourse can help prevent infections.  Drinking lots of fluids is also helpful to clear your current infection and prevent the next one.      I have sent a prescription for antibiotics to your pharmacy to treat this infection.    It is important that you take all of your prescribed medication even if your symptoms are improving after a few doses.  Taking all of your medicine helps prevent the symptoms from returning.     If your symptoms worsen, you develop pain in your back or stomach, develop fevers, or are not improving in 5 days, please contact your primary care provider for an appointment or visit any of our convenient Walk-in or Urgent Care Centers to be seen, which can be found on our website here.    Thanks again for choosing us as your health care partner,    Deborah Martin MD

## 2022-03-14 ENCOUNTER — APPOINTMENT (OUTPATIENT)
Dept: CT IMAGING | Facility: CLINIC | Age: 42
End: 2022-03-14
Attending: EMERGENCY MEDICINE
Payer: COMMERCIAL

## 2022-03-14 ENCOUNTER — HOSPITAL ENCOUNTER (EMERGENCY)
Facility: CLINIC | Age: 42
Discharge: HOME | End: 2022-03-14
Attending: EMERGENCY MEDICINE | Admitting: EMERGENCY MEDICINE
Payer: COMMERCIAL

## 2022-03-14 VITALS
TEMPERATURE: 98 F | HEIGHT: 64 IN | RESPIRATION RATE: 18 BRPM | SYSTOLIC BLOOD PRESSURE: 124 MMHG | OXYGEN SATURATION: 99 % | WEIGHT: 132 LBS | BODY MASS INDEX: 22.53 KG/M2 | DIASTOLIC BLOOD PRESSURE: 82 MMHG

## 2022-03-14 DIAGNOSIS — R10.31 ABDOMINAL PAIN, RIGHT LOWER QUADRANT: ICD-10-CM

## 2022-03-14 LAB
ALBUMIN UR-MCNC: NEGATIVE MG/DL
ANION GAP SERPL CALCULATED.3IONS-SCNC: 5 MMOL/L (ref 3–14)
APPEARANCE UR: ABNORMAL
BACTERIA #/AREA URNS HPF: ABNORMAL /HPF
BASOPHILS # BLD AUTO: 0.1 10E3/UL (ref 0–0.2)
BASOPHILS NFR BLD AUTO: 1 %
BILIRUB UR QL STRIP: NEGATIVE
BUN SERPL-MCNC: 14 MG/DL (ref 7–30)
CALCIUM SERPL-MCNC: 8.6 MG/DL (ref 8.5–10.1)
CHLORIDE BLD-SCNC: 109 MMOL/L (ref 94–109)
CO2 SERPL-SCNC: 28 MMOL/L (ref 20–32)
COLOR UR AUTO: ABNORMAL
CREAT SERPL-MCNC: 0.67 MG/DL (ref 0.52–1.04)
EOSINOPHIL # BLD AUTO: 0.1 10E3/UL (ref 0–0.7)
EOSINOPHIL NFR BLD AUTO: 1 %
ERYTHROCYTE [DISTWIDTH] IN BLOOD BY AUTOMATED COUNT: 13.6 % (ref 10–15)
GFR SERPL CREATININE-BSD FRML MDRD: >90 ML/MIN/1.73M2
GLUCOSE BLD-MCNC: 96 MG/DL (ref 70–99)
GLUCOSE UR STRIP-MCNC: NEGATIVE MG/DL
HCT VFR BLD AUTO: 41.7 % (ref 35–47)
HGB BLD-MCNC: 14.3 G/DL (ref 11.7–15.7)
HGB UR QL STRIP: NEGATIVE
HOLD SPECIMEN: NORMAL
IMM GRANULOCYTES # BLD: 0 10E3/UL
IMM GRANULOCYTES NFR BLD: 0 %
KETONES UR STRIP-MCNC: 5 MG/DL
LEUKOCYTE ESTERASE UR QL STRIP: NEGATIVE
LYMPHOCYTES # BLD AUTO: 1.1 10E3/UL (ref 0.8–5.3)
LYMPHOCYTES NFR BLD AUTO: 18 %
MCH RBC QN AUTO: 28.6 PG (ref 26.5–33)
MCHC RBC AUTO-ENTMCNC: 34.3 G/DL (ref 31.5–36.5)
MCV RBC AUTO: 83 FL (ref 78–100)
MONOCYTES # BLD AUTO: 0.6 10E3/UL (ref 0–1.3)
MONOCYTES NFR BLD AUTO: 10 %
MUCOUS THREADS #/AREA URNS LPF: PRESENT /LPF
NEUTROPHILS # BLD AUTO: 4.4 10E3/UL (ref 1.6–8.3)
NEUTROPHILS NFR BLD AUTO: 70 %
NITRATE UR QL: NEGATIVE
NRBC # BLD AUTO: 0 10E3/UL
NRBC BLD AUTO-RTO: 0 /100
PH UR STRIP: 7 [PH] (ref 5–7)
PLATELET # BLD AUTO: 210 10E3/UL (ref 150–450)
POTASSIUM BLD-SCNC: 4 MMOL/L (ref 3.4–5.3)
RBC # BLD AUTO: 5 10E6/UL (ref 3.8–5.2)
RBC URINE: <1 /HPF
SODIUM SERPL-SCNC: 142 MMOL/L (ref 133–144)
SP GR UR STRIP: 1.01 (ref 1–1.03)
SQUAMOUS EPITHELIAL: 22 /HPF
UROBILINOGEN UR STRIP-MCNC: NORMAL MG/DL
WBC # BLD AUTO: 6.2 10E3/UL (ref 4–11)
WBC URINE: <1 /HPF

## 2022-03-14 PROCEDURE — 99285 EMERGENCY DEPT VISIT HI MDM: CPT | Mod: 25 | Performed by: EMERGENCY MEDICINE

## 2022-03-14 PROCEDURE — 99284 EMERGENCY DEPT VISIT MOD MDM: CPT | Performed by: EMERGENCY MEDICINE

## 2022-03-14 PROCEDURE — 74176 CT ABD & PELVIS W/O CONTRAST: CPT

## 2022-03-14 PROCEDURE — 96374 THER/PROPH/DIAG INJ IV PUSH: CPT | Performed by: EMERGENCY MEDICINE

## 2022-03-14 PROCEDURE — 258N000003 HC RX IP 258 OP 636: Performed by: EMERGENCY MEDICINE

## 2022-03-14 PROCEDURE — 96361 HYDRATE IV INFUSION ADD-ON: CPT | Performed by: EMERGENCY MEDICINE

## 2022-03-14 PROCEDURE — 85025 COMPLETE CBC W/AUTO DIFF WBC: CPT | Performed by: EMERGENCY MEDICINE

## 2022-03-14 PROCEDURE — 250N000011 HC RX IP 250 OP 636: Performed by: EMERGENCY MEDICINE

## 2022-03-14 PROCEDURE — 36415 COLL VENOUS BLD VENIPUNCTURE: CPT | Performed by: EMERGENCY MEDICINE

## 2022-03-14 PROCEDURE — 81001 URINALYSIS AUTO W/SCOPE: CPT | Performed by: EMERGENCY MEDICINE

## 2022-03-14 PROCEDURE — 82310 ASSAY OF CALCIUM: CPT | Performed by: EMERGENCY MEDICINE

## 2022-03-14 RX ORDER — KETOROLAC TROMETHAMINE 15 MG/ML
15 INJECTION, SOLUTION INTRAMUSCULAR; INTRAVENOUS ONCE
Status: COMPLETED | OUTPATIENT
Start: 2022-03-14 | End: 2022-03-14

## 2022-03-14 RX ADMIN — KETOROLAC TROMETHAMINE 15 MG: 15 INJECTION, SOLUTION INTRAMUSCULAR; INTRAVENOUS at 11:13

## 2022-03-14 RX ADMIN — SODIUM CHLORIDE 1000 ML: 9 INJECTION, SOLUTION INTRAVENOUS at 11:12

## 2022-03-14 NOTE — DISCHARGE INSTRUCTIONS
Tylenol/ibuprofen for discomfort    Diet and activity as tolerated    Return/recheck for persistent pain, vomiting, fever or any other concern

## 2022-03-14 NOTE — ED PROVIDER NOTES
History     Chief Complaint   Patient presents with     Abdominal Pain     RLQ, onset 0900 today.  nauseated without emesis.  Pt had a hysterectomy in Dec 2021, she did keep her ovaries.     HPI  Kimberley Choe is a 41 year old female who presents with right flank and right lower quadrant pain fairly abrupt onset about 9:00 this morning, described as severe waxing and waning got better on the way in but that worse when she got here.  Nausea without vomiting.  Bowel movements have been baseline formed.  Denies urinary symptoms.  No prior such discomfort.  She did take some Tums last evening secondary to mild indigestion/stomach upset.  Denies fevers chills or sweats.  Recent reported urinary tract infection treated with 3 days of Bactrim, urinary tract symptoms resolved post treatment, she does report she had about 1/2 hours worth of right flank pain during that time.  She was in [place] return here recently, on the way she had 3 hours of vomiting that cleared without associated pain.  Prior abdominal surgery partial hysterectomy secondary to endometriosis, no oophorectomy.  Denies long history of ovarian cyst.  Does not smoke occasional alcohol no illicit substance use reported.    Allergies:  Allergies   Allergen Reactions     Gluten Other (See Comments)     Celiac disease     Macrobid [Nitrofuran Derivatives] Nausea and Vomiting     Zithromax [Azithromycin Dihydrate] Nausea and Vomiting       Problem List:    Patient Active Problem List    Diagnosis Date Noted     Endometriosis determined by laparoscopy 10/05/2021     Priority: Medium     S/p LAPAROSCOPIC ASSISTED VAGINAL HYSTERECTOMY, bilat salpingectomy, excision of endometriosis 12/21       Pelvic pain in female 06/03/2021     Priority: Medium     Iron deficiency anemia, unspecified iron deficiency anemia type 12/13/2017     Priority: Medium     Fatty liver disease, nonalcoholic 10/03/2013     Priority: Medium     Celiac disease 11/10/2010     Priority:  Medium     CARDIOVASCULAR SCREENING; LDL GOAL LESS THAN 160 10/31/2010     Priority: Medium     Anemia 06/17/2010     Priority: Medium     Better after diagnosis of celiac disease       Family history of congenital heart disease 08/29/2009     Priority: Medium     Overview:   evaluated  with  stress and  echo  2007       Vitamin D deficiency 06/23/2009     Priority: Medium     Undiagnosed cardiac murmurs 03/07/2008     Priority: Medium     Echo ok.  No prophylactic antibiotics required       Hypothyroidism 12/20/2005     Priority: Medium     S/p thyroidectomy 2002          Past Medical History:    Past Medical History:   Diagnosis Date     Anemia, unspecified      Arthritis 2006     Celiac disease      Dysmenorrhea 2/1/2012     GERD (gastroesophageal reflux disease) 1/30/2014     Papanicolaou smear of cervix with low grade squamous intraepithelial lesion (LGSIL) 7/09     Right hip labral tear 8/24/2006     Unspecified closed fracture of ankle      Unspecified disorder of thyroid 10/20/2002       Past Surgical History:    Past Surgical History:   Procedure Laterality Date     BIOPSY  2014    Liver     ENT SURGERY  2000    Thyroidectomy     ESOPHAGOSCOPY, GASTROSCOPY, DUODENOSCOPY (EGD), COMBINED  11/01/2010    EGD     LAPAROSCOPIC ABLATION ENDOMETRIOSIS  10/05/2021    Procedure: cautery of endometriosis;  Surgeon: Tasha Dewey MD;  Location: [facility] OR     LAPAROSCOPIC ASSISTED HYSTERECTOMY VAGINAL N/A 12/02/2021    LAVH-bilat salpingectomy, removal of endometriosis     LAPAROSCOPY DIAGNOSTIC (GYN) N/A 10/05/2021    Procedure: Diagnostic laparoscopy;  Surgeon: Tasha Dewey MD;  Location: [facility] OR     THYROIDECTOMY   2002    S/p thyroidectomy 2002     ZZC HIP ARTHROSCOPY, DX  06/2008    (R) Anterior superior labral debridement.        Family History:    Family History   Problem Relation Age of Onset     Gastrointestinal Disease Mother         celiac     Liver Disease Mother         PVC      "Hypertension Father      Arthritis Father      Heart Disease Father         cardimyopthy age 60, arrythmias     Lipids Father      Neurologic Disorder Sister         brain tumor     Diabetes Maternal Grandmother      Alcohol/Drug Maternal Grandmother      Gastrointestinal Disease Paternal Grandmother         IBS     Hypertension Paternal Grandmother      Cerebrovascular Disease Paternal Grandmother      Alzheimer Disease Paternal Grandmother      C.A.D. No family hx of      Breast Cancer No family hx of      Cancer - colorectal No family hx of      Anesthesia Reaction No family hx of      Bleeding Disorder No family hx of        Social History:  Marital Status:   [2]  Social History     Tobacco Use     Smoking status: Never Smoker     Smokeless tobacco: Never Used   Substance Use Topics     Alcohol use: Yes     Comment: rare     Drug use: No        Medications:    EUTHYROX 150 MCG tablet          Review of Systems  All other systems reviewed and are negative.  Not vaccinated for Covid, reports previous Covid infection.  Physical Exam   BP: 124/82  Temp: 98  F (36.7  C)  Resp: 18  Height: 162.6 cm (5' 4\")  Weight: 59.9 kg (132 lb)  SpO2: 99 %      Physical Exam  Nontoxic appearing no respiratory distress alert and oriented ×3  Head atraumatic normocephalic  Moves freely on the gurney going from sitting to supine back to sitting.  Neck supple full active painless range of motion  Lungs clear to auscultation  No CVA tenderness, no rash along the right flank  Heart regular no murmur  Abdomen soft bowel sounds diminished, nondistended, moderate tenderness right lower quadrant with voluntary guarding no rebound  Strength and sensation grossly intact throughout the extremities, gait and station normal  Speech is fluent, good eye contact, thought processes are rational  Lower extremities without swelling, redness or tenderness  Pedal pulses symmetrical and strong    ED Course                 Procedures          "       Critical Care time:  none               Results for orders placed or performed during the hospital encounter of 03/14/22 (from the past 24 hour(s))   CBC with platelets differential    Narrative    The following orders were created for panel order CBC with platelets differential.  Procedure                               Abnormality         Status                     ---------                               -----------         ------                     CBC with platelets and d...[[id number]]                      Final result                 Please view results for these tests on the individual orders.   Basic metabolic panel   Result Value Ref Range    Sodium 142 133 - 144 mmol/L    Potassium 4.0 3.4 - 5.3 mmol/L    Chloride 109 94 - 109 mmol/L    Carbon Dioxide (CO2) 28 20 - 32 mmol/L    Anion Gap 5 3 - 14 mmol/L    Urea Nitrogen 14 7 - 30 mg/dL    Creatinine 0.67 0.52 - 1.04 mg/dL    Calcium 8.6 8.5 - 10.1 mg/dL    Glucose 96 70 - 99 mg/dL    GFR Estimate >90 >60 mL/min/1.73m2   [facility] Draw    Narrative    The following orders were created for panel order [facility] Draw.  Procedure                               Abnormality         Status                     ---------                               -----------         ------                     Extra Blue Top Tube[[id number]]                              Final result               Extra Red Top Tube[[id number]]                               Final result               Extra Green Top (Lithium...[[id number]]                      Final result               Extra Purple Top Tube[[id number]]                            Final result                 Please view results for these tests on the individual orders.   CBC with platelets and differential   Result Value Ref Range    WBC Count 6.2 4.0 - 11.0 10e3/uL    RBC Count 5.00 3.80 - 5.20 10e6/uL    Hemoglobin 14.3 11.7 - 15.7 g/dL    Hematocrit 41.7 35.0 - 47.0 %    MCV 83 78 - 100 fL    MCH 28.6 26.5 - 33.0 pg    MCHC 34.3  31.5 - 36.5 g/dL    RDW 13.6 10.0 - 15.0 %    Platelet Count 210 150 - 450 10e3/uL    % Neutrophils 70 %    % Lymphocytes 18 %    % Monocytes 10 %    % Eosinophils 1 %    % Basophils 1 %    % Immature Granulocytes 0 %    NRBCs per 100 WBC 0 <1 /100    Absolute Neutrophils 4.4 1.6 - 8.3 10e3/uL    Absolute Lymphocytes 1.1 0.8 - 5.3 10e3/uL    Absolute Monocytes 0.6 0.0 - 1.3 10e3/uL    Absolute Eosinophils 0.1 0.0 - 0.7 10e3/uL    Absolute Basophils 0.1 0.0 - 0.2 10e3/uL    Absolute Immature Granulocytes 0.0 <=0.4 10e3/uL    Absolute NRBCs 0.0 10e3/uL   Extra Blue Top Tube   Result Value Ref Range    Hold Specimen JIC    Extra Red Top Tube   Result Value Ref Range    Hold Specimen JIC    Extra Green Top (Lithium Heparin) Tube   Result Value Ref Range    Hold Specimen JIC    Extra Purple Top Tube   Result Value Ref Range    Hold Specimen JIC    CT Abdomen Pelvis w/o Contrast    Narrative    CT ABDOMEN AND PELVIS WITHOUT CONTRAST  3/14/2022 11:55 AM    CLINICAL HISTORY: Right lower quadrant abdominal pain, appendicitis  suspected (Age >= 14y).    TECHNIQUE: CT scan of the abdomen and pelvis was performed without IV  contrast. Multiplanar reformats were obtained. Dose reduction  techniques were used.  CONTRAST: None.    COMPARISON: CT abdomen and pelvis 6/30/2013.    FINDINGS:   LOWER CHEST: Normal.    HEPATOBILIARY: Hepatic steatosis. No acute abnormality. Gallbladder is  unremarkable.    PANCREAS: Normal.    SPLEEN: Normal.    ADRENAL GLANDS: Normal.    KIDNEYS/BLADDER: No urolithiasis or hydronephrosis. No specific acute  renal abnormality.    BOWEL: The appendix cannot be visualized. No convincing signs of  appendicitis within the limits of unenhanced scanning. No bowel  obstruction or focal inflammation. No abscess or free air.    LYMPH NODES: Normal.    VASCULATURE: Unremarkable.    PELVIC ORGANS: Uterus is absent. Ovaries show no acute abnormalities.  A small left ovarian cyst is suggested that is approximately  1.6 cm,  series 5 image 177.    OTHER: None.    MUSCULOSKELETAL: Normal.      Impression    IMPRESSION:   1.  No specific acute abnormality. Please note that the appendix  cannot be visualized within the limits of this exam. If symptoms  progress, repeat scanning could be performed.  2.  Small left ovarian cyst.  3.  Fatty liver.   UA with Microscopic reflex to Culture    Specimen: Urine, Midstream   Result Value Ref Range    Color Urine Straw Colorless, Straw, Light Yellow, Yellow    Appearance Urine Slightly Cloudy (A) Clear    Glucose Urine Negative Negative mg/dL    Bilirubin Urine Negative Negative    Ketones Urine 5  (A) Negative mg/dL    Specific Gravity Urine 1.014 1.003 - 1.035    Blood Urine Negative Negative    pH Urine 7.0 5.0 - 7.0    Protein Albumin Urine Negative Negative mg/dL    Urobilinogen Urine Normal Normal, 2.0 mg/dL    Nitrite Urine Negative Negative    Leukocyte Esterase Urine Negative Negative    Bacteria Urine Few (A) None Seen /HPF    Mucus Urine Present (A) None Seen /LPF    RBC Urine <1 <=2 /HPF    WBC Urine <1 <=5 /HPF    Squamous Epithelials Urine 22 (H) <=1 /HPF    Narrative    Urine Culture not indicated       Medications   ketorolac (TORADOL) injection 15 mg (15 mg Intravenous Given 3/14/22 1113)   0.9% sodium chloride BOLUS (0 mLs Intravenous Stopped 3/14/22 1318)       Assessments & Plan (with Medical Decision Making)Patient presents with abdominal pain as detailed per HPI.  Usual differential considered including but not limited to peptic ulcer disease, bowel obstruction, biliary disease, intra-abdominal infection, ischemia, urinary tract infection, urinary tract stone, diverticulitis, appendicitis, bowel perforation versus other.  Work-up including CBC, CMP, lipase and urinalysis all within normal limits.  CT scan abdomen pelvis without contrast is negative for acute finding    Reexamination at 1350, pain is completely relieved, there remains some mild tenderness to the right  mid/right lower quadrant without guarding or rebound.  Discussed watchful waiting, discussed return criteria.  Patient expressed understanding all questions answered.     I have reviewed the nursing notes.    I have reviewed the findings, diagnosis, plan and need for follow up with the patient.       New Prescriptions    No medications on file       Final diagnoses:   Abdominal pain, right lower quadrant       3/14/2022   [facility] EMERGENCY DEPT     Shaq Fong MD  03/14/22 9543

## 2022-10-18 DIAGNOSIS — E89.0 POSTOPERATIVE HYPOTHYROIDISM: ICD-10-CM

## 2022-10-18 RX ORDER — LEVOTHYROXINE SODIUM 150 UG/1
150 TABLET ORAL DAILY
Qty: 90 TABLET | Refills: 0 | Status: SHIPPED | OUTPATIENT
Start: 2022-10-18 | End: 2022-12-01 | Stop reason: DRUGHIGH

## 2022-10-27 ENCOUNTER — TRANSFERRED RECORDS (OUTPATIENT)
Dept: HEALTH INFORMATION MANAGEMENT | Facility: CLINIC | Age: 42
End: 2022-10-27

## 2022-11-13 ENCOUNTER — TRANSFERRED RECORDS (OUTPATIENT)
Dept: FAMILY MEDICINE | Facility: CLINIC | Age: 42
End: 2022-11-13

## 2022-11-21 ENCOUNTER — HEALTH MAINTENANCE LETTER (OUTPATIENT)
Age: 42
End: 2022-11-21

## 2022-11-30 ENCOUNTER — ANCILLARY PROCEDURE (OUTPATIENT)
Dept: GENERAL RADIOLOGY | Facility: CLINIC | Age: 42
End: 2022-11-30
Attending: FAMILY MEDICINE
Payer: COMMERCIAL

## 2022-11-30 ENCOUNTER — OFFICE VISIT (OUTPATIENT)
Dept: FAMILY MEDICINE | Facility: CLINIC | Age: 42
End: 2022-11-30
Payer: COMMERCIAL

## 2022-11-30 VITALS
OXYGEN SATURATION: 98 % | BODY MASS INDEX: 24.41 KG/M2 | SYSTOLIC BLOOD PRESSURE: 98 MMHG | RESPIRATION RATE: 16 BRPM | WEIGHT: 143 LBS | HEART RATE: 78 BPM | HEIGHT: 64 IN | TEMPERATURE: 97.8 F | DIASTOLIC BLOOD PRESSURE: 72 MMHG

## 2022-11-30 DIAGNOSIS — M25.551 CHRONIC HIP PAIN, RIGHT: ICD-10-CM

## 2022-11-30 DIAGNOSIS — G89.29 CHRONIC HIP PAIN, RIGHT: ICD-10-CM

## 2022-11-30 DIAGNOSIS — E89.0 POSTOPERATIVE HYPOTHYROIDISM: ICD-10-CM

## 2022-11-30 DIAGNOSIS — Z13.220 LIPID SCREENING: ICD-10-CM

## 2022-11-30 DIAGNOSIS — Z00.00 ROUTINE GENERAL MEDICAL EXAMINATION AT A HEALTH CARE FACILITY: Primary | ICD-10-CM

## 2022-11-30 DIAGNOSIS — Z12.31 ENCOUNTER FOR SCREENING MAMMOGRAM FOR BREAST CANCER: ICD-10-CM

## 2022-11-30 DIAGNOSIS — Z86.2 HISTORY OF ANEMIA: ICD-10-CM

## 2022-11-30 DIAGNOSIS — Z23 NEED FOR TDAP VACCINATION: ICD-10-CM

## 2022-11-30 LAB
ALBUMIN SERPL-MCNC: 4.3 G/DL (ref 3.4–5)
ALP SERPL-CCNC: 58 U/L (ref 40–150)
ALT SERPL W P-5'-P-CCNC: 25 U/L (ref 0–50)
ANION GAP SERPL CALCULATED.3IONS-SCNC: 6 MMOL/L (ref 3–14)
AST SERPL W P-5'-P-CCNC: 14 U/L (ref 0–45)
BILIRUB SERPL-MCNC: 0.8 MG/DL (ref 0.2–1.3)
BUN SERPL-MCNC: 16 MG/DL (ref 7–30)
CALCIUM SERPL-MCNC: 8.6 MG/DL (ref 8.5–10.1)
CHLORIDE BLD-SCNC: 107 MMOL/L (ref 94–109)
CHOLEST SERPL-MCNC: 176 MG/DL
CO2 SERPL-SCNC: 25 MMOL/L (ref 20–32)
CREAT SERPL-MCNC: 0.66 MG/DL (ref 0.52–1.04)
ERYTHROCYTE [DISTWIDTH] IN BLOOD BY AUTOMATED COUNT: 13.2 % (ref 10–15)
FASTING STATUS PATIENT QL REPORTED: YES
FERRITIN SERPL-MCNC: 28 NG/ML (ref 12–150)
GFR SERPL CREATININE-BSD FRML MDRD: >90 ML/MIN/1.73M2
GLUCOSE BLD-MCNC: 104 MG/DL (ref 70–99)
HCT VFR BLD AUTO: 44 % (ref 35–47)
HDLC SERPL-MCNC: 58 MG/DL
HGB BLD-MCNC: 15.2 G/DL (ref 11.7–15.7)
LDLC SERPL CALC-MCNC: 102 MG/DL
MCH RBC QN AUTO: 29 PG (ref 26.5–33)
MCHC RBC AUTO-ENTMCNC: 34.5 G/DL (ref 31.5–36.5)
MCV RBC AUTO: 84 FL (ref 78–100)
NONHDLC SERPL-MCNC: 118 MG/DL
PLATELET # BLD AUTO: 220 10E3/UL (ref 150–450)
POTASSIUM BLD-SCNC: 4 MMOL/L (ref 3.4–5.3)
PROT SERPL-MCNC: 7.8 G/DL (ref 6.8–8.8)
RBC # BLD AUTO: 5.25 10E6/UL (ref 3.8–5.2)
SODIUM SERPL-SCNC: 138 MMOL/L (ref 133–144)
T4 FREE SERPL-MCNC: 1.23 NG/DL (ref 0.76–1.46)
TRIGL SERPL-MCNC: 82 MG/DL
TSH SERPL DL<=0.005 MIU/L-ACNC: 6.59 MU/L (ref 0.4–4)
WBC # BLD AUTO: 4.8 10E3/UL (ref 4–11)

## 2022-11-30 PROCEDURE — 84443 ASSAY THYROID STIM HORMONE: CPT | Performed by: FAMILY MEDICINE

## 2022-11-30 PROCEDURE — 82728 ASSAY OF FERRITIN: CPT | Performed by: FAMILY MEDICINE

## 2022-11-30 PROCEDURE — 90471 IMMUNIZATION ADMIN: CPT | Performed by: FAMILY MEDICINE

## 2022-11-30 PROCEDURE — 80061 LIPID PANEL: CPT | Performed by: FAMILY MEDICINE

## 2022-11-30 PROCEDURE — 99214 OFFICE O/P EST MOD 30 MIN: CPT | Mod: 25 | Performed by: FAMILY MEDICINE

## 2022-11-30 PROCEDURE — 85027 COMPLETE CBC AUTOMATED: CPT | Performed by: FAMILY MEDICINE

## 2022-11-30 PROCEDURE — 90715 TDAP VACCINE 7 YRS/> IM: CPT | Performed by: FAMILY MEDICINE

## 2022-11-30 PROCEDURE — 36415 COLL VENOUS BLD VENIPUNCTURE: CPT | Performed by: FAMILY MEDICINE

## 2022-11-30 PROCEDURE — 80053 COMPREHEN METABOLIC PANEL: CPT | Performed by: FAMILY MEDICINE

## 2022-11-30 PROCEDURE — 84439 ASSAY OF FREE THYROXINE: CPT | Performed by: FAMILY MEDICINE

## 2022-11-30 PROCEDURE — 99396 PREV VISIT EST AGE 40-64: CPT | Mod: 25 | Performed by: FAMILY MEDICINE

## 2022-11-30 PROCEDURE — 73502 X-RAY EXAM HIP UNI 2-3 VIEWS: CPT | Mod: TC | Performed by: RADIOLOGY

## 2022-11-30 RX ORDER — LEVOTHYROXINE SODIUM 150 UG/1
150 TABLET ORAL DAILY
Qty: 90 TABLET | Refills: 0 | Status: CANCELLED | OUTPATIENT
Start: 2022-11-30

## 2022-11-30 ASSESSMENT — ENCOUNTER SYMPTOMS
HEMATURIA: 0
HEMATOCHEZIA: 0
FEVER: 0
DIARRHEA: 0
MYALGIAS: 0
BREAST MASS: 0
FREQUENCY: 0
DIZZINESS: 1
COUGH: 0
SHORTNESS OF BREATH: 0
EYE PAIN: 0
HEARTBURN: 0
JOINT SWELLING: 0
PALPITATIONS: 0
DYSURIA: 0
WEAKNESS: 0
CONSTIPATION: 0
CHILLS: 0
HEADACHES: 1
PARESTHESIAS: 0
ARTHRALGIAS: 1
ABDOMINAL PAIN: 0
NAUSEA: 0
SORE THROAT: 0
NERVOUS/ANXIOUS: 0

## 2022-11-30 ASSESSMENT — PAIN SCALES - GENERAL: PAINLEVEL: NO PAIN (0)

## 2022-12-01 ENCOUNTER — TELEPHONE (OUTPATIENT)
Dept: FAMILY MEDICINE | Facility: CLINIC | Age: 42
End: 2022-12-01

## 2022-12-01 DIAGNOSIS — E89.0 POSTOPERATIVE HYPOTHYROIDISM: Primary | ICD-10-CM

## 2022-12-01 RX ORDER — LEVOTHYROXINE SODIUM 175 UG/1
175 TABLET ORAL DAILY
Qty: 30 TABLET | Refills: 1 | Status: SHIPPED | OUTPATIENT
Start: 2022-12-01 | End: 2023-03-01

## 2022-12-01 NOTE — TELEPHONE ENCOUNTER
See previous results note from Dr. Lepe.    Called patient, no answer, left message on voicemail to call [facility] at [phone].    Karla Pierre RN

## 2022-12-01 NOTE — TELEPHONE ENCOUNTER
----- Message from Coco Lepe MD sent at 12/1/2022 11:06 AM CST -----  [place]-      Your recent labs showed,     Complete blood count was normal.   Complete Metabolic Panel (panel that checks liver function, kidney function and electrolytes) was normal. Glucose was slightly elevated. No evidence of diabetes at this time.   Cholesterol panel was normal.   Ferritin levels doubled compared to a year ago.    Thyroid function tests show that your thyroid function is underactive on the current dose of Euthyrox.   I recommend we increase the dose and repeat labs in about 1-2 months.   New Rx sent.   Future orders completed. You can schedule a lab only appointment.     Please contact me if you have any additional questions or concerns.    Coco Lepe MD

## 2022-12-02 NOTE — TELEPHONE ENCOUNTER
Called patient, no answer, left message on voicemail to call [facility] at [phone].    Third attempt to reach patient; routing to [facility] to send letter.  Karla Pierre RN

## 2022-12-02 NOTE — TELEPHONE ENCOUNTER
Called [phone] (home)     Did they answer the phone: No, left a message on voicemail to return call to the [facility] at [phone].    Emily RN,BSN  Triage Nurse  [facility]: [facility]

## 2022-12-08 ENCOUNTER — ANCILLARY PROCEDURE (OUTPATIENT)
Dept: MAMMOGRAPHY | Facility: OTHER | Age: 42
End: 2022-12-08
Attending: FAMILY MEDICINE
Payer: COMMERCIAL

## 2022-12-08 DIAGNOSIS — Z12.31 ENCOUNTER FOR SCREENING MAMMOGRAM FOR BREAST CANCER: ICD-10-CM

## 2022-12-08 PROCEDURE — 77067 SCR MAMMO BI INCL CAD: CPT | Mod: TC | Performed by: RADIOLOGY

## 2022-12-08 PROCEDURE — 77063 BREAST TOMOSYNTHESIS BI: CPT | Mod: TC | Performed by: RADIOLOGY

## 2022-12-18 ENCOUNTER — TRANSFERRED RECORDS (OUTPATIENT)
Dept: HEALTH INFORMATION MANAGEMENT | Facility: CLINIC | Age: 42
End: 2022-12-18

## 2023-01-10 ENCOUNTER — TRANSFERRED RECORDS (OUTPATIENT)
Dept: HEALTH INFORMATION MANAGEMENT | Facility: CLINIC | Age: 43
End: 2023-01-10

## 2023-02-27 ENCOUNTER — OFFICE VISIT (OUTPATIENT)
Dept: FAMILY MEDICINE | Facility: CLINIC | Age: 43
End: 2023-02-27
Payer: COMMERCIAL

## 2023-02-27 VITALS
WEIGHT: 148 LBS | RESPIRATION RATE: 16 BRPM | HEIGHT: 64 IN | BODY MASS INDEX: 25.27 KG/M2 | SYSTOLIC BLOOD PRESSURE: 110 MMHG | HEART RATE: 67 BPM | DIASTOLIC BLOOD PRESSURE: 68 MMHG | TEMPERATURE: 98.3 F | OXYGEN SATURATION: 98 %

## 2023-02-27 DIAGNOSIS — Z28.21 INFLUENZA VACCINATION DECLINED: ICD-10-CM

## 2023-02-27 DIAGNOSIS — K90.0 CELIAC DISEASE: ICD-10-CM

## 2023-02-27 DIAGNOSIS — E89.0 POSTOPERATIVE HYPOTHYROIDISM: ICD-10-CM

## 2023-02-27 DIAGNOSIS — R10.9 FLANK PAIN: Primary | ICD-10-CM

## 2023-02-27 DIAGNOSIS — Z28.21 COVID-19 VACCINATION DECLINED: ICD-10-CM

## 2023-02-27 DIAGNOSIS — R11.0 NAUSEA: ICD-10-CM

## 2023-02-27 DIAGNOSIS — Z83.79 FAMILY HISTORY OF GALLBLADDER DISEASE: ICD-10-CM

## 2023-02-27 LAB
ALBUMIN UR-MCNC: NEGATIVE MG/DL
APPEARANCE UR: CLEAR
BASOPHILS # BLD AUTO: 0 10E3/UL (ref 0–0.2)
BASOPHILS NFR BLD AUTO: 0 %
BILIRUB UR QL STRIP: NEGATIVE
COLOR UR AUTO: YELLOW
EOSINOPHIL # BLD AUTO: 0.1 10E3/UL (ref 0–0.7)
EOSINOPHIL NFR BLD AUTO: 1 %
ERYTHROCYTE [DISTWIDTH] IN BLOOD BY AUTOMATED COUNT: 13.1 % (ref 10–15)
GLUCOSE UR STRIP-MCNC: NEGATIVE MG/DL
HCT VFR BLD AUTO: 43.5 % (ref 35–47)
HGB BLD-MCNC: 14.9 G/DL (ref 11.7–15.7)
HGB UR QL STRIP: NEGATIVE
KETONES UR STRIP-MCNC: NEGATIVE MG/DL
LEUKOCYTE ESTERASE UR QL STRIP: NEGATIVE
LYMPHOCYTES # BLD AUTO: 1.5 10E3/UL (ref 0.8–5.3)
LYMPHOCYTES NFR BLD AUTO: 25 %
MCH RBC QN AUTO: 29.5 PG (ref 26.5–33)
MCHC RBC AUTO-ENTMCNC: 34.3 G/DL (ref 31.5–36.5)
MCV RBC AUTO: 86 FL (ref 78–100)
MONOCYTES # BLD AUTO: 0.7 10E3/UL (ref 0–1.3)
MONOCYTES NFR BLD AUTO: 11 %
NEUTROPHILS # BLD AUTO: 4 10E3/UL (ref 1.6–8.3)
NEUTROPHILS NFR BLD AUTO: 63 %
NITRATE UR QL: NEGATIVE
PH UR STRIP: 6.5 [PH] (ref 5–7)
PLATELET # BLD AUTO: 216 10E3/UL (ref 150–450)
RBC # BLD AUTO: 5.05 10E6/UL (ref 3.8–5.2)
SP GR UR STRIP: 1.01 (ref 1–1.03)
UROBILINOGEN UR STRIP-ACNC: 0.2 E.U./DL
WBC # BLD AUTO: 6.3 10E3/UL (ref 4–11)

## 2023-02-27 PROCEDURE — 99213 OFFICE O/P EST LOW 20 MIN: CPT | Performed by: NURSE PRACTITIONER

## 2023-02-27 PROCEDURE — 83690 ASSAY OF LIPASE: CPT | Performed by: NURSE PRACTITIONER

## 2023-02-27 PROCEDURE — 36415 COLL VENOUS BLD VENIPUNCTURE: CPT | Performed by: NURSE PRACTITIONER

## 2023-02-27 PROCEDURE — 81003 URINALYSIS AUTO W/O SCOPE: CPT | Performed by: NURSE PRACTITIONER

## 2023-02-27 PROCEDURE — 80050 GENERAL HEALTH PANEL: CPT | Performed by: NURSE PRACTITIONER

## 2023-02-27 ASSESSMENT — ENCOUNTER SYMPTOMS
FEVER: 0
SLEEP DISTURBANCE: 0
ABDOMINAL PAIN: 1
APPETITE CHANGE: 0
HEARTBURN: 0
NAUSEA: 1
DIFFICULTY URINATING: 0
DYSURIA: 0
COUGH: 0
CONSTIPATION: 0
FREQUENCY: 0
DIZZINESS: 0
DIARRHEA: 0
PALPITATIONS: 0
LIGHT-HEADEDNESS: 0
WEAKNESS: 0
FATIGUE: 0
SHORTNESS OF BREATH: 0
NERVOUS/ANXIOUS: 0
VOMITING: 0

## 2023-02-27 ASSESSMENT — PAIN SCALES - GENERAL: PAINLEVEL: MODERATE PAIN (4)

## 2023-02-27 NOTE — PATIENT INSTRUCTIONS
You can call imaging scheduling to set up appointment date, time, and location that works best for you to have -[phone]

## 2023-02-27 NOTE — PROGRESS NOTES
Assessment & Plan   1. Flank pain: Patient is a 42-year-old female with a history of celiac disease, endometriosis, GERD, and fatty liver disease presenting with acute right flank pain with associated nausea and pain radiation to left shoulder after eating.  Differential diagnoses include but are not limited to nephrolithiasis, acute cystitis, cholecystitis, cholelithiasis, and celiac flare.  There is a strong family history of gallbladder disease in both mother and biological sister.  Low suspicion for nephrolithiasis at this time due to absence of blood in UA and lack of bilateral CVA tenderness. UA was negative for leuk esterase and nitrites, ruling out acute cystitis. Work-up includes UA, CBC, CMP, lipase, and abdominal ultrasound.  Results pending.  - UA reflex to Microscopic and Culture  - CBC with Platelets & Differential  - Lipase  - Comprehensive metabolic panel (BMP + Alb, Alk Phos, ALT, AST, Total. Bili, TP)    2. Family history of gallbladder disease: Strong family history of gallbladder disease in mother and biological sister.  CMP and lipase pending.  Patient to schedule abdominal ultrasound.  - US Abdomen Complete; Future  - Lipase  - Comprehensive metabolic panel (BMP + Alb, Alk Phos, ALT, AST, Total. Bili, TP)    3. Nausea: Nausea is tolerable at this time by patient.  She declines offer of prescription for Zofran.  We will reach out if symptoms worsen or fail to improve.    4. Celiac disease  Does follow a gluten-free diet.  Was recently on vacation and attempted to follow-up with diet but oniel have come in contact gluten.    5. COVID-19 vaccination declined: provided education regarding recommendations for eligible immunizations. After receiving education, pt made the informed decision to decline recommended immunizations.     6. Influenza vaccination declined: as above     See Patient Instructions    Return if symptoms worsen or fail to improve.    Gini Guadalupe, TRAVIS STUDENT   [facility]  [place]     Carla Tracey, APRN CNP  [facility] [address] RAYNA Lincoln is a 42 year old presenting for the following health issues:  Flank Pain (Right sided)    Presenting with concerns of right flank pain that has been ongoing for the past few weeks.  Pain is intermittent and described as dull and sharp at times.  She does have associated pelvic cramping prior to urination.  Also associated nausea without vomiting.  She states that she does experience pain that radiates into her left shoulder after she has eaten.  History of celiac disease.    Reports a history of low back pain with sciatica.  She was seeing [facility] orthopedic.  Had an x-ray done with [facility] that showed some degenerative changes to lower back.  She states that this pain is not similar to her current concerns today.    History of gallbladder disease in both mother and sister.    History of Present Illness       Back Pain:  She presents for follow up of back pain. Patient's back pain is a new problem.    Original cause of back pain: other  First noticed back pain: 1-4 weeks ago  Patient feels back pain: comes and goesLocation of back pain:  Right middle of back, left shoulder and right side of waist  Description of back pain: dull ache  Back pain spreads: nowhere    Since patient first noticed back pain, pain is: always present, but gets better and worse  Does back pain interfere with her job:  Not applicable  On a scale of 1-10 (10 being the worst), patient describes pain as:  4  What makes back pain worse: nothing  Acupuncture: not tried  Acetaminophen: not tried  Activity or exercise: not helpful  Chiropractor:  Not tried  Cold: not tried  Heat: not tried  Massage: not tried  Muscle relaxants: not tried  NSAIDS: not tried  Opioids: not tried  Physical Therapy: not tried  Rest: not tried  Steroid Injection: not tried  Stretching: not tried  Surgery: not tried  TENS unit: not tried  Topical pain relievers: not  "tried  Other healthcare providers patient is seeing for back pain: None    She eats 0-1 servings of fruits and vegetables daily.She consumes 0 sweetened beverage(s) daily.She exercises with enough effort to increase her heart rate 20 to 29 minutes per day.  She exercises with enough effort to increase her heart rate 6 days per week. She is missing 1 dose(s) of medications per week.  She is not taking prescribed medications regularly due to remembering to take.     Review of Systems   Constitutional: Negative for appetite change, fatigue and fever.   Respiratory: Negative for cough and shortness of breath.    Cardiovascular: Negative for chest pain, palpitations and peripheral edema.   Gastrointestinal: Positive for abdominal pain (right flank) and nausea. Negative for constipation, diarrhea, heartburn and vomiting.   Genitourinary: Negative for difficulty urinating, dysuria, frequency and urgency.   Skin: Negative for rash.   Neurological: Negative for dizziness, weakness and light-headedness.   Psychiatric/Behavioral: Negative for sleep disturbance. The patient is not nervous/anxious.    ROS otherwise negative      Objective    /68   Pulse 67   Temp 98.3  F (36.8  C) (Tympanic)   Resp 16   Ht 1.626 m (5' 4\")   Wt 67.1 kg (148 lb)   LMP 11/27/2021   SpO2 98%   BMI 25.40 kg/m    Body mass index is 25.4 kg/m .  Physical Exam  Vitals reviewed.   Constitutional:       General: She is not in acute distress.     Appearance: Normal appearance.   HENT:      Head: Normocephalic and atraumatic.   Cardiovascular:      Rate and Rhythm: Normal rate and regular rhythm.      Heart sounds: Normal heart sounds, S1 normal and S2 normal. No murmur heard.  Pulmonary:      Breath sounds: Normal breath sounds. No wheezing.   Abdominal:      General: Abdomen is flat. Bowel sounds are normal. There is no distension.      Palpations: Abdomen is soft. There is no hepatomegaly.      Tenderness: There is abdominal tenderness in " the right upper quadrant and left lower quadrant. There is no right CVA tenderness or left CVA tenderness. Negative signs include Wesley's sign.   Musculoskeletal:      Cervical back: Full passive range of motion without pain, normal range of motion and neck supple.      Lumbar back: Normal. No tenderness. Normal range of motion. Negative right straight leg raise test and negative left straight leg raise test.   Skin:     General: Skin is warm and dry.      Capillary Refill: Capillary refill takes less than 2 seconds.   Neurological:      Mental Status: She is alert.      GCS: GCS eye subscore is 4. GCS verbal subscore is 5. GCS motor subscore is 6.      Sensory: Sensation is intact.      Motor: Motor function is intact.   Psychiatric:         Attention and Perception: Attention normal.         Mood and Affect: Mood and affect normal.        Labs pending        I was present with the NP student who participated in the service and in the documentation of the note. I have verified the history and personally performed the physical exam and medical decision making. I agree with the assessment and plan of care as documented in the note. MING Escobar, NP-C

## 2023-02-28 LAB
ALBUMIN SERPL-MCNC: 4.5 G/DL (ref 3.4–5)
ALP SERPL-CCNC: 55 U/L (ref 40–150)
ALT SERPL W P-5'-P-CCNC: 37 U/L (ref 0–50)
ANION GAP SERPL CALCULATED.3IONS-SCNC: 3 MMOL/L (ref 3–14)
AST SERPL W P-5'-P-CCNC: 17 U/L (ref 0–45)
BILIRUB SERPL-MCNC: 0.7 MG/DL (ref 0.2–1.3)
BUN SERPL-MCNC: 14 MG/DL (ref 7–30)
CALCIUM SERPL-MCNC: 9.1 MG/DL (ref 8.5–10.1)
CHLORIDE BLD-SCNC: 105 MMOL/L (ref 94–109)
CO2 SERPL-SCNC: 30 MMOL/L (ref 20–32)
CREAT SERPL-MCNC: 0.72 MG/DL (ref 0.52–1.04)
GFR SERPL CREATININE-BSD FRML MDRD: >90 ML/MIN/1.73M2
GLUCOSE BLD-MCNC: 100 MG/DL (ref 70–99)
LIPASE SERPL-CCNC: 176 U/L (ref 73–393)
POTASSIUM BLD-SCNC: 4.1 MMOL/L (ref 3.4–5.3)
PROT SERPL-MCNC: 7.5 G/DL (ref 6.8–8.8)
SODIUM SERPL-SCNC: 138 MMOL/L (ref 133–144)
TSH SERPL DL<=0.005 MIU/L-ACNC: 2.18 MU/L (ref 0.4–4)

## 2023-03-01 DIAGNOSIS — E89.0 POSTOPERATIVE HYPOTHYROIDISM: ICD-10-CM

## 2023-03-01 RX ORDER — LEVOTHYROXINE SODIUM 175 UG/1
TABLET ORAL
Qty: 30 TABLET | Refills: 0 | Status: SHIPPED | OUTPATIENT
Start: 2023-03-01 | End: 2023-03-04

## 2023-03-02 ENCOUNTER — ANCILLARY PROCEDURE (OUTPATIENT)
Dept: ULTRASOUND IMAGING | Facility: CLINIC | Age: 43
End: 2023-03-02
Attending: NURSE PRACTITIONER
Payer: COMMERCIAL

## 2023-03-02 DIAGNOSIS — Z83.79 FAMILY HISTORY OF GALLBLADDER DISEASE: ICD-10-CM

## 2023-03-02 PROCEDURE — 76700 US EXAM ABDOM COMPLETE: CPT | Mod: TC | Performed by: RADIOLOGY

## 2023-03-04 DIAGNOSIS — E89.0 POSTOPERATIVE HYPOTHYROIDISM: ICD-10-CM

## 2023-03-04 RX ORDER — LEVOTHYROXINE SODIUM 175 UG/1
175 TABLET ORAL DAILY
Qty: 90 TABLET | Refills: 3 | Status: SHIPPED | OUTPATIENT
Start: 2023-03-04 | End: 2024-04-16

## 2023-03-07 NOTE — PROGRESS NOTES
SUBJECTIVE:   CC: Latonia is an 42 year old who presents for preventive health visit.   Patient has been advised of split billing requirements and indicates understanding: Yes  Healthy Habits:     Getting at least 3 servings of Calcium per day:  NO    Bi-annual eye exam:  Yes    Dental care twice a year:  Yes    Sleep apnea or symptoms of sleep apnea:  None    Diet:  Gluten-free/reduced    Frequency of exercise:  2-3 days/week    Duration of exercise:  15-30 minutes    Taking medications regularly:  Yes    Medication side effects:  None    PHQ-2 Total Score: 0    Additional concerns today:  Yes        Hypothyroidism Follow-up  Postoperative.   Currently on Euthyrox 150 mg/day. Tolerating well. No side effects reported.    Since last visit, patient describes the following symptoms: Weight stable, no hair loss, no skin changes, no constipation, no loose stools    Last TSH-  TSH   Date Value Ref Range Status   07/07/2021 2.52 0.40 - 4.00 mU/L Final         Lab request   Repeat ferritin since hysterectomy in 12/2021. Has a history of anemia.   Pap no longer indicated- hysterectomy was for benign reasons (endometriosis).       Right Hip pain    Onset: on and off x 10 years    Description:   Location: right hip  Character: Dull ache    Intensity: 5/10    Progression of Symptoms: worse    Accompanying Signs & Symptoms:  Other symptoms: radiation of pain to the low back    History:   Previous similar pain: YES- ongoing      Precipitating factors:   Trauma or overuse: no     Alleviating factors:  Improved by: rest/inactivity, Ibu    Therapies Tried and outcome: Nothing      HEALTH CARE MAINTENANCE: Due for Mammogram, Tdap and labs.     Today's PHQ-2 Score:   PHQ-2 ( 1999 [employer]) 11/30/2022   Q1: Little interest or pleasure in doing things 0   Q2: Feeling down, depressed or hopeless 0   PHQ-2 Score 0   PHQ-2 Total Score (12-17 Years)- Positive if 3 or more points; Administer PHQ-A if positive -   Q1: Little interest or  pleasure in doing things Not at all   Q2: Feeling down, depressed or hopeless Not at all   PHQ-2 Score 0           Social History     Tobacco Use     Smoking status: Never     Smokeless tobacco: Never   Substance Use Topics     Alcohol use: Yes     Comment: rare     If you drink alcohol do you typically have >3 drinks per day or >7 drinks per week? No    Alcohol Use 11/30/2022   Prescreen: >3 drinks/day or >7 drinks/week? No   Prescreen: >3 drinks/day or >7 drinks/week? -   No flowsheet data found.    Reviewed orders with patient.  Reviewed health maintenance and updated orders accordingly - Yes  Lab work is in process  Labs reviewed in EPIC  BP Readings from Last 3 Encounters:   11/30/22 98/72   03/14/22 124/82   01/06/22 121/73    Wt Readings from Last 3 Encounters:   11/30/22 64.9 kg (143 lb)   03/14/22 59.9 kg (132 lb)   01/06/22 64.9 kg (143 lb)                  Patient Active Problem List   Diagnosis     Hypothyroidism     Undiagnosed cardiac murmurs     Anemia     CARDIOVASCULAR SCREENING; LDL GOAL LESS THAN 160     Celiac disease     Fatty liver disease, nonalcoholic     Family history of congenital heart disease     Vitamin D deficiency     Iron deficiency anemia, unspecified iron deficiency anemia type     Pelvic pain in female     Endometriosis determined by laparoscopy     Past Surgical History:   Procedure Laterality Date     BIOPSY  2014    Liver     ENT SURGERY  2000    Thyroidectomy     ESOPHAGOSCOPY, GASTROSCOPY, DUODENOSCOPY (EGD), COMBINED  11/01/2010    EGD     LAPAROSCOPIC ABLATION ENDOMETRIOSIS  10/05/2021    Procedure: cautery of endometriosis;  Surgeon: Tasha Dewey MD;  Location: [facility] OR     LAPAROSCOPIC ASSISTED HYSTERECTOMY VAGINAL N/A 12/02/2021    LAVH-bilat salpingectomy, removal of endometriosis     LAPAROSCOPY DIAGNOSTIC (GYN) N/A 10/05/2021    Procedure: Diagnostic laparoscopy;  Surgeon: Tasha Dewey MD;  Location: [facility] OR     THYROIDECTOMY   2002    S/p  thyroidectomy 2002     [facility] HIP ARTHROSCOPY, DX  06/2008    (R) Anterior superior labral debridement.        Social History     Tobacco Use     Smoking status: Never     Smokeless tobacco: Never   Substance Use Topics     Alcohol use: Yes     Comment: rare     Family History   Problem Relation Age of Onset     Gastrointestinal Disease Mother         celiac     Liver Disease Mother         PVC     Hypertension Father      Arthritis Father      Heart Disease Father         cardimyopthy age 60, arrythmias     Lipids Father      Neurologic Disorder Sister         brain tumor     Diabetes Maternal Grandmother      Alcohol/Drug Maternal Grandmother      Gastrointestinal Disease Paternal Grandmother         IBS     Hypertension Paternal Grandmother      Cerebrovascular Disease Paternal Grandmother      Alzheimer Disease Paternal Grandmother      C.A.D. No family hx of      Breast Cancer No family hx of      Cancer - colorectal No family hx of      Anesthesia Reaction No family hx of      Bleeding Disorder No family hx of          Current Outpatient Medications   Medication Sig Dispense Refill     EUTHYROX 150 MCG tablet Take 1 tablet (150 mcg) by mouth daily +++NEED APPOINTMENT+++ 90 tablet 0     Allergies   Allergen Reactions     Gluten Other (See Comments)     Celiac disease     Macrobid [Nitrofuran Derivatives] Nausea and Vomiting     Zithromax [Azithromycin Dihydrate] Nausea and Vomiting       Breast Cancer Screening:    FHS-7: No flowsheet data found.      Pertinent mammograms are reviewed under the imaging tab.    History of abnormal Pap smear: Status post benign hysterectomy. Health Maintenance and Surgical History updated.  PAP / HPV Latest Ref Rng & Units 3/11/2019 3/10/2016 2/22/2013   PAP (Historical) - NIL NIL NIL   HPV16 NEG:Negative Negative Negative -   HPV18 NEG:Negative Negative Negative -   HRHPV NEG:Negative Negative Negative -     Reviewed and updated as needed this visit by clinical staff   Tobacco   "Allergies  Meds              Reviewed and updated as needed this visit by Provider                     Review of Systems   Constitutional: Negative for chills and fever.   HENT: Negative for congestion, ear pain, hearing loss and sore throat.    Eyes: Negative for pain and visual disturbance.   Respiratory: Negative for cough and shortness of breath.    Cardiovascular: Negative for chest pain, palpitations and peripheral edema.   Gastrointestinal: Negative for abdominal pain, constipation, diarrhea, heartburn, hematochezia and nausea.   Breasts:  Negative for tenderness, breast mass and discharge.   Genitourinary: Negative for dysuria, frequency, genital sores, hematuria, pelvic pain, urgency, vaginal bleeding and vaginal discharge.   Musculoskeletal: Positive for arthralgias. Negative for joint swelling and myalgias.   Skin: Negative for rash.   Neurological: Positive for dizziness and headaches. Negative for weakness and paresthesias.   Psychiatric/Behavioral: Negative for mood changes. The patient is not nervous/anxious.           OBJECTIVE:   BP 98/72   Pulse 78   Temp 97.8  F (36.6  C) (Tympanic)   Resp 16   Ht 1.626 m (5' 4\")   Wt 64.9 kg (143 lb)   LMP 11/27/2021   SpO2 98%   BMI 24.55 kg/m    Physical Exam  GENERAL: healthy, alert and no distress  EYES: Eyes grossly normal to inspection, PERRL and conjunctivae and sclerae normal  HENT: ear canals and TM's normal, nose and mouth without ulcers or lesions  NECK: no adenopathy, no asymmetry, masses, or scars and thyroid normal to palpation  RESP: lungs clear to auscultation - no rales, rhonchi or wheezes  BREAST: normal without masses, tenderness or nipple discharge and no palpable axillary masses or adenopathy  CV: regular rate and rhythm, normal S1 S2, no S3 or S4, no murmur, click or rub, no peripheral edema and peripheral pulses strong  ABDOMEN: soft, nontender, no hepatosplenomegaly, no masses and bowel sounds normal  MS: no gross musculoskeletal " defects noted, no edema. FROM of the right hip with discomfort on adduction.   SKIN: no suspicious lesions or rashes  NEURO: Normal strength and tone, mentation intact and speech normal  PSYCH: mentation appears normal, affect normal/bright    Diagnostic Test Results:  Previous Labs reviewed in [facility]  Labs drawn and in process  ASSESSMENT/PLAN:   Kimberley was seen today for physical.    Diagnoses and all orders for this visit:    Routine general medical examination at a health care facility  -     REVIEW OF HEALTH MAINTENANCE PROTOCOL ORDERS  -     Comprehensive metabolic panel (BMP + Alb, Alk Phos, ALT, AST, Total. Bili, TP); Future    Encounter for screening mammogram for breast cancer  -     *MA Screening Digital Bilateral; Future    Lipid screening  -     Lipid panel reflex to direct LDL Fasting; Future    Postoperative hypothyroidism, on Euthyrox  -     TSH WITH FREE T4 REFLEX; Future  -     Refill meds after labs.     History of anemia s/p hysterectomy  -     CBC with platelets; Future  -     Ferritin; Future    Chronic hip pain, right- suspect due to degenerative changes  -     XR Hip Right 2-3 Views; Future  -     Physical Therapy Referral; Future  -     May take OTC Ibuprofen as needed.     Need for Tdap vaccination  -     TDAP VACCINE (Adacel, Boostrix)        Patient has been advised of split billing requirements and indicates understanding: Yes      COUNSELING:  Reviewed preventive health counseling, as reflected in patient instructions       Regular exercise       Healthy diet/nutrition        She reports that she has never smoked. She has never used smokeless tobacco.    Follow up in 1 months- follow up if symptoms persist.  Next Annual Physical due in 11 /2023    Coco Lepe MD  [facility]   This section is complete and the patient is ready for discharge

## 2023-03-11 ENCOUNTER — TRANSFERRED RECORDS (OUTPATIENT)
Dept: HEALTH INFORMATION MANAGEMENT | Facility: CLINIC | Age: 43
End: 2023-03-11

## 2023-03-14 ENCOUNTER — TRANSFERRED RECORDS (OUTPATIENT)
Dept: HEALTH INFORMATION MANAGEMENT | Facility: CLINIC | Age: 43
End: 2023-03-14

## 2023-08-22 ENCOUNTER — TRANSFERRED RECORDS (OUTPATIENT)
Dept: HEALTH INFORMATION MANAGEMENT | Facility: CLINIC | Age: 43
End: 2023-08-22
Payer: COMMERCIAL

## 2023-09-13 ENCOUNTER — E-VISIT (OUTPATIENT)
Dept: FAMILY MEDICINE | Facility: CLINIC | Age: 43
End: 2023-09-13
Payer: COMMERCIAL

## 2023-09-13 DIAGNOSIS — F32.A DEPRESSION, UNSPECIFIED DEPRESSION TYPE: Primary | ICD-10-CM

## 2023-09-13 PROCEDURE — 99207 PR NON-BILLABLE SERV PER CHARTING: CPT | Performed by: NURSE PRACTITIONER

## 2023-09-13 ASSESSMENT — PATIENT HEALTH QUESTIONNAIRE - PHQ9
10. IF YOU CHECKED OFF ANY PROBLEMS, HOW DIFFICULT HAVE THESE PROBLEMS MADE IT FOR YOU TO DO YOUR WORK, TAKE CARE OF THINGS AT HOME, OR GET ALONG WITH OTHER PEOPLE: SOMEWHAT DIFFICULT
SUM OF ALL RESPONSES TO PHQ QUESTIONS 1-9: 13
SUM OF ALL RESPONSES TO PHQ QUESTIONS 1-9: 13

## 2023-09-13 ASSESSMENT — ANXIETY QUESTIONNAIRES
GAD7 TOTAL SCORE: 5
7. FEELING AFRAID AS IF SOMETHING AWFUL MIGHT HAPPEN: SEVERAL DAYS
GAD7 TOTAL SCORE: 5
2. NOT BEING ABLE TO STOP OR CONTROL WORRYING: NOT AT ALL
5. BEING SO RESTLESS THAT IT IS HARD TO SIT STILL: NOT AT ALL
4. TROUBLE RELAXING: SEVERAL DAYS
3. WORRYING TOO MUCH ABOUT DIFFERENT THINGS: SEVERAL DAYS
6. BECOMING EASILY ANNOYED OR IRRITABLE: SEVERAL DAYS
1. FEELING NERVOUS, ANXIOUS, OR ON EDGE: SEVERAL DAYS

## 2023-09-14 ASSESSMENT — ANXIETY QUESTIONNAIRES: GAD7 TOTAL SCORE: 5

## 2023-09-14 ASSESSMENT — PATIENT HEALTH QUESTIONNAIRE - PHQ9: SUM OF ALL RESPONSES TO PHQ QUESTIONS 1-9: 13

## 2023-10-31 ENCOUNTER — PATIENT OUTREACH (OUTPATIENT)
Dept: CARE COORDINATION | Facility: CLINIC | Age: 43
End: 2023-10-31
Payer: COMMERCIAL

## 2023-11-14 ENCOUNTER — PATIENT OUTREACH (OUTPATIENT)
Dept: CARE COORDINATION | Facility: CLINIC | Age: 43
End: 2023-11-14
Payer: COMMERCIAL

## 2024-01-10 ENCOUNTER — ANCILLARY PROCEDURE (OUTPATIENT)
Dept: MAMMOGRAPHY | Facility: OTHER | Age: 44
End: 2024-01-10
Attending: FAMILY MEDICINE
Payer: COMMERCIAL

## 2024-01-10 DIAGNOSIS — Z12.31 VISIT FOR SCREENING MAMMOGRAM: ICD-10-CM

## 2024-01-10 PROCEDURE — 77063 BREAST TOMOSYNTHESIS BI: CPT | Mod: TC | Performed by: RADIOLOGY

## 2024-01-10 PROCEDURE — 77067 SCR MAMMO BI INCL CAD: CPT | Mod: TC | Performed by: RADIOLOGY

## 2024-01-11 ENCOUNTER — OFFICE VISIT (OUTPATIENT)
Dept: FAMILY MEDICINE | Facility: CLINIC | Age: 44
End: 2024-01-11
Payer: COMMERCIAL

## 2024-01-11 VITALS
OXYGEN SATURATION: 97 % | WEIGHT: 142 LBS | TEMPERATURE: 96.7 F | HEIGHT: 64 IN | BODY MASS INDEX: 24.24 KG/M2 | HEART RATE: 80 BPM | RESPIRATION RATE: 16 BRPM | DIASTOLIC BLOOD PRESSURE: 81 MMHG | SYSTOLIC BLOOD PRESSURE: 131 MMHG

## 2024-01-11 DIAGNOSIS — F40.243 FEAR OF FLYING: ICD-10-CM

## 2024-01-11 DIAGNOSIS — R53.83 FATIGUE, UNSPECIFIED TYPE: ICD-10-CM

## 2024-01-11 DIAGNOSIS — E03.9 ACQUIRED HYPOTHYROIDISM: Primary | ICD-10-CM

## 2024-01-11 LAB
ERYTHROCYTE [DISTWIDTH] IN BLOOD BY AUTOMATED COUNT: 11.9 % (ref 10–15)
HCT VFR BLD AUTO: 44.4 % (ref 35–47)
HGB BLD-MCNC: 15.5 G/DL (ref 11.7–15.7)
MCH RBC QN AUTO: 29.8 PG (ref 26.5–33)
MCHC RBC AUTO-ENTMCNC: 34.9 G/DL (ref 31.5–36.5)
MCV RBC AUTO: 85 FL (ref 78–100)
PLATELET # BLD AUTO: 262 10E3/UL (ref 150–450)
RBC # BLD AUTO: 5.21 10E6/UL (ref 3.8–5.2)
WBC # BLD AUTO: 7.3 10E3/UL (ref 4–11)

## 2024-01-11 PROCEDURE — 99214 OFFICE O/P EST MOD 30 MIN: CPT | Performed by: PHYSICIAN ASSISTANT

## 2024-01-11 PROCEDURE — 84443 ASSAY THYROID STIM HORMONE: CPT | Performed by: PHYSICIAN ASSISTANT

## 2024-01-11 PROCEDURE — 80053 COMPREHEN METABOLIC PANEL: CPT | Performed by: PHYSICIAN ASSISTANT

## 2024-01-11 PROCEDURE — 36415 COLL VENOUS BLD VENIPUNCTURE: CPT | Performed by: PHYSICIAN ASSISTANT

## 2024-01-11 PROCEDURE — 85027 COMPLETE CBC AUTOMATED: CPT | Performed by: PHYSICIAN ASSISTANT

## 2024-01-11 RX ORDER — HYDROXYZINE HYDROCHLORIDE 10 MG/1
10 TABLET, FILM COATED ORAL SEE ADMIN INSTRUCTIONS
Qty: 20 TABLET | Refills: 1 | Status: SHIPPED | OUTPATIENT
Start: 2024-01-11

## 2024-01-11 ASSESSMENT — ENCOUNTER SYMPTOMS
NAUSEA: 1
ABDOMINAL PAIN: 0
NERVOUS/ANXIOUS: 1
DIAPHORESIS: 1
VOMITING: 0

## 2024-01-11 ASSESSMENT — ANXIETY QUESTIONNAIRES
3. WORRYING TOO MUCH ABOUT DIFFERENT THINGS: NOT AT ALL
6. BECOMING EASILY ANNOYED OR IRRITABLE: NOT AT ALL
8. IF YOU CHECKED OFF ANY PROBLEMS, HOW DIFFICULT HAVE THESE MADE IT FOR YOU TO DO YOUR WORK, TAKE CARE OF THINGS AT HOME, OR GET ALONG WITH OTHER PEOPLE?: NOT DIFFICULT AT ALL
GAD7 TOTAL SCORE: 0
IF YOU CHECKED OFF ANY PROBLEMS ON THIS QUESTIONNAIRE, HOW DIFFICULT HAVE THESE PROBLEMS MADE IT FOR YOU TO DO YOUR WORK, TAKE CARE OF THINGS AT HOME, OR GET ALONG WITH OTHER PEOPLE: NOT DIFFICULT AT ALL
7. FEELING AFRAID AS IF SOMETHING AWFUL MIGHT HAPPEN: NOT AT ALL
GAD7 TOTAL SCORE: 0
GAD7 TOTAL SCORE: 0
2. NOT BEING ABLE TO STOP OR CONTROL WORRYING: NOT AT ALL
7. FEELING AFRAID AS IF SOMETHING AWFUL MIGHT HAPPEN: NOT AT ALL
5. BEING SO RESTLESS THAT IT IS HARD TO SIT STILL: NOT AT ALL
1. FEELING NERVOUS, ANXIOUS, OR ON EDGE: NOT AT ALL
4. TROUBLE RELAXING: NOT AT ALL

## 2024-01-11 ASSESSMENT — PAIN SCALES - GENERAL: PAINLEVEL: NO PAIN (1)
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
## 2024-01-12 LAB
ALBUMIN SERPL BCG-MCNC: 4.8 G/DL (ref 3.5–5.2)
ALP SERPL-CCNC: 62 U/L (ref 40–150)
ALT SERPL W P-5'-P-CCNC: 27 U/L (ref 0–50)
ANION GAP SERPL CALCULATED.3IONS-SCNC: 15 MMOL/L (ref 7–15)
AST SERPL W P-5'-P-CCNC: 23 U/L (ref 0–45)
BILIRUB SERPL-MCNC: 0.5 MG/DL
BUN SERPL-MCNC: 17.6 MG/DL (ref 6–20)
CALCIUM SERPL-MCNC: 9.5 MG/DL (ref 8.6–10)
CHLORIDE SERPL-SCNC: 103 MMOL/L (ref 98–107)
CREAT SERPL-MCNC: 0.69 MG/DL (ref 0.51–0.95)
DEPRECATED HCO3 PLAS-SCNC: 21 MMOL/L (ref 22–29)
EGFRCR SERPLBLD CKD-EPI 2021: >90 ML/MIN/1.73M2
GLUCOSE SERPL-MCNC: 95 MG/DL (ref 70–99)
POTASSIUM SERPL-SCNC: 4.5 MMOL/L (ref 3.4–5.3)
PROT SERPL-MCNC: 7.4 G/DL (ref 6.4–8.3)
SODIUM SERPL-SCNC: 139 MMOL/L (ref 135–145)
TSH SERPL DL<=0.005 MIU/L-ACNC: 0.35 UIU/ML (ref 0.3–4.2)

## 2024-01-15 ENCOUNTER — MYC MEDICAL ADVICE (OUTPATIENT)
Dept: FAMILY MEDICINE | Facility: CLINIC | Age: 44
End: 2024-01-15
Payer: COMMERCIAL

## 2024-01-15 DIAGNOSIS — E03.9 ACQUIRED HYPOTHYROIDISM: Primary | ICD-10-CM

## 2024-01-15 RX ORDER — LEVOTHYROXINE SODIUM 150 UG/1
150 TABLET ORAL DAILY
Qty: 90 TABLET | Refills: 0 | Status: SHIPPED | OUTPATIENT
Start: 2024-01-15

## 2024-01-15 NOTE — TELEPHONE ENCOUNTER
Patient completed labs on 1/11/23. Patient responding to provider's comments. Routing to provider to review and advise.     Kelsey Regan, CHARANJITN, RN   [facility]

## 2024-01-25 ENCOUNTER — MYC MEDICAL ADVICE (OUTPATIENT)
Dept: FAMILY MEDICINE | Facility: CLINIC | Age: 44
End: 2024-01-25
Payer: COMMERCIAL

## 2024-01-25 DIAGNOSIS — F40.243 FEAR OF FLYING: Primary | ICD-10-CM

## 2024-01-25 RX ORDER — ALPRAZOLAM 0.25 MG
TABLET ORAL
Qty: 5 TABLET | Refills: 0 | Status: SHIPPED | OUTPATIENT
Start: 2024-01-25

## 2024-01-25 NOTE — TELEPHONE ENCOUNTER
Saw PCP 2 weeks ago and she has been trialing hydroxyzine for her fear of flying and is giving update.  Office notes state:    Fear of flying  Will try Hydroxizine at night to see if it helps the next day. If not effective, try taking day of flight. If not helpful, patient can send a MyChart message and we can try a low dose, short acting benzodiazepine. Consider CBT/DBT.  - hydrOXYzine HCl (ATARAX) 10 MG tablet; Take 1 tablet (10 mg) by mouth See Admin Instructions        Pharmacy pended.   Routing to provider to advise.  Chasity Roach BSN, RN

## 2024-02-04 ENCOUNTER — HEALTH MAINTENANCE LETTER (OUTPATIENT)
Age: 44
End: 2024-02-04

## 2024-04-03 ENCOUNTER — E-VISIT (OUTPATIENT)
Dept: FAMILY MEDICINE | Facility: CLINIC | Age: 44
End: 2024-04-03
Payer: COMMERCIAL

## 2024-04-03 DIAGNOSIS — R21 RASH: Primary | ICD-10-CM

## 2024-04-03 PROCEDURE — 99423 OL DIG E/M SVC 21+ MIN: CPT | Performed by: PHYSICIAN ASSISTANT

## 2024-04-04 RX ORDER — METHYLPREDNISOLONE 4 MG
TABLET, DOSE PACK ORAL
Qty: 21 TABLET | Refills: 0 | Status: SHIPPED | OUTPATIENT
Start: 2024-04-04

## 2024-04-04 NOTE — TELEPHONE ENCOUNTER
Provider E-Visit time total (minutes): 21    Since rash has been present in the past, is on multiple body areas and I only know that patient has failed Benadryl, I am going to give her a quick course of steroids to see if this relieves the issue. Photo viewed. Medications, allergies and medical history reviewed. If not effective, patient should come into clinic for an in person visit.

## 2024-04-16 ENCOUNTER — OFFICE VISIT (OUTPATIENT)
Dept: FAMILY MEDICINE | Facility: CLINIC | Age: 44
End: 2024-04-16
Payer: COMMERCIAL

## 2024-04-16 ENCOUNTER — ANCILLARY PROCEDURE (OUTPATIENT)
Dept: GENERAL RADIOLOGY | Facility: CLINIC | Age: 44
End: 2024-04-16
Attending: PHYSICIAN ASSISTANT
Payer: COMMERCIAL

## 2024-04-16 VITALS
RESPIRATION RATE: 16 BRPM | OXYGEN SATURATION: 100 % | BODY MASS INDEX: 24.75 KG/M2 | DIASTOLIC BLOOD PRESSURE: 78 MMHG | SYSTOLIC BLOOD PRESSURE: 121 MMHG | HEART RATE: 78 BPM | WEIGHT: 145 LBS | HEIGHT: 64 IN | TEMPERATURE: 97.4 F

## 2024-04-16 DIAGNOSIS — M25.50 MULTIPLE JOINT PAIN: ICD-10-CM

## 2024-04-16 DIAGNOSIS — E03.9 ACQUIRED HYPOTHYROIDISM: ICD-10-CM

## 2024-04-16 DIAGNOSIS — M25.50 MULTIPLE JOINT PAIN: Primary | ICD-10-CM

## 2024-04-16 LAB — ERYTHROCYTE [SEDIMENTATION RATE] IN BLOOD BY WESTERGREN METHOD: 5 MM/HR (ref 0–20)

## 2024-04-16 PROCEDURE — 86140 C-REACTIVE PROTEIN: CPT | Performed by: PHYSICIAN ASSISTANT

## 2024-04-16 PROCEDURE — 86039 ANTINUCLEAR ANTIBODIES (ANA): CPT | Performed by: PHYSICIAN ASSISTANT

## 2024-04-16 PROCEDURE — 84550 ASSAY OF BLOOD/URIC ACID: CPT | Performed by: PHYSICIAN ASSISTANT

## 2024-04-16 PROCEDURE — 85652 RBC SED RATE AUTOMATED: CPT | Performed by: PHYSICIAN ASSISTANT

## 2024-04-16 PROCEDURE — 84439 ASSAY OF FREE THYROXINE: CPT | Performed by: PHYSICIAN ASSISTANT

## 2024-04-16 PROCEDURE — 84443 ASSAY THYROID STIM HORMONE: CPT | Performed by: PHYSICIAN ASSISTANT

## 2024-04-16 PROCEDURE — 72100 X-RAY EXAM L-S SPINE 2/3 VWS: CPT | Mod: TC | Performed by: RADIOLOGY

## 2024-04-16 PROCEDURE — 86235 NUCLEAR ANTIGEN ANTIBODY: CPT | Performed by: PHYSICIAN ASSISTANT

## 2024-04-16 PROCEDURE — 36415 COLL VENOUS BLD VENIPUNCTURE: CPT | Performed by: PHYSICIAN ASSISTANT

## 2024-04-16 PROCEDURE — 86038 ANTINUCLEAR ANTIBODIES: CPT | Performed by: PHYSICIAN ASSISTANT

## 2024-04-16 PROCEDURE — 99214 OFFICE O/P EST MOD 30 MIN: CPT | Performed by: PHYSICIAN ASSISTANT

## 2024-04-16 PROCEDURE — 73130 X-RAY EXAM OF HAND: CPT | Mod: TC | Performed by: RADIOLOGY

## 2024-04-16 ASSESSMENT — PAIN SCALES - GENERAL: PAINLEVEL: SEVERE PAIN (6)

## 2024-04-16 NOTE — PROGRESS NOTES
Assessment & Plan     Multiple joint pain  Will get some updated labs and x-rays. Recommended taking the medrol dospak she was given for a rash she incurred while in [place] (that she did not take because the rash resolved before she was able to take it). Consider Ortho evaluation vs OT for hands/PT for back.  - XR Lumbar Spine 2/3 Views  - Uric acid  - CRP, inflammation  - ESR: Erythrocyte sedimentation rate  - Anti Nuclear Priyanka IgG by IFA with Reflex    Acquired hypothyroidism  Recheck TSH for thyroid. She had a medication change at last draw.   - TSH with free T4 reflex    Subjective   Latonia is a 43 year old, presenting for the following health issues:  Arthritis (Sx in fingers and tingling in the toes)      4/16/2024     9:21 AM   Additional Questions   Roomed by Kaylah   Accompanied by self         4/16/2024     9:21 AM   Patient Reported Additional Medications   Patient reports taking the following new medications N/a     Latonia is a very pleasant 43 year old female who presents to clinic for evaluation of multiple joint pains and numbness in two tows on her left foot. She states she had a trigger finger and saw [facility] for a joint injection in the left pinky finger. It helped with the catching, but the pain has returned in the past couple weeks. In her left pinky and ring fingers, all the joints are painful and swollen. She is struggling with getting her wedding ring off. She also mentions that she has bilateral knee pain. She has bone spurs on both heels, right hip. And her low back hurts. She has celiac disease and watches her diet very carefully. She has been tested for rheumatoid arthritis and was negative for RF. CRP and ESR have been negative in the past. HLA-B27 also negative. Her dad has a hx of gout. She also had an issue with sciatica last year. She had a sharp nerve pain behind the right knee/lower thigh. Sometimes she will get a jolt when she rotates at the waist in the left rib cage. Sometimes the  "pain is so sudden and sharp that it \"knocks her off her feet.\" And the left 3rd and 4th toes are numb intermittently when walking.     History of Present Illness       Reason for visit:  Joint pain, nerve pain  Symptom onset:  More than a month  Symptoms include:  Nerve pain (left rib, right thigh) sore joibts  Symptom intensity:  Moderate  Symptom progression:  Staying the same  Had these symptoms before:  No    She eats 2-3 servings of fruits and vegetables daily.She consumes 0 sweetened beverage(s) daily.She exercises with enough effort to increase her heart rate 10 to 19 minutes per day.  She exercises with enough effort to increase her heart rate 6 days per week. She is missing 1 dose(s) of medications per week.  She is not taking prescribed medications regularly due to remembering to take.       ROS: no fevers, chills. No recent illnesses. No swelling in the knees. Positive swelling of the hands.       Objective    /78   Pulse 78   Temp 97.4  F (36.3  C) (Tympanic)   Resp 16   Ht 1.626 m (5' 4\")   Wt 65.8 kg (145 lb)   LMP 11/27/2021   SpO2 100%   BMI 24.89 kg/m    Body mass index is 24.89 kg/m .  Physical Exam  Vitals reviewed.   Constitutional:       General: She is not in acute distress.     Appearance: Normal appearance. She is not ill-appearing.   Pulmonary:      Effort: Pulmonary effort is normal.   Musculoskeletal:      Comments: Left hand is swollen and warmer than the right. She has TTP of pinkys and ring fingers of both hands. She has what appears to be a bone spur/growth on the posterior heel of both feet. She has TTP of the lumbar spine at the level of her pant waist. Paraspinous muscle palpation normal and movement normal. She can feel touch in her left foot/toes when sitting.    Neurological:      Mental Status: She is alert.       Signed Electronically by: KYLAH FUENTES PA-C    "

## 2024-04-17 DIAGNOSIS — R76.8 POSITIVE ANA (ANTINUCLEAR ANTIBODY): Primary | ICD-10-CM

## 2024-04-17 LAB
ANA PAT SER IF-IMP: ABNORMAL
ANA SER QL IF: POSITIVE
ANA TITR SER IF: ABNORMAL {TITER}
CRP SERPL-MCNC: <3 MG/L
T4 FREE SERPL-MCNC: 2.48 NG/DL (ref 0.9–1.7)
TSH SERPL DL<=0.005 MIU/L-ACNC: 0.15 UIU/ML (ref 0.3–4.2)
URATE SERPL-MCNC: 5 MG/DL (ref 2.4–5.7)

## 2024-04-19 LAB
ENA SM IGG SER IA-ACNC: 1 U/ML
ENA SM IGG SER IA-ACNC: NEGATIVE
ENA SS-A AB SER IA-ACNC: 0.5 U/ML
ENA SS-A AB SER IA-ACNC: NEGATIVE
ENA SS-B IGG SER IA-ACNC: <0.6 U/ML
ENA SS-B IGG SER IA-ACNC: NEGATIVE
U1 SNRNP IGG SER IA-ACNC: 1.5 U/ML
U1 SNRNP IGG SER IA-ACNC: NEGATIVE

## 2024-04-23 DIAGNOSIS — M54.50 LOW BACK PAIN, UNSPECIFIED BACK PAIN LATERALITY, UNSPECIFIED CHRONICITY, UNSPECIFIED WHETHER SCIATICA PRESENT: ICD-10-CM

## 2024-04-23 DIAGNOSIS — E03.9 ACQUIRED HYPOTHYROIDISM: Primary | ICD-10-CM

## 2024-04-23 RX ORDER — LEVOTHYROXINE SODIUM 125 UG/1
125 TABLET ORAL DAILY
Qty: 90 TABLET | Refills: 0 | Status: SHIPPED | OUTPATIENT
Start: 2024-04-23

## 2024-05-28 ENCOUNTER — PATIENT OUTREACH (OUTPATIENT)
Dept: CARE COORDINATION | Facility: CLINIC | Age: 44
End: 2024-05-28
Payer: COMMERCIAL

## 2024-06-10 ENCOUNTER — OFFICE VISIT (OUTPATIENT)
Dept: FAMILY MEDICINE | Facility: CLINIC | Age: 44
End: 2024-06-10
Payer: COMMERCIAL

## 2024-06-10 VITALS
HEART RATE: 73 BPM | BODY MASS INDEX: 24.75 KG/M2 | TEMPERATURE: 98 F | OXYGEN SATURATION: 99 % | HEIGHT: 64 IN | WEIGHT: 145 LBS | RESPIRATION RATE: 16 BRPM | SYSTOLIC BLOOD PRESSURE: 125 MMHG | DIASTOLIC BLOOD PRESSURE: 84 MMHG

## 2024-06-10 DIAGNOSIS — N64.4 BREAST PAIN, RIGHT: Primary | ICD-10-CM

## 2024-06-10 PROCEDURE — 99213 OFFICE O/P EST LOW 20 MIN: CPT | Performed by: PHYSICIAN ASSISTANT

## 2024-06-10 NOTE — PROGRESS NOTES
"  Assessment & Plan     Breast pain, right  Patient just had her annual mammogram and it was normal but she does have noted fibroglandular tissue. Will order diagnostic mammo and that can be cancelled by radiology staff if not needed. Also orders right breast targeted US for evaluation of the area.   - MA Diagnostic Digital Bilateral; Future  - US Breast Right Limited 1-3 Quadrants; Future      Subjective   Latonia is a 43 year old, presenting for the following health issues:  Mass      6/10/2024     9:43 AM   Additional Questions   Roomed by Kaylah   Accompanied by self         6/10/2024     9:43 AM   Patient Reported Additional Medications   Patient reports taking the following new medications n/a     Latonia is a very pleasant 43 year old female who presents to clinic for evaluation of a painful lump in her right breast found approximately 3 weeks ago when putting her seatbelt on in the car. She felt the lump at that time but has not felt it recently. Pain still present to palpation. She had a screening mammo in January that was normal with fibroglandular tissue. No other concerns for today.      History of Present Illness       Reason for visit:  Sore spot on breast  Symptom onset:  1-2 weeks ago    She eats 0-1 servings of fruits and vegetables daily.She consumes 0 sweetened beverage(s) daily.She exercises with enough effort to increase her heart rate 20 to 29 minutes per day.  She exercises with enough effort to increase her heart rate 7 days per week. She is missing 1 dose(s) of medications per week.        Review of Systems  No discharge, erythema, change in surrounding tissue. Had a palpable lump but cannot feel it now.         Objective    /84   Pulse 73   Temp 98  F (36.7  C) (Tympanic)   Resp 16   Ht 1.626 m (5' 4\")   Wt 65.8 kg (145 lb)   LMP 11/27/2021   SpO2 99%   BMI 24.89 kg/m    Body mass index is 24.89 kg/m .  Physical Exam  Vitals reviewed.   Constitutional:       Appearance: Normal " appearance. She is not ill-appearing.   Chest:          Comments: Area of pain. No palpable lump. No discharge from the nipple.   Neurological:      Mental Status: She is alert and oriented to person, place, and time.   Psychiatric:         Mood and Affect: Mood normal.         Speech: Speech normal.         Behavior: Behavior normal. Behavior is cooperative.         Thought Content: Thought content normal.         Cognition and Memory: Cognition normal.         Judgment: Judgment normal.          Signed Electronically by: KYLAH FUENTES PA-C

## 2024-10-04 DIAGNOSIS — E03.9 ACQUIRED HYPOTHYROIDISM: ICD-10-CM

## 2024-10-04 DIAGNOSIS — E03.9 ACQUIRED HYPOTHYROIDISM: Primary | ICD-10-CM

## 2024-10-04 NOTE — TELEPHONE ENCOUNTER
Needs TSH drawn prior to refills unless completely out and then I will refill for 30 days to allow time to get to clinic. Please let me know.

## 2024-10-04 NOTE — TELEPHONE ENCOUNTER
Patient did set up a lab appointment on 10/12/24.Left message on patient's voicemail to call us back to find out if she is out of medication.Valeria Costa [facility]

## 2024-10-06 ENCOUNTER — LAB (OUTPATIENT)
Dept: LAB | Facility: CLINIC | Age: 44
End: 2024-10-06
Payer: COMMERCIAL

## 2024-10-06 DIAGNOSIS — E03.9 ACQUIRED HYPOTHYROIDISM: ICD-10-CM

## 2024-10-06 LAB
T4 FREE SERPL-MCNC: 1.15 NG/DL (ref 0.9–1.7)
TSH SERPL DL<=0.005 MIU/L-ACNC: 11.1 UIU/ML (ref 0.3–4.2)

## 2024-10-06 PROCEDURE — 84443 ASSAY THYROID STIM HORMONE: CPT

## 2024-10-06 PROCEDURE — 84439 ASSAY OF FREE THYROXINE: CPT

## 2024-10-06 PROCEDURE — 36415 COLL VENOUS BLD VENIPUNCTURE: CPT

## 2024-10-07 ENCOUNTER — MYC MEDICAL ADVICE (OUTPATIENT)
Dept: FAMILY MEDICINE | Facility: CLINIC | Age: 44
End: 2024-10-07
Payer: COMMERCIAL

## 2024-10-07 DIAGNOSIS — E03.9 ACQUIRED HYPOTHYROIDISM: Primary | ICD-10-CM

## 2024-10-07 RX ORDER — LEVOTHYROXINE SODIUM 125 UG/1
125 TABLET ORAL DAILY
Qty: 90 TABLET | Refills: 0 | OUTPATIENT
Start: 2024-10-07

## 2024-10-07 RX ORDER — LEVOTHYROXINE SODIUM 150 UG/1
TABLET ORAL
Qty: 90 TABLET | Refills: 0 | Status: SHIPPED | OUTPATIENT
Start: 2024-10-07

## 2024-12-11 ENCOUNTER — DOCUMENTATION ONLY (OUTPATIENT)
Dept: LAB | Facility: CLINIC | Age: 44
End: 2024-12-11
Payer: COMMERCIAL

## 2024-12-11 DIAGNOSIS — E03.9 ACQUIRED HYPOTHYROIDISM: Primary | ICD-10-CM

## 2024-12-12 NOTE — PROGRESS NOTES
"Kimberley Choe has an upcoming lab appointment:    Future Appointments   Date Time Provider Department Center   12/12/2024 11:30 AM [facility]   1/9/2025  9:00 AM Veronique Brown [facility]     Patient is scheduled for the following lab(s): Appointment notes state \"Thryoid recheck\".    There is no order available. Please review and place either future orders or HMPO (Review of Health Maintenance Protocol Orders), as appropriate.    Health Maintenance Due   Topic    ANNUAL REVIEW OF HM ORDERS     HEPATIC PANEL      Katt Zeng   "

## 2024-12-14 ENCOUNTER — APPOINTMENT (OUTPATIENT)
Dept: GENERAL RADIOLOGY | Facility: CLINIC | Age: 44
End: 2024-12-14
Attending: FAMILY MEDICINE
Payer: COMMERCIAL

## 2024-12-14 ENCOUNTER — HOSPITAL ENCOUNTER (EMERGENCY)
Facility: CLINIC | Age: 44
Discharge: HOME | End: 2024-12-14
Attending: FAMILY MEDICINE | Admitting: FAMILY MEDICINE
Payer: COMMERCIAL

## 2024-12-14 VITALS
DIASTOLIC BLOOD PRESSURE: 74 MMHG | SYSTOLIC BLOOD PRESSURE: 128 MMHG | HEIGHT: 64 IN | HEART RATE: 82 BPM | TEMPERATURE: 98.3 F | WEIGHT: 145 LBS | RESPIRATION RATE: 17 BRPM | BODY MASS INDEX: 24.75 KG/M2 | OXYGEN SATURATION: 98 %

## 2024-12-14 DIAGNOSIS — R07.89 NON-CARDIAC CHEST PAIN: ICD-10-CM

## 2024-12-14 LAB
ALBUMIN SERPL BCG-MCNC: 4.3 G/DL (ref 3.5–5.2)
ALP SERPL-CCNC: 56 U/L (ref 40–150)
ALT SERPL W P-5'-P-CCNC: 28 U/L (ref 0–50)
ANION GAP SERPL CALCULATED.3IONS-SCNC: 11 MMOL/L (ref 7–15)
AST SERPL W P-5'-P-CCNC: 19 U/L (ref 0–45)
ATRIAL RATE - MUSE: 89 BPM
BASOPHILS # BLD AUTO: 0 10E3/UL (ref 0–0.2)
BASOPHILS NFR BLD AUTO: 1 %
BILIRUB DIRECT SERPL-MCNC: <0.2 MG/DL (ref 0–0.3)
BILIRUB SERPL-MCNC: 0.6 MG/DL
BUN SERPL-MCNC: 13.1 MG/DL (ref 6–20)
CALCIUM SERPL-MCNC: 8.4 MG/DL (ref 8.8–10.4)
CHLORIDE SERPL-SCNC: 104 MMOL/L (ref 98–107)
CREAT SERPL-MCNC: 0.79 MG/DL (ref 0.51–0.95)
D DIMER PPP FEU-MCNC: 0.33 UG/ML FEU (ref 0–0.5)
DIASTOLIC BLOOD PRESSURE - MUSE: NORMAL MMHG
EGFRCR SERPLBLD CKD-EPI 2021: >90 ML/MIN/1.73M2
EOSINOPHIL # BLD AUTO: 0.1 10E3/UL (ref 0–0.7)
EOSINOPHIL NFR BLD AUTO: 2 %
ERYTHROCYTE [DISTWIDTH] IN BLOOD BY AUTOMATED COUNT: 12.2 % (ref 10–15)
GLUCOSE SERPL-MCNC: 108 MG/DL (ref 70–99)
HCO3 SERPL-SCNC: 26 MMOL/L (ref 22–29)
HCT VFR BLD AUTO: 42.1 % (ref 35–47)
HGB BLD-MCNC: 14.6 G/DL (ref 11.7–15.7)
HOLD SPECIMEN: NORMAL
HOLD SPECIMEN: NORMAL
IMM GRANULOCYTES # BLD: 0 10E3/UL
IMM GRANULOCYTES NFR BLD: 0 %
INTERPRETATION ECG - MUSE: NORMAL
LIPASE SERPL-CCNC: 40 U/L (ref 13–60)
LYMPHOCYTES # BLD AUTO: 1.3 10E3/UL (ref 0.8–5.3)
LYMPHOCYTES NFR BLD AUTO: 25 %
MCH RBC QN AUTO: 29.3 PG (ref 26.5–33)
MCHC RBC AUTO-ENTMCNC: 34.7 G/DL (ref 31.5–36.5)
MCV RBC AUTO: 85 FL (ref 78–100)
MONOCYTES # BLD AUTO: 0.8 10E3/UL (ref 0–1.3)
MONOCYTES NFR BLD AUTO: 15 %
NEUTROPHILS # BLD AUTO: 2.9 10E3/UL (ref 1.6–8.3)
NEUTROPHILS NFR BLD AUTO: 58 %
NRBC # BLD AUTO: 0 10E3/UL
NRBC BLD AUTO-RTO: 0 /100
P AXIS - MUSE: 41 DEGREES
PLATELET # BLD AUTO: 226 10E3/UL (ref 150–450)
POTASSIUM SERPL-SCNC: 4.3 MMOL/L (ref 3.4–5.3)
PR INTERVAL - MUSE: 156 MS
PROT SERPL-MCNC: 6.8 G/DL (ref 6.4–8.3)
QRS DURATION - MUSE: 82 MS
QT - MUSE: 372 MS
QTC - MUSE: 452 MS
R AXIS - MUSE: 18 DEGREES
RBC # BLD AUTO: 4.98 10E6/UL (ref 3.8–5.2)
SODIUM SERPL-SCNC: 141 MMOL/L (ref 135–145)
SYSTOLIC BLOOD PRESSURE - MUSE: NORMAL MMHG
T AXIS - MUSE: 34 DEGREES
TROPONIN T SERPL HS-MCNC: <6 NG/L
TSH SERPL DL<=0.005 MIU/L-ACNC: 0.78 UIU/ML (ref 0.3–4.2)
VENTRICULAR RATE- MUSE: 89 BPM
WBC # BLD AUTO: 5.1 10E3/UL (ref 4–11)

## 2024-12-14 PROCEDURE — 99285 EMERGENCY DEPT VISIT HI MDM: CPT | Mod: 25 | Performed by: FAMILY MEDICINE

## 2024-12-14 PROCEDURE — 93005 ELECTROCARDIOGRAM TRACING: CPT | Performed by: FAMILY MEDICINE

## 2024-12-14 PROCEDURE — 84484 ASSAY OF TROPONIN QUANT: CPT | Performed by: FAMILY MEDICINE

## 2024-12-14 PROCEDURE — 99284 EMERGENCY DEPT VISIT MOD MDM: CPT | Performed by: FAMILY MEDICINE

## 2024-12-14 PROCEDURE — 85004 AUTOMATED DIFF WBC COUNT: CPT | Performed by: FAMILY MEDICINE

## 2024-12-14 PROCEDURE — 85025 COMPLETE CBC W/AUTO DIFF WBC: CPT | Performed by: FAMILY MEDICINE

## 2024-12-14 PROCEDURE — 36415 COLL VENOUS BLD VENIPUNCTURE: CPT | Performed by: FAMILY MEDICINE

## 2024-12-14 PROCEDURE — 85379 FIBRIN DEGRADATION QUANT: CPT | Performed by: FAMILY MEDICINE

## 2024-12-14 PROCEDURE — 83690 ASSAY OF LIPASE: CPT | Performed by: FAMILY MEDICINE

## 2024-12-14 PROCEDURE — 80053 COMPREHEN METABOLIC PANEL: CPT | Performed by: FAMILY MEDICINE

## 2024-12-14 PROCEDURE — 84443 ASSAY THYROID STIM HORMONE: CPT | Performed by: FAMILY MEDICINE

## 2024-12-14 PROCEDURE — 84155 ASSAY OF PROTEIN SERUM: CPT | Performed by: FAMILY MEDICINE

## 2024-12-14 PROCEDURE — 93010 ELECTROCARDIOGRAM REPORT: CPT | Performed by: FAMILY MEDICINE

## 2024-12-14 PROCEDURE — 71046 X-RAY EXAM CHEST 2 VIEWS: CPT

## 2024-12-14 PROCEDURE — 80048 BASIC METABOLIC PNL TOTAL CA: CPT | Performed by: FAMILY MEDICINE

## 2024-12-14 ASSESSMENT — COLUMBIA-SUICIDE SEVERITY RATING SCALE - C-SSRS
2. HAVE YOU ACTUALLY HAD ANY THOUGHTS OF KILLING YOURSELF IN THE PAST MONTH?: NO
1. IN THE PAST MONTH, HAVE YOU WISHED YOU WERE DEAD OR WISHED YOU COULD GO TO SLEEP AND NOT WAKE UP?: NO
6. HAVE YOU EVER DONE ANYTHING, STARTED TO DO ANYTHING, OR PREPARED TO DO ANYTHING TO END YOUR LIFE?: NO

## 2024-12-14 ASSESSMENT — ENCOUNTER SYMPTOMS
VOMITING: 0
FEVER: 0
HEADACHES: 0
COUGH: 0
ABDOMINAL PAIN: 0
SORE THROAT: 0
PALPITATIONS: 0
DIAPHORESIS: 0
CONSTIPATION: 0
BLOOD IN STOOL: 0
CHILLS: 0
NAUSEA: 0
SHORTNESS OF BREATH: 0
SINUS PRESSURE: 0
WHEEZING: 0
DYSURIA: 0
FREQUENCY: 0
DIARRHEA: 0

## 2024-12-14 ASSESSMENT — ACTIVITIES OF DAILY LIVING (ADL)
ADLS_ACUITY_SCORE: 41

## 2024-12-14 NOTE — ED TRIAGE NOTES
Patient having chest pain/palpitations for past 2-3 days. States it is intermittent stabbing when it does happen and then goes away. Worse with deep breathing. Also some dull ache in left shoulder. 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     Triage Assessment (Adult)       Row Name 12/14/24 0956          Triage Assessment    Airway WDL WDL        Respiratory WDL    Respiratory WDL WDL        Skin Circulation/Temperature WDL    Skin Circulation/Temperature WDL WDL        Cardiac WDL    Cardiac WDL chest pain        Chest Pain Assessment    Chest Pain Location shoulder, left     Duration intermittent, stabbing     Precipitating Factors other (see comments)  with deep breaths

## 2024-12-14 NOTE — DISCHARGE INSTRUCTIONS
ICD-10-CM    1. Non-cardiac chest pain  R07.89     reassuring eval.  follow-up clinic 1-2 weeks. suspect GERD given hiatal hernia.  take 64 oz fluid per day avoiding caffeine, no food 2 hours before bed. Prilosec 20 mg orally daily for 4 weeks.  also chest wall pain amd trapezius spasm may be causing some syumptoms. consider Debra,. youberry, trapezius spasm.

## 2024-12-14 NOTE — ED PROVIDER NOTES
History     Chief Complaint   Patient presents with    Chest Pain     HPI  Kimberley Choe is a 44 year old female who presents with history of thyrotoxicosis anemia celiac disease, iron deficiency anemia history in the past.  She presents with anterior chest pain that she noted it started about 2 days ago.  She had been working in the barn which houses horses and she does activity there all the time each day but had no significant overuse that she was aware of she does travel by car and by plane frequently.  In October they travel twice to [place] which is a total of 14-hour drive.  They flew to [place] which is at least 3-hour flight.  No history of VTE.  No estrogen use.  She is status post hysterectomy for menorrhagia.  She has no systemic symptoms currently.  Minimal to no shortness of breath nausea vomiting sweats.  Nonexertional pain.  Not worse with deep breathing.  Not associated with food intake.  No associated change in bowel habits.  No dysuria urgency frequency hematuria.    Denies  history or FHx of venous thromboembolism, recent surgery (last 4 weeks), active cancer history, hypercoagulable state, estrogen or other medications/conditions causing VTE or  new unilateral swelling or pain in the legs or calves.      Allergies:  Allergies   Allergen Reactions    Gluten Other (See Comments)     Celiac disease    Macrobid [Nitrofuran Derivatives] Nausea and Vomiting    Zithromax [Azithromycin Dihydrate] Nausea and Vomiting       Problem List:    Patient Active Problem List    Diagnosis Date Noted    Endometriosis determined by laparoscopy 10/05/2021     Priority: Medium     S/p LAPAROSCOPIC ASSISTED VAGINAL HYSTERECTOMY, bilat salpingectomy, excision of endometriosis 12/21      Pelvic pain in female 06/03/2021     Priority: Medium    Iron deficiency anemia, unspecified iron deficiency anemia type 12/13/2017     Priority: Medium    Fatty liver disease, nonalcoholic 10/03/2013     Priority: Medium    Celiac  Patient has no IV access. Several attempts made to insert IV x3 staff nurses, attempts unsuccessful. Primary MD paged for possible medline insertion per Jg PAKRS charge nurse. Currently awaiting MD response.   disease 11/10/2010     Priority: Medium    CARDIOVASCULAR SCREENING; LDL GOAL LESS THAN 160 10/31/2010     Priority: Medium    Anemia 06/17/2010     Priority: Medium     Better after diagnosis of celiac disease      FHx: congenital heart disease 08/29/2009     Priority: Medium     Overview:   evaluated  with  stress and  echo  2007    Formatting of this note might be different from the original.  evaluated  with  stress and  echo  2007      Vitamin D deficiency 06/23/2009     Priority: Medium    Undiagnosed cardiac murmurs 03/07/2008     Priority: Medium     Echo ok.  No prophylactic antibiotics required      Hypothyroid 02/22/2007     Priority: Medium     Overview:   with multinodular goiter    Formatting of this note might be different from the original.  after hashimotos and thyroidectomy 10/02 dr angelica arce      Thyrotoxicosis of other specified origin 12/20/2005     Priority: Medium     S/p thyroidectomy 2002    Formatting of this note might be different from the original.  with multinodular goiter          Past Medical History:    Past Medical History:   Diagnosis Date    Anemia, unspecified     Arthritis 2006    Celiac disease     Dysmenorrhea 2/1/2012    GERD (gastroesophageal reflux disease) 1/30/2014    Papanicolaou smear of cervix with low grade squamous intraepithelial lesion (LGSIL) 7/09    Right hip labral tear 8/24/2006    Unspecified closed fracture of ankle     Unspecified disorder of thyroid 10/20/2002       Past Surgical History:    Past Surgical History:   Procedure Laterality Date    BIOPSY  2014    Liver    ENT SURGERY  2000    Thyroidectomy    ESOPHAGOSCOPY, GASTROSCOPY, DUODENOSCOPY (EGD), COMBINED  11/01/2010    EGD    LAPAROSCOPIC ABLATION ENDOMETRIOSIS  10/05/2021    Procedure: cautery of endometriosis;  Surgeon: Tasha Dewey MD;  Location: [facility]    LAPAROSCOPIC ASSISTED HYSTERECTOMY VAGINAL N/A 12/02/2021    LAVH-bilat salpingectomy, removal of endometriosis    LAPAROSCOPY  DIAGNOSTIC (GYN) N/A 10/05/2021    Procedure: Diagnostic laparoscopy;  Surgeon: Tasha Dewey MD;  Location: [facility] OR    THYROIDECTOMY   2002    S/p thyroidectomy 2002    ZZC HIP ARTHROSCOPY, DX  06/2008    (R) Anterior superior labral debridement.        Family History:    Family History   Problem Relation Age of Onset    Gastrointestinal Disease Mother         celiac    Liver Disease Mother         PVC    Gallbladder Disease Mother     Hypertension Father     Arthritis Father     Heart Disease Father         cardimyopthy age 60, arrythmias    Lipids Father     Neurologic Disorder Sister         brain tumor    Gallbladder Disease Sister     Diabetes Maternal Grandmother     Alcohol/Drug Maternal Grandmother     Gastrointestinal Disease Paternal Grandmother         IBS    Hypertension Paternal Grandmother     Cerebrovascular Disease Paternal Grandmother     Alzheimer Disease Paternal Grandmother     C.A.D. No family hx of     Breast Cancer No family hx of     Cancer - colorectal No family hx of     Anesthesia Reaction No family hx of     Bleeding Disorder No family hx of        Social History:  Marital Status:   [2]  Social History     Tobacco Use    Smoking status: Never    Smokeless tobacco: Never   Vaping Use    Vaping status: Never Used   Substance Use Topics    Alcohol use: Yes     Comment: rare    Drug use: No        Medications:    ALPRAZolam (XANAX) 0.25 MG tablet  hydrOXYzine HCl (ATARAX) 10 MG tablet  levothyroxine (SYNTHROID/LEVOTHROID) 125 MCG tablet  levothyroxine (SYNTHROID/LEVOTHROID) 150 MCG tablet  levothyroxine (SYNTHROID/LEVOTHROID) 150 MCG tablet  methylPREDNISolone (MEDROL DOSEPAK) 4 MG tablet therapy pack          Review of Systems   Constitutional:  Negative for chills, diaphoresis and fever.   HENT:  Negative for ear pain, sinus pressure and sore throat.    Eyes:  Negative for visual disturbance.   Respiratory:  Negative for cough, shortness of breath and wheezing.   "  Cardiovascular:  Positive for chest pain. Negative for palpitations.   Gastrointestinal:  Negative for abdominal pain, blood in stool, constipation, diarrhea, nausea and vomiting.   Genitourinary:  Negative for dysuria, frequency and urgency.   Skin:  Negative for rash.   Neurological:  Negative for headaches.   All other systems reviewed and are negative.      Physical Exam   BP: 128/74  Pulse: 94  Temp: 98.3  F (36.8  C)  Resp: 16  Height: 162.6 cm (5' 4\")  Weight: 65.8 kg (145 lb)  SpO2: 98 %      Physical Exam  Constitutional:       General: She is in acute distress.      Appearance: She is not diaphoretic.   Eyes:      Conjunctiva/sclera: Conjunctivae normal.   Cardiovascular:      Rate and Rhythm: Normal rate and regular rhythm.      Heart sounds: No murmur heard.  Pulmonary:      Effort: No respiratory distress.      Breath sounds: No stridor. No wheezing or rhonchi.   Abdominal:      General: Abdomen is flat. There is no distension.      Palpations: Abdomen is soft. There is no mass.      Tenderness: There is no abdominal tenderness. There is no guarding.   Musculoskeletal:      Cervical back: Neck supple.      Right lower leg: No edema.   Skin:     Coloration: Skin is not pale.      Findings: No rash.   Neurological:      Mental Status: She is alert.      Motor: No weakness.         ED Course        Procedures                EKG Interpretation:      Interpreted by Shaq Olivia MD  EKG done at 1006 hrs. demonstrates a sinus rhythm 89 bpm normal axis.  No ST change no T wave changes.  Normal R progression and no Q waves.  Normal intervals.  Normal conduction.  No ectopy.  Impression sinus rhythm 89 bpm no significant acute changes.      Critical Care time:  none              Results for orders placed or performed during the hospital encounter of 12/14/24 (from the past 24 hours)   EKG 12-lead, tracing only   Result Value Ref Range    Systolic Blood Pressure  mmHg    Diastolic Blood Pressure  mmHg    " Ventricular Rate 89 BPM    Atrial Rate 89 BPM    MT Interval 156 ms    QRS Duration 82 ms     ms    QTc 452 ms    P Axis 41 degrees    R AXIS 18 degrees    T Axis 34 degrees    Interpretation ECG       Sinus rhythm  Normal ECG  No previous ECGs available  Confirmed by SEE ED PROVIDER NOTE FOR, ECG INTERPRETATION ([id number]),  [employer] ([id number]) on 12/14/2024 10:36:22 AM     CBC with Platelets & Differential    Narrative    The following orders were created for panel order CBC with Platelets & Differential.  Procedure                               Abnormality         Status                     ---------                               -----------         ------                     CBC with platelets and d...[[id number]]                      Final result                 Please view results for these tests on the individual orders.   Basic metabolic panel   Result Value Ref Range    Sodium 141 135 - 145 mmol/L    Potassium 4.3 3.4 - 5.3 mmol/L    Chloride 104 98 - 107 mmol/L    Carbon Dioxide (CO2) 26 22 - 29 mmol/L    Anion Gap 11 7 - 15 mmol/L    Urea Nitrogen 13.1 6.0 - 20.0 mg/dL    Creatinine 0.79 0.51 - 0.95 mg/dL    GFR Estimate >90 >60 mL/min/1.73m2    Calcium 8.4 (L) 8.8 - 10.4 mg/dL    Glucose 108 (H) 70 - 99 mg/dL   Troponin T, High Sensitivity   Result Value Ref Range    Troponin T, High Sensitivity <6 <=14 ng/L   [facility] Draw    Narrative    The following orders were created for panel order [place] Draw.  Procedure                               Abnormality         Status                     ---------                               -----------         ------                     Extra Blue Top Tube[[id number]]                              Final result               Extra Red Top Tube[[id number]]                               Final result                 Please view results for these tests on the individual orders.   CBC with platelets and differential   Result Value Ref Range    WBC Count 5.1 4.0 -  11.0 10e3/uL    RBC Count 4.98 3.80 - 5.20 10e6/uL    Hemoglobin 14.6 11.7 - 15.7 g/dL    Hematocrit 42.1 35.0 - 47.0 %    MCV 85 78 - 100 fL    MCH 29.3 26.5 - 33.0 pg    MCHC 34.7 31.5 - 36.5 g/dL    RDW 12.2 10.0 - 15.0 %    Platelet Count 226 150 - 450 10e3/uL    % Neutrophils 58 %    % Lymphocytes 25 %    % Monocytes 15 %    % Eosinophils 2 %    % Basophils 1 %    % Immature Granulocytes 0 %    NRBCs per 100 WBC 0 <1 /100    Absolute Neutrophils 2.9 1.6 - 8.3 10e3/uL    Absolute Lymphocytes 1.3 0.8 - 5.3 10e3/uL    Absolute Monocytes 0.8 0.0 - 1.3 10e3/uL    Absolute Eosinophils 0.1 0.0 - 0.7 10e3/uL    Absolute Basophils 0.0 0.0 - 0.2 10e3/uL    Absolute Immature Granulocytes 0.0 <=0.4 10e3/uL    Absolute NRBCs 0.0 10e3/uL   Extra Blue Top Tube   Result Value Ref Range    Hold Specimen JIC    Extra Red Top Tube   Result Value Ref Range    Hold Specimen JIC    Hepatic function panel   Result Value Ref Range    Protein Total 6.8 6.4 - 8.3 g/dL    Albumin 4.3 3.5 - 5.2 g/dL    Bilirubin Total 0.6 <=1.2 mg/dL    Alkaline Phosphatase 56 40 - 150 U/L    AST 19 0 - 45 U/L    ALT 28 0 - 50 U/L    Bilirubin Direct <0.20 0.00 - 0.30 mg/dL   Lipase   Result Value Ref Range    Lipase 40 13 - 60 U/L   D dimer quantitative   Result Value Ref Range    D-Dimer Quantitative 0.33 0.00 - 0.50 ug/mL FEU    Narrative    This D-dimer assay is intended for use in conjunction with a clinical pretest probability assessment model to exclude pulmonary embolism (PE) and deep venous thrombosis (DVT) in outpatients suspected of PE or DVT. The cut-off value is 0.50 ug/mL FEU.   TSH with free T4 reflex   Result Value Ref Range    TSH 0.78 0.30 - 4.20 uIU/mL   Chest XR,  PA & LAT    Narrative    EXAM: XR CHEST 2 VIEWS  LOCATION: [facility]  DATE: 12/14/2024    INDICATION: chest pain  COMPARISON: None.      Impression    IMPRESSION: Negative chest.       Medications - No data to display    Assessments & Plan (with  Medical Decision Making)     MDM; Kimberley Choe is a 44 year old female presenting with chest pain without significant associated symptoms onset 2 days ago nonexertional.  Some risk for VTE but negative D-dimer normal troponin normal EKG.  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  Plan for broad-based evaluation.    Evaluation is reassuring with negative D-dimer, troponin, chest x-ray, EKG, other laboratory testing.  We discussed the alternative diagnoses such as chest wall pain, gastroesophageal reflux.       She does have a hiatal hernia and likely higher risk for gastroesophageal reflux.  Discussed management as below precautions for return.    I have reviewed the nursing notes.    I have reviewed the findings, diagnosis, plan and need for follow up with the patient.           Medical Decision Making  The patient's presentation was of moderate complexity (an acute illness with systemic symptoms).    The patient's evaluation involved:  ordering and/or review of 3+ test(s) in this encounter (see separate area of note for details)    The patient's management necessitated only low risk treatment.        New Prescriptions    No medications on file       Final diagnoses:   Non-cardiac chest pain - reassuring eval.  follow-up clinic 1-2 weeks. suspect GERD given hiatal hernia.  take 64 oz fluid per day avoiding caffeine, no food 2 hours before bed. Prilosec 20 mg orally daily for 4 weeks.  also chest wall pain amd trapezius spasm may be causing some syumptoms. consider Debra,. behzad, trapezius spasm.       12/14/2024   [facility] EMERGENCY DEPT       Shaq Olivia MD  12/14/24 [id number]

## 2024-12-17 ENCOUNTER — TELEPHONE (OUTPATIENT)
Dept: FAMILY MEDICINE | Facility: CLINIC | Age: 44
End: 2024-12-17
Payer: COMMERCIAL

## 2024-12-17 NOTE — TELEPHONE ENCOUNTER
Attempted to reach pt to complete hospital follow-up call. There was no answer. Left message to return call to a nurse at [phone].    If pt returns call please complete the following:    Please complete Post Discharge Assessment questionnaire found in Screenings tab.      2.  After completing Post Discharge Assessment questionnaire, please enter the following dot phrase and to complete note (.[email or web address]):      CHARANJIT GunnN, RN

## 2024-12-18 NOTE — TELEPHONE ENCOUNTER
Transitions of Care Outreach  Chief Complaint   Patient presents with    Hospital F/U       Most Recent Admission Date: 12/14/2024   Most Recent Admission Diagnosis:      Most Recent Discharge Date: 12/14/2024   Most Recent Discharge Diagnosis: Non-cardiac chest pain - R07.89     Transitions of Care Assessment         Follow up Plan          Future Appointments   Date Time Provider Department Center   1/9/2025  9:00 AM Veronique Brown, [facility]       Outpatient Plan as outlined on AVS reviewed with patient.    For any urgent concerns, please contact our 24 hour nurse triage line: [phone] ([phone])       Gini Muniz RN

## 2025-01-24 ENCOUNTER — OFFICE VISIT (OUTPATIENT)
Dept: FAMILY MEDICINE | Facility: CLINIC | Age: 45
End: 2025-01-24
Payer: COMMERCIAL

## 2025-01-24 VITALS
SYSTOLIC BLOOD PRESSURE: 122 MMHG | RESPIRATION RATE: 20 BRPM | OXYGEN SATURATION: 98 % | BODY MASS INDEX: 26.12 KG/M2 | HEIGHT: 64 IN | DIASTOLIC BLOOD PRESSURE: 83 MMHG | WEIGHT: 153 LBS | HEART RATE: 74 BPM | TEMPERATURE: 98.5 F

## 2025-01-24 DIAGNOSIS — G90.A POTS (POSTURAL ORTHOSTATIC TACHYCARDIA SYNDROME): ICD-10-CM

## 2025-01-24 DIAGNOSIS — R06.83 SNORING: ICD-10-CM

## 2025-01-24 DIAGNOSIS — R53.83 FATIGUE, UNSPECIFIED TYPE: ICD-10-CM

## 2025-01-24 DIAGNOSIS — R14.0 BLOATED ABDOMEN: ICD-10-CM

## 2025-01-24 DIAGNOSIS — G47.30 SLEEP APNEA, UNSPECIFIED TYPE: ICD-10-CM

## 2025-01-24 DIAGNOSIS — F40.243 FEAR OF FLYING: ICD-10-CM

## 2025-01-24 DIAGNOSIS — Z12.31 ENCOUNTER FOR SCREENING MAMMOGRAM FOR BREAST CANCER: ICD-10-CM

## 2025-01-24 DIAGNOSIS — E03.9 ACQUIRED HYPOTHYROIDISM: ICD-10-CM

## 2025-01-24 DIAGNOSIS — K90.0 CELIAC DISEASE: ICD-10-CM

## 2025-01-24 DIAGNOSIS — Z00.00 ROUTINE GENERAL MEDICAL EXAMINATION AT A HEALTH CARE FACILITY: Primary | ICD-10-CM

## 2025-01-24 DIAGNOSIS — Z28.21 IMMUNIZATION DECLINED: ICD-10-CM

## 2025-01-24 DIAGNOSIS — Z13.220 LIPID SCREENING: ICD-10-CM

## 2025-01-24 DIAGNOSIS — R52 BODY ACHES: ICD-10-CM

## 2025-01-24 DIAGNOSIS — Z12.11 COLON CANCER SCREENING: ICD-10-CM

## 2025-01-24 PROBLEM — D50.9 IRON DEFICIENCY ANEMIA, UNSPECIFIED IRON DEFICIENCY ANEMIA TYPE: Status: RESOLVED | Noted: 2017-12-13 | Resolved: 2025-01-24

## 2025-01-24 PROBLEM — N80.9 ENDOMETRIOSIS DETERMINED BY LAPAROSCOPY: Status: RESOLVED | Noted: 2021-10-05 | Resolved: 2025-01-24

## 2025-01-24 PROCEDURE — 99396 PREV VISIT EST AGE 40-64: CPT | Performed by: PHYSICIAN ASSISTANT

## 2025-01-24 PROCEDURE — 99214 OFFICE O/P EST MOD 30 MIN: CPT | Mod: 25 | Performed by: PHYSICIAN ASSISTANT

## 2025-01-24 RX ORDER — LEVOTHYROXINE SODIUM 125 UG/1
125 TABLET ORAL DAILY
Qty: 90 TABLET | Refills: 3 | Status: SHIPPED | OUTPATIENT
Start: 2025-01-24

## 2025-01-24 RX ORDER — BUSPIRONE HYDROCHLORIDE 5 MG/1
5 TABLET ORAL 3 TIMES DAILY
Qty: 30 TABLET | Refills: 0 | Status: SHIPPED | OUTPATIENT
Start: 2025-01-24

## 2025-01-24 SDOH — HEALTH STABILITY: PHYSICAL HEALTH: ON AVERAGE, HOW MANY MINUTES DO YOU ENGAGE IN EXERCISE AT THIS LEVEL?: 20 MIN

## 2025-01-24 SDOH — HEALTH STABILITY: PHYSICAL HEALTH: ON AVERAGE, HOW MANY DAYS PER WEEK DO YOU ENGAGE IN MODERATE TO STRENUOUS EXERCISE (LIKE A BRISK WALK)?: 7 DAYS

## 2025-01-24 ASSESSMENT — SOCIAL DETERMINANTS OF HEALTH (SDOH): HOW OFTEN DO YOU GET TOGETHER WITH FRIENDS OR RELATIVES?: THREE TIMES A WEEK

## 2025-01-24 ASSESSMENT — PATIENT HEALTH QUESTIONNAIRE - PHQ9
10. IF YOU CHECKED OFF ANY PROBLEMS, HOW DIFFICULT HAVE THESE PROBLEMS MADE IT FOR YOU TO DO YOUR WORK, TAKE CARE OF THINGS AT HOME, OR GET ALONG WITH OTHER PEOPLE: NOT DIFFICULT AT ALL
SUM OF ALL RESPONSES TO PHQ QUESTIONS 1-9: 6
SUM OF ALL RESPONSES TO PHQ QUESTIONS 1-9: 6

## 2025-01-24 ASSESSMENT — PAIN SCALES - GENERAL: PAINLEVEL_OUTOF10: NO PAIN (0)

## 2025-01-24 NOTE — PROGRESS NOTES
"Preventive Care Visit  [facility]  KYLAH FUENTES PA-C, Physician Assistant - Medical  Jan 24, 2025      Assessment & Plan     Routine general medical examination at a health care facility  Continue eye and dental care  Continue working on diet and physical activity.  PAP UTD    Lipid screening  - Lipid Profile (Chol, Trig, HDL, LDL calc); Future    Encounter for screening mammogram for breast cancer  Mammogram already scheduled for Jan 28th    Immunization declined  Covid and Flu    Colon cancer screening  Ordered to release in August. No bowel changes.   - Colonoscopy Screening  Referral; Future    Fear of flying  Trial buspirone to see if helpful   - busPIRone (BUSPAR) 5 MG tablet; Take 1 tablet (5 mg) by mouth 3 times daily.    Bloated abdomen  Will verify no ovarian pathology causing lower abdominal bloating and pressure.   - US Pelvic Complete with Transvaginal; Future  - Comprehensive metabolic panel (BMP + Alb, Alk Phos, ALT, AST, Total. Bili, TP); Future    Fatigue, unspecified type  - Vitamin D Deficiency; Future  - Zinc; Future  - Vitamin B12; Future  - Folate; Future    Body aches  - Parathyroid Hormone Intact; Future    Celiac disease  - Zinc; Future  - Vitamin B12; Future  - Folate; Future  - Adult GI  Referral - Procedure Only; Future    Acquired hypothyroidism  - levothyroxine (SYNTHROID/LEVOTHROID) 125 MCG tablet; Take 1 tablet (125 mcg) by mouth daily.  - US Thyroid; Future    Snoring  - Adult Sleep Eval & Management  Referral; Future    Sleep apnea, unspecified type  - Adult Sleep Eval & Management  Referral; Future    POTS (postural orthostatic tachycardia syndrome)  No tachycardia today. Seems fairly well controlled.     BMI  Estimated body mass index is 26.26 kg/m  as calculated from the following:    Height as of this encounter: 1.626 m (5' 4\").    Weight as of this encounter: 69.4 kg (153 lb).       Counseling  Appropriate " preventive services were addressed with this patient via screening, questionnaire, or discussion as appropriate for fall prevention, nutrition, physical activity, Tobacco-use cessation, social engagement, weight loss and cognition.  Checklist reviewing preventive services available has been given to the patient.  Reviewed patient's diet, addressing concerns and/or questions.   She is at risk for psychosocial distress and has been provided with information to reduce risk.   The patient's PHQ-9 score is consistent with mild depression. She was provided with information regarding depression.       Gareth Lincoln is a 44 year old, presenting for the following:  Physical        1/24/2025     9:52 AM   Additional Questions   Roomed by minh   Accompanied by self         1/24/2025     9:52 AM   Patient Reported Additional Medications   Patient reports taking the following new medications see chart          Latonia is a very pleasant 44 year old female who presents to clinic for her preventive exam and to discuss some health issues.     [facility] Physical Exam:    Date of last exam: Jan 2024    LMP: Hysterectomy 2021. Feeling low pressure in abdomen. Ovaries still intact.   STD Screening: No concerns    PAP/Cervical Cancer Screening: No longer indicated  Mammogram: Scheduled 1/28/25  Colon Cancer Screening: Open to colonoscopy and needs EGD for celiac disease. Will need increased sedation. Woke up during last EGD.     Supplements: Taking a little vitamin D because fatigued.   Cholesterol Screening:   Lab Results       Component                Value               Date                       CHOL                     176                 11/30/2022                 CHOL                     155                 10/07/2020            Lab Results       Component                Value               Date                       HDL                      58                  11/30/2022                 HDL                      44        "           10/07/2020            Lab Results       Component                Value               Date                       LDL                      102                 11/30/2022                 LDL                      87                  10/07/2020            Lab Results       Component                Value               Date                       TRIG                     82                  11/30/2022                 TRIG                     120                 10/07/2020            Lab Results       Component                Value               Date                       GEORGEO             4.8                 07/18/2013              Diabetes Screening:   No results found for: \"A1C\"  Due    Depression and Anxiety Screening: See below.    Other Concerns: Latonia has been struggling with a \"cloudy\" feeling. She is very that she has nothing to be sad about, but is having trouble being motivated to do the things she used to enjoy. She has low energy. She has muscle soreness. She has developed tingling of the fingers and toes at times. She was at a horse show in [place] and her right arm felt like it was on fire (from the inside) but it was not warm to touch. She has had dry eyes (which she is seeing ophthalmology for), dry skin, and not sleeping well. IN fact, she has been waking up gasping for air at night. She has a hx of a partial thyroidectomy as well as anterior lymph nodes removal. At one point after the surgery, she was on [place] thyroid but was switched to levothyroxine. Last year, her TSH was quite low and we changed the generic to brand name Synthroid. She wonders if she just is not tolerating that well.     In addition to the above, Latonia was in the ER in mid December for chest pains. They told her that her calcium level was low and she has started taking calcium as a supplement as well as vitamin D.     No fevers, weight loss or other B type symptoms. Has gained some weight this year.     Never took " Xanax (has fear of flying). Hydroxyzine just made her tired (like taking Benadryl). Interested in something else she can take if needed.           Health Care Directive  Patient does not have a Health Care Directive: Discussed advance care planning with patient; however, patient declined at this time.      1/24/2025   General Health   How would you rate your overall physical health? (!) FAIR   Feel stress (tense, anxious, or unable to sleep) Rather much   (!) STRESS CONCERN      1/24/2025   Nutrition   Three or more servings of calcium each day? (!) NO   Diet: Gluten-free/reduced   How many servings of fruit and vegetables per day? (!) 2-3   How many sweetened beverages each day? 0-1         1/24/2025   Exercise   Days per week of moderate/strenous exercise 7 days   Average minutes spent exercising at this level 20 min         1/24/2025   Social Factors   Frequency of gathering with friends or relatives Three times a week   Worry food won't last until get money to buy more No   Food not last or not have enough money for food? No   Do you have housing? (Housing is defined as stable permanent housing and does not include staying ouside in a car, in a tent, in an abandoned building, in an overnight shelter, or couch-surfing.) Yes   Are you worried about losing your housing? No   Lack of transportation? No   Unable to get utilities (heat,electricity)? No         1/24/2025   Dental   Dentist two times every year? Yes         1/24/2025   TB Screening   Were you born outside of the [place]? No       Today's PHQ-9 Score:       1/24/2025     9:49 AM   PHQ-9 SCORE   PHQ-9 Total Score MyChart 6 (Mild depression)   PHQ-9 Total Score 6        Patient-reported         1/24/2025   Substance Use   Alcohol more than 3/day or more than 7/wk Not Applicable   Do you use any other substances recreationally? No     Social History     Tobacco Use    Smoking status: Never     Passive exposure: Never    Smokeless tobacco: Never   Vaping Use     Vaping status: Never Used   Substance Use Topics    Alcohol use: Yes     Comment: rare    Drug use: No           1/10/2024   LAST FHS-7 RESULTS   1st degree relative breast or ovarian cancer No   Any relative bilateral breast cancer No   Any male have breast cancer No   Any ONE woman have BOTH breast AND ovarian cancer No   Any woman with breast cancer before 50yrs No   2 or more relatives with breast AND/OR ovarian cancer No   2 or more relatives with breast AND/OR bowel cancer No       Mammogram Screening - Mammogram every 1-2 years updated in Health Maintenance based on mutual decision making        1/24/2025   STI Screening   New sexual partner(s) since last STI/HIV test? No     History of abnormal Pap smear: Status post hysterectomy with removal of cervix and no history of CIN2 or greater or cervical cancer. Health Maintenance and Surgical History updated.        Latest Ref Rng & Units 3/11/2019    11:23 AM 3/11/2019    11:15 AM 3/10/2016    11:20 AM   PAP / HPV   PAP (Historical)  NIL      HPV 16 DNA NEG^Negative  Negative  Negative    HPV 18 DNA NEG^Negative  Negative  Negative    Other HR HPV NEG^Negative  Negative  Negative      ASCVD Risk   The 10-year ASCVD risk score (Mingo RODARTE, et al., 2019) is: 0.5%    Values used to calculate the score:      Age: 44 years      Sex: Female      Is Non- : No      Diabetic: No      Tobacco smoker: No      Systolic Blood Pressure: 122 mmHg      Is BP treated: No      HDL Cholesterol: 58 mg/dL      Total Cholesterol: 176 mg/dL    Reviewed and updated as needed this visit by Provider      Past Medical History:   Diagnosis Date    Anemia 06/17/2010    Better after diagnosis of celiac disease      Anemia, unspecified     with pregnancy 6/06    Arthritis 2006    Right hip    Celiac disease     Dysmenorrhea 02/01/2012    Endometriosis determined by laparoscopy 10/05/2021    S/p LAPAROSCOPIC ASSISTED VAGINAL HYSTERECTOMY, bilat salpingectomy,  excision of endometriosis       GERD (gastroesophageal reflux disease) 2014    Papanicolaou smear of cervix with low grade squamous intraepithelial lesion (LGSIL) 2009    Colpo negative    POTS (postural orthostatic tachycardia syndrome) 2025    Right hip labral tear 2006    Unspecified closed fracture of ankle     Left foot    Unspecified disorder of thyroid 10/20/2002    Thyroid disease, goiter nodules, S/p thyroidectomy      Past Surgical History:   Procedure Laterality Date    BIOPSY      Liver    ENT SURGERY      Thyroidectomy    ESOPHAGOSCOPY, GASTROSCOPY, DUODENOSCOPY (EGD), COMBINED  2010    EGD    LAPAROSCOPIC ABLATION ENDOMETRIOSIS  10/05/2021    Procedure: cautery of endometriosis;  Surgeon: Tasha Dewey MD;  Location: [facility]    LAPAROSCOPIC ASSISTED HYSTERECTOMY VAGINAL N/A 2021    LAVH-bilat salpingectomy, removal of endometriosis    LAPAROSCOPY DIAGNOSTIC (GYN) N/A 10/05/2021    Procedure: Diagnostic laparoscopy;  Surgeon: Tasha Dewey MD;  Location: [facility]    THYROIDECTOMY       S/p thyroidectomy     ZZC HIP ARTHROSCOPY, DX  2008    (R) Anterior superior labral debridement.      OB History    Para Term  AB Living   3 3 2 1 0 3   SAB IAB Ectopic Multiple Live Births   0 0 0 0 3      # Outcome Date GA Lbr Devendra/2nd Weight Sex Type Anes PTL Lv   3 Term 09/16/10 38w2d 06:08 3.629 kg (8 lb) F IVD EPI  MUNA      Name: Sanjana      Apgar1: 8  Apgar5: 9   2 Term 06 38w0d 01:07 3.118 kg (6 lb 14 oz) F IVD   MUNA      Birth Comments: IVD-PIH, severe anemia, increasing hip pain       Name: Marina       Apgar1: 8  Apgar5: 9   1  05 35w0d 13:00 2.92 kg (6 lb 7 oz) M VACUUM   MUNA      Birth Comments: vacuum-rhogam      Name: Bryce     BP Readings from Last 3 Encounters:   25 122/83   24 128/74   06/10/24 125/84    Wt Readings from Last 3 Encounters:   25 69.4 kg (153 lb)   24 65.8  kg (145 lb)   06/10/24 65.8 kg (145 lb)                  Patient Active Problem List   Diagnosis    Undiagnosed cardiac murmurs    CARDIOVASCULAR SCREENING; LDL GOAL LESS THAN 160    Celiac disease    Fatty liver disease, nonalcoholic    Hypothyroid    FHx: congenital heart disease    Vitamin D deficiency    Pelvic pain in female    POTS (postural orthostatic tachycardia syndrome)     Past Surgical History:   Procedure Laterality Date    BIOPSY  2014    Liver    ENT SURGERY  2000    Thyroidectomy    ESOPHAGOSCOPY, GASTROSCOPY, DUODENOSCOPY (EGD), COMBINED  11/01/2010    EGD    LAPAROSCOPIC ABLATION ENDOMETRIOSIS  10/05/2021    Procedure: cautery of endometriosis;  Surgeon: Tasha Dewey MD;  Location: [facility]    LAPAROSCOPIC ASSISTED HYSTERECTOMY VAGINAL N/A 12/02/2021    LAVH-bilat salpingectomy, removal of endometriosis    LAPAROSCOPY DIAGNOSTIC (GYN) N/A 10/05/2021    Procedure: Diagnostic laparoscopy;  Surgeon: Tasha Dewey MD;  Location: [facility]    THYROIDECTOMY   2002    S/p thyroidectomy 2002    ZZC HIP ARTHROSCOPY, DX  06/2008    (R) Anterior superior labral debridement.        Social History     Tobacco Use    Smoking status: Never     Passive exposure: Never    Smokeless tobacco: Never   Substance Use Topics    Alcohol use: Yes     Comment: rare     Family History   Problem Relation Age of Onset    Gastrointestinal Disease Mother         celiac    Liver Disease Mother         PVC    Gallbladder Disease Mother     Hypertension Father     Arthritis Father     Heart Disease Father         cardimyopthy age 60, arrythmias    Lipids Father     Neurologic Disorder Sister         brain tumor    Gallbladder Disease Sister     Diabetes Maternal Grandmother     Alcohol/Drug Maternal Grandmother     Gastrointestinal Disease Paternal Grandmother         IBS    Hypertension Paternal Grandmother     Cerebrovascular Disease Paternal Grandmother     Alzheimer Disease Paternal Grandmother     C.A.D. No  family hx of     Breast Cancer No family hx of     Cancer - colorectal No family hx of     Anesthesia Reaction No family hx of     Bleeding Disorder No family hx of          Current Outpatient Medications   Medication Sig Dispense Refill    ALPRAZolam (XANAX) 0.25 MG tablet Take one tab 30 minutes before flying for anxiety related to flying. Do NOT drink alcohol while taking this medication. 5 tablet 0    busPIRone (BUSPAR) 5 MG tablet Take 1 tablet (5 mg) by mouth 3 times daily. 30 tablet 0    levothyroxine (SYNTHROID/LEVOTHROID) 125 MCG tablet Take 1 tablet (125 mcg) by mouth daily. 90 tablet 3     Allergies   Allergen Reactions    Gluten Other (See Comments)     Celiac disease    Macrobid [Nitrofuran Derivatives] Nausea and Vomiting    Zithromax [Azithromycin Dihydrate] Nausea and Vomiting     Recent Labs   Lab Test 12/14/24  1011 10/06/24  1055 04/16/24  1022 01/11/24  1219 02/27/23  1132 02/27/23  1129 11/30/22  0913 03/12/21  1125 11/27/20  0825 10/07/20  1055 07/28/20  1458 02/20/20  0902 03/12/19  0929   LDL  --   --   --   --   --   --  102*  --   --  87  --   --  103*   HDL  --   --   --   --   --   --  58  --   --  44*  --   --  52   TRIG  --   --   --   --   --   --  82  --   --  120  --   --  55   ALT 28  --   --  27  --  37 25   < > 27  --   --  33  --    CR 0.79  --   --  0.69  --  0.72 0.66   < > 0.77  --   --  0.72 0.65   GFRESTIMATED >90  --   --  >90  --  >90 >90   < > >90  --   --  >90 >90   GFRESTBLACK  --   --   --   --   --   --   --   --  >90  --   --  >90 >90   POTASSIUM 4.3  --   --  4.5  --  4.1 4.0   < > 4.1  --   --  4.0 4.4   TSH 0.78 11.10*   < > 0.35   < >  --  6.59*   < >  --  2.95   < >  --  1.44    < > = values in this interval not displayed.          Review of Systems  Constitutional, HEENT, cardiovascular, pulmonary, GI, , musculoskeletal, neuro, skin, endocrine and psych systems are negative, except as otherwise noted.     Objective    Exam  /83   Pulse 74   Temp 98.5  " F (36.9  C) (Oral)   Resp 20   Ht 1.626 m (5' 4\")   Wt 69.4 kg (153 lb)   LMP 11/27/2021   SpO2 98%   BMI 26.26 kg/m     Estimated body mass index is 26.26 kg/m  as calculated from the following:    Height as of this encounter: 1.626 m (5' 4\").    Weight as of this encounter: 69.4 kg (153 lb).    Physical Exam  Vitals reviewed.   Constitutional:       Appearance: Normal appearance. She is not ill-appearing or toxic-appearing.   HENT:      Right Ear: Tympanic membrane normal.      Left Ear: Tympanic membrane normal.      Ears:      Comments: Excess cerumen bilaterally     Nose: Nose normal.      Mouth/Throat:      Mouth: Mucous membranes are moist.      Pharynx: No oropharyngeal exudate or posterior oropharyngeal erythema.      Comments: Mallampati III  Eyes:      Comments: Dry eyes (seeing Optho)   Neck:      Comments: Residual thyroid visible, possibly nodular. Pt has hx of nodular goiter with Hashimotos and had partial thyroidectomy and lymph node dissection of the neck.   Cardiovascular:      Rate and Rhythm: Normal rate and regular rhythm.      Heart sounds: Normal heart sounds. No murmur heard.  Pulmonary:      Effort: Pulmonary effort is normal.      Breath sounds: Normal breath sounds.   Abdominal:      General: Abdomen is flat.      Palpations: Abdomen is soft.      Comments: LLQ ttp without guarding or rebound.   Musculoskeletal:      Right lower leg: No edema.      Left lower leg: No edema.   Skin:     General: Skin is warm and dry.   Neurological:      General: No focal deficit present.      Mental Status: She is alert and oriented to person, place, and time.      Cranial Nerves: No cranial nerve deficit.   Psychiatric:         Mood and Affect: Mood normal.         Behavior: Behavior normal.         Thought Content: Thought content normal.         Judgment: Judgment normal.           Signed Electronically by: KYLAH FUENTES PA-C    Answers submitted by the patient for this visit:  Patient " Health Questionnaire (Submitted on 1/24/2025)  If you checked off any problems, how difficult have these problems made it for you to do your work, take care of things at home, or get along with other people?: Not difficult at all  PHQ9 TOTAL SCORE: 6

## 2025-01-24 NOTE — PATIENT INSTRUCTIONS
Patient Education   Preventive Care Advice   This is general advice given by our system to help you stay healthy. However, your care team may have specific advice just for you. Please talk to your care team about your preventive care needs.  Nutrition  Eat 5 or more servings of fruits and vegetables each day.  Try wheat bread, brown rice and whole grain pasta (instead of white bread, rice, and pasta).  Get enough calcium and vitamin D. Check the label on foods and aim for 100% of the RDA (recommended daily allowance).  Lifestyle  Exercise at least 150 minutes each week  (30 minutes a day, 5 days a week).  Do muscle strengthening activities 2 days a week. These help control your weight and prevent disease.  No smoking.  Wear sunscreen to prevent skin cancer.  Have a dental exam and cleaning every 6 months.  Yearly exams  See your health care team every year to talk about:  Any changes in your health.  Any medicines your care team has prescribed.  Preventive care, family planning, and ways to prevent chronic diseases.  Shots (vaccines)   HPV shots (up to age 26), if you've never had them before.  Hepatitis B shots (up to age 59), if you've never had them before.  COVID-19 shot: Get this shot when it's due.  Flu shot: Get a flu shot every year.  Tetanus shot: Get a tetanus shot every 10 years.  Pneumococcal, hepatitis A, and RSV shots: Ask your care team if you need these based on your risk.  Shingles shot (for age 50 and up)  General health tests  Diabetes screening:  Starting at age 35, Get screened for diabetes at least every 3 years.  If you are younger than age 35, ask your care team if you should be screened for diabetes.  Cholesterol test: At age 39, start having a cholesterol test every 5 years, or more often if advised.  Bone density scan (DEXA): At age 50, ask your care team if you should have this scan for osteoporosis (brittle bones).  Hepatitis C: Get tested at least once in your life.  STIs (sexually  transmitted infections)  Before age 24: Ask your care team if you should be screened for STIs.  After age 24: Get screened for STIs if you're at risk. You are at risk for STIs (including HIV) if:  You are sexually active with more than one person.  You don't use condoms every time.  You or a partner was diagnosed with a sexually transmitted infection.  If you are at risk for HIV, ask about PrEP medicine to prevent HIV.  Get tested for HIV at least once in your life, whether you are at risk for HIV or not.  Cancer screening tests  Cervical cancer screening: If you have a cervix, begin getting regular cervical cancer screening tests starting at age 21.  Breast cancer scan (mammogram): If you've ever had breasts, begin having regular mammograms starting at age 40. This is a scan to check for breast cancer.  Colon cancer screening: It is important to start screening for colon cancer at age 45.  Have a colonoscopy test every 10 years (or more often if you're at risk) Or, ask your provider about stool tests like a FIT test every year or Cologuard test every 3 years.  To learn more about your testing options, visit:   .  For help making a decision, visit:   https://bit.ly/dp17758.  Prostate cancer screening test: If you have a prostate, ask your care team if a prostate cancer screening test (PSA) at age 55 is right for you.  Lung cancer screening: If you are a current or former smoker ages 50 to 80, ask your care team if ongoing lung cancer screenings are right for you.  For informational purposes only. Not to replace the advice of your health care provider. Copyright   2023 [facility]. All rights reserved. Clinically reviewed by the [facility]. [place]pper 915550 - REV 01/24.  Learning About Stress  What is stress?     Stress is your body's response to a hard situation. Your body can have a physical, emotional, or mental response. Stress is a fact of life for most people, and it  affects everyone differently. What causes stress for you may not be stressful for someone else.  A lot of things can cause stress. You may feel stress when you go on a job interview, take a test, or run a race. This kind of short-term stress is normal and even useful. It can help you if you need to work hard or react quickly. For example, stress can help you finish an important job on time.  Long-term stress is caused by ongoing stressful situations or events. Examples of long-term stress include long-term health problems, ongoing problems at work, or conflicts in your family. Long-term stress can harm your health.  How does stress affect your health?  When you are stressed, your body responds as though you are in danger. It makes hormones that speed up your heart, make you breathe faster, and give you a burst of energy. This is called the fight-or-flight stress response. If the stress is over quickly, your body goes back to normal and no harm is done.  But if stress happens too often or lasts too long, it can have bad effects. Long-term stress can make you more likely to get sick, and it can make symptoms of some diseases worse. If you tense up when you are stressed, you may develop neck, shoulder, or low back pain. Stress is linked to high blood pressure and heart disease.  Stress also harms your emotional health. It can make you garcia, tense, or depressed. Your relationships may suffer, and you may not do well at work or school.  What can you do to manage stress?  You can try these things to help manage stress:   Do something active. Exercise or activity can help reduce stress. Walking is a great way to get started. Even everyday activities such as housecleaning or yard work can help.  Try yoga or marshal chi. These techniques combine exercise and meditation. You may need some training at first to learn them.  Do something you enjoy. For example, listen to music or go to a movie. Practice your hobby or do volunteer  "work.  Meditate. This can help you relax, because you are not worrying about what happened before or what may happen in the future.  Do guided imagery. Imagine yourself in any setting that helps you feel calm. You can use online videos, books, or a teacher to guide you.  Do breathing exercises. For example:  From a standing position, bend forward from the waist with your knees slightly bent. Let your arms dangle close to the floor.  Breathe in slowly and deeply as you return to a standing position. Roll up slowly and lift your head last.  Hold your breath for just a few seconds in the standing position.  Breathe out slowly and bend forward from the waist.  Let your feelings out. Talk, laugh, cry, and express anger when you need to. Talking with supportive friends or family, a counselor, or a joby leader about your feelings is a healthy way to relieve stress. Avoid discussing your feelings with people who make you feel worse.  Write. It may help to write about things that are bothering you. This helps you find out how much stress you feel and what is causing it. When you know this, you can find better ways to cope.  What can you do to prevent stress?  You might try some of these things to help prevent stress:  Manage your time. This helps you find time to do the things you want and need to do.  Get enough sleep. Your body recovers from the stresses of the day while you are sleeping.  Get support. Your family, friends, and community can make a difference in how you experience stress.  Limit your news feed. Avoid or limit time on social media or news that may make you feel stressed.  Do something active. Exercise or activity can help reduce stress. Walking is a great way to get started.  Where can you learn more?  Go to https://www.[employer].net/patiented  Enter N032 in the search box to learn more about \"Learning About Stress.\"  Current as of: October 24, 2023  Content Version: 14.3    2024 [place]. "   Care instructions adapted under license by your healthcare professional. If you have questions about a medical condition or this instruction, always ask your healthcare professional. [employer] disclaims any warranty or liability for your use of this information.

## 2025-01-24 NOTE — PROGRESS NOTES
"  {PROVIDER CHARTING PREFERENCE:753923}    Gareth Lincoln is a 44 year old, presenting for the following health issues:  Physical      1/24/2025     9:52 AM   Additional Questions   Roomed by minh   Accompanied by self         1/24/2025     9:52 AM   Patient Reported Additional Medications   Patient reports taking the following new medications see chart     HPI       {ROS Picklists (Optional):848673}        Objective    /83   Pulse 74   Temp 98.5  F (36.9  C) (Oral)   Resp 20   Ht 1.626 m (5' 4\")   Wt 69.4 kg (153 lb)   LMP 11/27/2021   SpO2 98%   BMI 26.26 kg/m    Body mass index is 26.26 kg/m .  Physical Exam   {Exam List (Optional):143365}    {Diagnostic Test Results (Optional):437255}        Signed Electronically by: KYLAH FUENTES PA-C  {Email feedback regarding this note to [email or web address]   :591040}  Answers submitted by the patient for this visit:  Patient Health Questionnaire (Submitted on 1/24/2025)  If you checked off any problems, how difficult have these problems made it for you to do your work, take care of things at home, or get along with other people?: Not difficult at all  PHQ9 TOTAL SCORE: 6    "

## 2025-01-28 ENCOUNTER — ANCILLARY PROCEDURE (OUTPATIENT)
Dept: ULTRASOUND IMAGING | Facility: CLINIC | Age: 45
End: 2025-01-28
Attending: PHYSICIAN ASSISTANT
Payer: COMMERCIAL

## 2025-01-28 ENCOUNTER — HOSPITAL ENCOUNTER (OUTPATIENT)
Dept: MAMMOGRAPHY | Facility: CLINIC | Age: 45
Discharge: HOME | End: 2025-01-28
Attending: PHYSICIAN ASSISTANT
Payer: COMMERCIAL

## 2025-01-28 DIAGNOSIS — R14.0 BLOATED ABDOMEN: ICD-10-CM

## 2025-01-28 DIAGNOSIS — Z12.31 VISIT FOR SCREENING MAMMOGRAM: ICD-10-CM

## 2025-01-28 DIAGNOSIS — E03.9 ACQUIRED HYPOTHYROIDISM: ICD-10-CM

## 2025-01-28 DIAGNOSIS — N83.202 LEFT OVARIAN CYST: Primary | ICD-10-CM

## 2025-01-28 PROCEDURE — 77063 BREAST TOMOSYNTHESIS BI: CPT

## 2025-01-28 PROCEDURE — 76856 US EXAM PELVIC COMPLETE: CPT | Mod: TC | Performed by: STUDENT IN AN ORGANIZED HEALTH CARE EDUCATION/TRAINING PROGRAM

## 2025-01-28 PROCEDURE — 76830 TRANSVAGINAL US NON-OB: CPT | Mod: TC | Performed by: STUDENT IN AN ORGANIZED HEALTH CARE EDUCATION/TRAINING PROGRAM

## 2025-01-28 PROCEDURE — 76536 US EXAM OF HEAD AND NECK: CPT | Mod: TC | Performed by: RADIOLOGY

## 2025-01-29 ENCOUNTER — LAB (OUTPATIENT)
Dept: LAB | Facility: CLINIC | Age: 45
End: 2025-01-29
Payer: COMMERCIAL

## 2025-01-29 DIAGNOSIS — E03.9 ACQUIRED HYPOTHYROIDISM: ICD-10-CM

## 2025-01-29 DIAGNOSIS — E03.9 ACQUIRED HYPOTHYROIDISM: Primary | ICD-10-CM

## 2025-01-29 DIAGNOSIS — R14.0 BLOATED ABDOMEN: ICD-10-CM

## 2025-01-29 DIAGNOSIS — R53.83 FATIGUE, UNSPECIFIED TYPE: ICD-10-CM

## 2025-01-29 DIAGNOSIS — R52 BODY ACHES: ICD-10-CM

## 2025-01-29 DIAGNOSIS — K90.0 CELIAC DISEASE: ICD-10-CM

## 2025-01-29 DIAGNOSIS — Z13.220 LIPID SCREENING: ICD-10-CM

## 2025-01-29 LAB
ALBUMIN SERPL BCG-MCNC: 4.7 G/DL (ref 3.5–5.2)
ALP SERPL-CCNC: 58 U/L (ref 40–150)
ALT SERPL W P-5'-P-CCNC: 26 U/L (ref 0–50)
ANION GAP SERPL CALCULATED.3IONS-SCNC: 10 MMOL/L (ref 7–15)
AST SERPL W P-5'-P-CCNC: 21 U/L (ref 0–45)
BILIRUB SERPL-MCNC: 0.7 MG/DL
BUN SERPL-MCNC: 17.2 MG/DL (ref 6–20)
CALCIUM SERPL-MCNC: 9.1 MG/DL (ref 8.8–10.4)
CHLORIDE SERPL-SCNC: 103 MMOL/L (ref 98–107)
CHOLEST SERPL-MCNC: 176 MG/DL
CREAT SERPL-MCNC: 0.82 MG/DL (ref 0.51–0.95)
EGFRCR SERPLBLD CKD-EPI 2021: 90 ML/MIN/1.73M2
FASTING STATUS PATIENT QL REPORTED: YES
FASTING STATUS PATIENT QL REPORTED: YES
FOLATE SERPL-MCNC: 3.8 NG/ML (ref 4.6–34.8)
GLUCOSE SERPL-MCNC: 106 MG/DL (ref 70–99)
HCO3 SERPL-SCNC: 25 MMOL/L (ref 22–29)
HDLC SERPL-MCNC: 41 MG/DL
LDLC SERPL CALC-MCNC: 116 MG/DL
NONHDLC SERPL-MCNC: 135 MG/DL
POTASSIUM SERPL-SCNC: 4.1 MMOL/L (ref 3.4–5.3)
PROT SERPL-MCNC: 7.4 G/DL (ref 6.4–8.3)
PTH-INTACT SERPL-MCNC: 37 PG/ML (ref 15–65)
SODIUM SERPL-SCNC: 138 MMOL/L (ref 135–145)
T4 FREE SERPL-MCNC: 1.41 NG/DL (ref 0.9–1.7)
TRIGL SERPL-MCNC: 97 MG/DL
TSH SERPL DL<=0.005 MIU/L-ACNC: 7.73 UIU/ML (ref 0.3–4.2)
VIT B12 SERPL-MCNC: 904 PG/ML (ref 232–1245)
VIT D+METAB SERPL-MCNC: 30 NG/ML (ref 20–50)

## 2025-01-29 PROCEDURE — 80053 COMPREHEN METABOLIC PANEL: CPT

## 2025-01-29 PROCEDURE — 84443 ASSAY THYROID STIM HORMONE: CPT

## 2025-01-29 PROCEDURE — 84439 ASSAY OF FREE THYROXINE: CPT

## 2025-01-29 PROCEDURE — 36415 COLL VENOUS BLD VENIPUNCTURE: CPT

## 2025-01-29 PROCEDURE — 84630 ASSAY OF ZINC: CPT | Mod: 90

## 2025-01-29 PROCEDURE — 82746 ASSAY OF FOLIC ACID SERUM: CPT

## 2025-01-29 PROCEDURE — 82607 VITAMIN B-12: CPT

## 2025-01-29 PROCEDURE — 80061 LIPID PANEL: CPT

## 2025-01-29 PROCEDURE — 99000 SPECIMEN HANDLING OFFICE-LAB: CPT

## 2025-01-29 PROCEDURE — 83970 ASSAY OF PARATHORMONE: CPT

## 2025-01-29 PROCEDURE — 82306 VITAMIN D 25 HYDROXY: CPT

## 2025-01-30 ENCOUNTER — ANCILLARY PROCEDURE (OUTPATIENT)
Dept: MAMMOGRAPHY | Facility: CLINIC | Age: 45
End: 2025-01-30
Attending: PHYSICIAN ASSISTANT
Payer: COMMERCIAL

## 2025-01-30 ENCOUNTER — ANCILLARY PROCEDURE (OUTPATIENT)
Dept: ULTRASOUND IMAGING | Facility: CLINIC | Age: 45
End: 2025-01-30
Attending: PHYSICIAN ASSISTANT
Payer: COMMERCIAL

## 2025-01-30 DIAGNOSIS — R92.8 ABNORMAL MAMMOGRAM: ICD-10-CM

## 2025-02-02 DIAGNOSIS — E03.9 ACQUIRED HYPOTHYROIDISM: Primary | ICD-10-CM

## 2025-02-02 RX ORDER — LEVOTHYROXINE SODIUM 137 UG/1
137 TABLET ORAL
Qty: 90 TABLET | Refills: 1 | Status: SHIPPED | OUTPATIENT
Start: 2025-02-02

## 2025-04-23 ENCOUNTER — ANCILLARY PROCEDURE (OUTPATIENT)
Dept: ULTRASOUND IMAGING | Facility: CLINIC | Age: 45
End: 2025-04-23
Attending: PHYSICIAN ASSISTANT
Payer: COMMERCIAL

## 2025-04-23 DIAGNOSIS — N83.202 LEFT OVARIAN CYST: ICD-10-CM

## 2025-04-23 PROCEDURE — 76856 US EXAM PELVIC COMPLETE: CPT | Mod: TC | Performed by: RADIOLOGY

## 2025-04-23 PROCEDURE — 76830 TRANSVAGINAL US NON-OB: CPT | Mod: TC | Performed by: RADIOLOGY

## 2025-04-24 DIAGNOSIS — N83.299 COMPLEX OVARIAN CYST: Primary | ICD-10-CM

## 2025-04-30 NOTE — PATIENT INSTRUCTIONS
If you have labs or imaging done, the results will automatically release in [facility] without an interpretation.  Your health care professional will review those results and send an interpretation with recommendations as soon as possible, but this may be 1-3 business days.    If you have any questions regarding your visit, please contact your care team.     [employer] [facility]: [phone]  [facility] CLINIC HOURS TELEPHONE NUMBER   Gianluca CAREY Mark .      Mari-TOI Chan-TOI Campos-Surgery Scheduler  Makenna-         Monday-[place]  8:00 am-4:00 pm  [facility]00 [facility]  Wednesday-[place] 8:00 am-4:00 pm  Thursday-[place] 8:00 am-4:00 pm    Typical Surgery Day Friday [facility]  [address] [place] [address]  PH: [phone] Fax    Imaging Scheduling all locations  PH: [phone]     [facility]  [address]  [place], [place] [address] [phone]    [facility]  [address] [place] [address]  PH: [phone]     **Surgeries** Our Surgery Schedulers will contact you to schedule. If you do not receive a call within 3 business days, please call [phone].  Urgent Care locations:  [facility]       Monday-Friday   10 am - 8 pm  Saturday and Sunday   9 am - 5 pm   [phone]   If you need a medication refill, please contact your pharmacy. Please allow 3 business days for your refill to be completed.  As always, Thank you for trusting us with your healthcare needs!

## 2025-05-01 ENCOUNTER — OFFICE VISIT (OUTPATIENT)
Dept: OBGYN | Facility: CLINIC | Age: 45
End: 2025-05-01
Attending: PHYSICIAN ASSISTANT
Payer: COMMERCIAL

## 2025-05-01 VITALS
DIASTOLIC BLOOD PRESSURE: 88 MMHG | BODY MASS INDEX: 27.6 KG/M2 | OXYGEN SATURATION: 97 % | SYSTOLIC BLOOD PRESSURE: 131 MMHG | WEIGHT: 160.8 LBS | HEART RATE: 86 BPM

## 2025-05-01 DIAGNOSIS — N80.9 ENDOMETRIOSIS: ICD-10-CM

## 2025-05-01 DIAGNOSIS — N83.299 COMPLEX OVARIAN CYST: Primary | ICD-10-CM

## 2025-05-01 NOTE — PROGRESS NOTES
HPI:   Kimberley is a 44 year old  here for recently found ovarian cyst.  Patient had pelvic ultrasound in 2025 due to some abdominal pain and cramping.    Noted to have 2.7 cm complex left ovarian cyst.  Repeat ultrasound was recently performed and notable to have a 2.6 cm left ovarian cyst.  Patient is noting cyclical like cramping similar to before she had her hysterectomy.  Since her hysterectomy her pain had resolved. Patient does have a history of hysterectomy and endometriosis.  At the time of her hysterectomy in  she was to have endometriosis on the uterus itself as well as the bilateral uterosacral ligaments prior to that had fulguration of endometriosis with surgical findings as noted below.    (10/5/2021) FINDINGS: Uterus was retroverted and normal in overall size and configuration. Both ovaries appeared normal. Within the cul-de-sac, there were several clusters of very dense endometriosis present along the uterosacrals and medial to the uterosacrals as well as some endometriosis along the pelvic sidewalls on both the right and left. This was moderate in its nature and depth.               ROS:   10pt ROS negative except for above      GYNHx:   Patient's last menstrual period was 2021.   S/P Hysterectomy    Last paps:    Lab Results   Component Value Date    PAP NIL 2019    PAP NIL 03/10/2016    PAP NIL 2013       OBHx:  OB History    Para Term  AB Living   3 3 2 1 0 3   SAB IAB Ectopic Multiple Live Births   0 0 0 0 3      # Outcome Date GA Lbr Devendra/2nd Weight Sex Type Anes PTL Lv   3 Term 09/16/10 38w2d 06:08 3.629 kg (8 lb) F IVD EPI  MUNA      Name: Sanjana      Apgar1: 8  Apgar5: 9   2 Term 06 38w0d 01:07 3.118 kg (6 lb 14 oz) F IVD   MUNA      Birth Comments: IVD-PIH, severe anemia, increasing hip pain       Name: Marina       Apgar1: 8  Apgar5: 9   1  05 35w0d 13:00 2.92 kg (6 lb 7 oz) M VACUUM   MUNA      Birth Comments: vacuum-rhogam       Name: Bryce        PSH:   Past Surgical History:   Procedure Laterality Date    BIOPSY  2014    Liver    ENT SURGERY  2000    Thyroidectomy    ESOPHAGOSCOPY, GASTROSCOPY, DUODENOSCOPY (EGD), COMBINED  11/01/2010    EGD    LAPAROSCOPIC ABLATION ENDOMETRIOSIS  10/05/2021    Procedure: cautery of endometriosis;  Surgeon: Tasha Dewey MD;  Location: [facility] OR    LAPAROSCOPIC ASSISTED HYSTERECTOMY VAGINAL N/A 12/02/2021    LAVH-bilat salpingectomy, removal of endometriosis    LAPAROSCOPY DIAGNOSTIC (GYN) N/A 10/05/2021    Procedure: Diagnostic laparoscopy;  Surgeon: Tasha Dewey MD;  Location: [facility] OR    THYROIDECTOMY   2002    S/p thyroidectomy 2002    ZZC HIP ARTHROSCOPY, DX  06/2008    (R) Anterior superior labral debridement.        PMH:  Past Medical History:   Diagnosis Date    Anemia 06/17/2010    Better after diagnosis of celiac disease      Anemia, unspecified     with pregnancy 6/06    Arthritis 2006    Right hip    Celiac disease     Dysmenorrhea 02/01/2012    Endometriosis determined by laparoscopy 10/05/2021    S/p LAPAROSCOPIC ASSISTED VAGINAL HYSTERECTOMY, bilat salpingectomy, excision of endometriosis 12/21      GERD (gastroesophageal reflux disease) 01/30/2014    Papanicolaou smear of cervix with low grade squamous intraepithelial lesion (LGSIL) 07/2009    Colpo negative    POTS (postural orthostatic tachycardia syndrome) 01/24/2025    Right hip labral tear 08/24/2006    Unspecified closed fracture of ankle     Left foot    Unspecified disorder of thyroid 10/20/2002    Thyroid disease, goiter nodules, S/p thyroidectomy 2002        MEDs   Current Outpatient Medications   Medication Sig Dispense Refill    levothyroxine (SYNTHROID/LEVOTHROID) 137 MCG tablet Take 1 tablet (137 mcg) by mouth every morning (before breakfast). 90 tablet 1    ALPRAZolam (XANAX) 0.25 MG tablet Take one tab 30 minutes before flying for anxiety related to flying. Do NOT drink alcohol while taking this  medication. (Patient not taking: Reported on 5/1/2025) 5 tablet 0    busPIRone (BUSPAR) 5 MG tablet Take 1 tablet (5 mg) by mouth 3 times daily. (Patient not taking: Reported on 5/1/2025) 30 tablet 0     No current facility-administered medications for this visit.       Allergies:  Allergies   Allergen Reactions    Gluten Other (See Comments)     Celiac disease    Macrobid [Nitrofuran Derivatives] Nausea and Vomiting    Zithromax [Azithromycin Dihydrate] Nausea and Vomiting       FamHx:  Family History   Problem Relation Age of Onset    Gastrointestinal Disease Mother         celiac    Liver Disease Mother         PVC    Gallbladder Disease Mother     Hypertension Father     Arthritis Father     Heart Disease Father         cardimyopthy age 60, arrythmias    Lipids Father     Neurologic Disorder Sister         brain tumor    Gallbladder Disease Sister     Diabetes Maternal Grandmother     Alcohol/Drug Maternal Grandmother     Gastrointestinal Disease Paternal Grandmother         IBS    Hypertension Paternal Grandmother     Cerebrovascular Disease Paternal Grandmother     Alzheimer Disease Paternal Grandmother     C.A.D. No family hx of     Breast Cancer No family hx of     Cancer - colorectal No family hx of     Anesthesia Reaction No family hx of     Bleeding Disorder No family hx of        SocHx:  Social History     Socioeconomic History    Marital status:      Spouse name: Not on file    Number of children: 2    Years of education: Not on file    Highest education level: Not on file   Occupational History    Occupation:      Employer: [employer]   Tobacco Use    Smoking status: Never     Passive exposure: Never    Smokeless tobacco: Never   Vaping Use    Vaping status: Never Used   Substance and Sexual Activity    Alcohol use: Yes     Comment: rare    Drug use: No    Sexual activity: Yes     Partners: Male     Birth control/protection: Male Surgical, Female Surgical     Comment: Vasectomy    Other Topics Concern    Parent/sibling w/ CABG, MI or angioplasty before 65F 55M? No     Service Not Asked    Blood Transfusions Not Asked    Caffeine Concern Not Asked    Occupational Exposure Not Asked    Hobby Hazards Not Asked    Sleep Concern Not Asked    Stress Concern Not Asked    Weight Concern Not Asked    Special Diet Not Asked    Back Care Not Asked    Exercise Yes     Comment: ride horses    Bike Helmet Not Asked    Seat Belt Not Asked    Self-Exams Not Asked   Social History Narrative    Not on file     Social Drivers of Health     Financial Resource Strain: Low Risk  (1/24/2025)    Financial Resource Strain     Within the past 12 months, have you or your family members you live with been unable to get utilities (heat, electricity) when it was really needed?: No   Food Insecurity: Low Risk  (1/24/2025)    Food Insecurity     Within the past 12 months, did you worry that your food would run out before you got money to buy more?: No     Within the past 12 months, did the food you bought just not last and you didn t have money to get more?: No   Transportation Needs: Low Risk  (1/24/2025)    Transportation Needs     Within the past 12 months, has lack of transportation kept you from medical appointments, getting your medicines, non-medical meetings or appointments, work, or from getting things that you need?: No   Physical Activity: Insufficiently Active (1/24/2025)    Exercise Vital Sign     Days of Exercise per Week: 7 days     Minutes of Exercise per Session: 20 min   Stress: Stress Concern Present (1/24/2025)    [employer] - Occupational Stress Questionnaire     Feeling of Stress : Rather much   Social Connections: Unknown (1/24/2025)    Social Connection and Isolation Panel [NHANES]     Frequency of Communication with Friends and Family: Not on file     Frequency of Social Gatherings with Friends and Family: Three times a week     Attends Confucianist Services: Not  on file     Active Member of Clubs or Organizations: Not on file     Attends Club or Organization Meetings: Not on file     Marital Status: Not on file   Interpersonal Safety: Low Risk  (1/24/2025)    Interpersonal Safety     Do you feel physically and emotionally safe where you currently live?: Yes     Within the past 12 months, have you been hit, slapped, kicked or otherwise physically hurt by someone?: No     Within the past 12 months, have you been humiliated or emotionally abused in other ways by your partner or ex-partner?: No   Housing Stability: Low Risk  (1/24/2025)    Housing Stability     Do you have housing? : Yes     Are you worried about losing your housing?: No             PE:   /88   Pulse 86   Wt 72.9 kg (160 lb 12.8 oz)   LMP 11/27/2021   SpO2 97%   BMI 27.60 kg/m    Body mass index is 27.6 kg/m .    General Appearance:  healthy, alert, active, no distress  HEENT: NC/AT  CV: well perfused  Lungs: non-labored breathing  Breast: not performed  Neuro: normal gait, balance  Skin: no lesions, visible rashes/bruising  Psych: appropriate mood/affect  Pelvic: deferred      RADS  Narrative & Impression   EXAM: US PELVIC TRANSABDOMINAL AND TRANSVAGINAL  LOCATION: [facility]  DATE: 4/23/2025     INDICATION: Left ovarian cyst.  COMPARISON: 1/28/2025.  TECHNIQUE: Transabdominal scans were performed. Endovaginal ultrasound was performed to better visualize the adnexa.     FINDINGS:     UTERUS: Surgically absent.      RIGHT OVARY: 3.1 x 2.2 x 2.1 cm. 1.9 cm simple appearing right ovarian cyst most compatible with a dominant follicle. Otherwise, negative.     LEFT OVARY: 3.1 x 1.8 x 1.8 cm. Complex cyst in the left ovary measuring 2.6 x 1.9 x 1.7 cm. Previously there was a complex cyst in the left ovary measuring 2.7 x 2.3 x 2.0 cm. Uncertain if this represents persistence of the same complex cyst or a new   complex cyst.     No significant free fluid.                                                                       IMPRESSION:  1.  Complex cyst in the left ovary measuring up to 2.6 cm. This is similar in size and appearance to a complex cyst seen on 1/28/2025. Uncertain if this represents persistence of the same lesion or a new complex cyst. Suggest additional follow-up   ultrasound in 8-10 weeks for reevaluation.     2.  1.9 cm simple cyst in the right ovary compatible with a dominant follicle.     3.  Hysterectomy.         Narrative & Impression   EXAM: US PELVIC TRANSABDOMINAL AND TRANSVAGINAL  LOCATION: [facility]  DATE: 1/28/2025     INDICATION:  Bloated abdomen  COMPARISON: Pelvic ultrasound 3/24/2021  TECHNIQUE: Transabdominal scans were performed. Endovaginal ultrasound was performed to better visualize the adnexa.     FINDINGS:     UTERUS: Surgically absent.     RIGHT OVARY: 3 x 1.4 x 1.4 cm. Normal.     LEFT OVARY: 3.9 x 3.8 x 2.3 cm. Heterogeneous cystic structure measuring 2.7 x 2.3 x 2 cm.     No significant free fluid.                                                                      IMPRESSION:  1.  Hysterectomy.  2.  Probable involuting left ovarian hemorrhagic/functional cyst measuring 2.7 cm.           LABS  None for review         A/P:      ICD-10-CM    1. Complex ovarian cyst  N83.299 Ob/Gyn  Referral     MR Endometriosis w/o & w Contrast      2. Endometriosis  N80.9 MR Endometriosis w/o & w Contrast      40-year-old female with history of stage IV endometriosis, now with cyclical pelvic pain and complex left ovarian cyst.  Very reviewed with patient that her complex cyst has not grown in size over several months and therefore very low risk of malignancy.  Cyst was likely hemorrhagic versus endometrioma.  Reviewed management options for her pain and the cyst to include progesterone therapy, MRI to characterize the cyst, surgery with plan for cystectomy and fulguration of endometriosis versus oophorectomy.  At this time patient would like  to complete pelvic MRI which was ordered today.  If noting endometrioma would recommend management via progesterone therapy or surgical excision if patient desires.  All questions were answered.  Patient agreement with plan of care.          30 min spent on the date of the encounter in chart review, patient visit, review of tests, documentation and/or discussion with other providers about the issues documented above.     Gianluca Flores DO, FACOG  Gynecologic Surgeon and Obstetrician

## 2025-06-19 ENCOUNTER — TRANSCRIBE ORDERS (OUTPATIENT)
Dept: OTHER | Age: 45
End: 2025-06-19

## 2025-06-19 DIAGNOSIS — R10.2 PELVIC PAIN IN FEMALE: Primary | ICD-10-CM

## 2025-08-05 DIAGNOSIS — E03.9 ACQUIRED HYPOTHYROIDISM: ICD-10-CM

## 2025-08-06 RX ORDER — LEVOTHYROXINE SODIUM 137 MCG
TABLET ORAL
Qty: 90 TABLET | Refills: 0 | Status: SHIPPED | OUTPATIENT
Start: 2025-08-06

## 2025-08-21 ENCOUNTER — APPOINTMENT (OUTPATIENT)
Dept: GENERAL RADIOLOGY | Facility: CLINIC | Age: 45
End: 2025-08-21
Attending: FAMILY MEDICINE
Payer: COMMERCIAL

## 2025-08-21 ENCOUNTER — HOSPITAL ENCOUNTER (EMERGENCY)
Facility: CLINIC | Age: 45
Discharge: HOME | End: 2025-08-21
Attending: FAMILY MEDICINE
Payer: COMMERCIAL

## 2025-08-21 VITALS
OXYGEN SATURATION: 97 % | DIASTOLIC BLOOD PRESSURE: 71 MMHG | HEART RATE: 64 BPM | SYSTOLIC BLOOD PRESSURE: 108 MMHG | TEMPERATURE: 98.8 F | RESPIRATION RATE: 17 BRPM

## 2025-08-21 DIAGNOSIS — R07.9 CHEST PAIN, UNSPECIFIED TYPE: Primary | ICD-10-CM

## 2025-08-21 LAB
ALBUMIN SERPL BCG-MCNC: 4.5 G/DL (ref 3.5–5.2)
ALP SERPL-CCNC: 54 U/L (ref 40–150)
ALT SERPL W P-5'-P-CCNC: 39 U/L (ref 0–50)
ANION GAP SERPL CALCULATED.3IONS-SCNC: 11 MMOL/L (ref 7–15)
AST SERPL W P-5'-P-CCNC: 21 U/L (ref 0–45)
ATRIAL RATE - MUSE: 78 BPM
BASOPHILS # BLD AUTO: 0.05 10E3/UL (ref 0–0.2)
BASOPHILS NFR BLD AUTO: 0.8 %
BILIRUB SERPL-MCNC: 0.7 MG/DL
BUN SERPL-MCNC: 9.6 MG/DL (ref 6–20)
CALCIUM SERPL-MCNC: 9 MG/DL (ref 8.8–10.4)
CHLORIDE SERPL-SCNC: 102 MMOL/L (ref 98–107)
CREAT SERPL-MCNC: 0.83 MG/DL (ref 0.51–0.95)
D DIMER PPP FEU-MCNC: <0.27 UG/ML FEU (ref 0–0.5)
DIASTOLIC BLOOD PRESSURE - MUSE: NORMAL MMHG
EGFRCR SERPLBLD CKD-EPI 2021: 88 ML/MIN/1.73M2
EOSINOPHIL # BLD AUTO: 0.07 10E3/UL (ref 0–0.7)
EOSINOPHIL NFR BLD AUTO: 1.1 %
ERYTHROCYTE [DISTWIDTH] IN BLOOD BY AUTOMATED COUNT: 12.9 % (ref 10–15)
GLUCOSE SERPL-MCNC: 129 MG/DL (ref 70–99)
HCO3 SERPL-SCNC: 24 MMOL/L (ref 22–29)
HCT VFR BLD AUTO: 43.1 % (ref 35–47)
HGB BLD-MCNC: 14.7 G/DL (ref 11.7–15.7)
HOLD SPECIMEN: NORMAL
HOLD SPECIMEN: NORMAL
IMM GRANULOCYTES # BLD: <0.03 10E3/UL
IMM GRANULOCYTES NFR BLD: 0.3 %
INTERPRETATION ECG - MUSE: NORMAL
LIPASE SERPL-CCNC: 35 U/L (ref 13–60)
LYMPHOCYTES # BLD AUTO: 1.62 10E3/UL (ref 0.8–5.3)
LYMPHOCYTES NFR BLD AUTO: 26.1 %
MCH RBC QN AUTO: 29.1 PG (ref 26.5–33)
MCHC RBC AUTO-ENTMCNC: 34.1 G/DL (ref 31.5–36.5)
MCV RBC AUTO: 85.2 FL (ref 78–100)
MONOCYTES # BLD AUTO: 0.59 10E3/UL (ref 0–1.3)
MONOCYTES NFR BLD AUTO: 9.5 %
NEUTROPHILS # BLD AUTO: 3.85 10E3/UL (ref 1.6–8.3)
NEUTROPHILS NFR BLD AUTO: 62.2 %
NRBC # BLD AUTO: <0.03 10E3/UL
NRBC BLD AUTO-RTO: 0 /100
P AXIS - MUSE: 12 DEGREES
PLATELET # BLD AUTO: 221 10E3/UL (ref 150–450)
POTASSIUM SERPL-SCNC: 4.3 MMOL/L (ref 3.4–5.3)
PR INTERVAL - MUSE: 172 MS
PROT SERPL-MCNC: 7.2 G/DL (ref 6.4–8.3)
QRS DURATION - MUSE: 80 MS
QT - MUSE: 384 MS
QTC - MUSE: 437 MS
R AXIS - MUSE: 22 DEGREES
RBC # BLD AUTO: 5.06 10E6/UL (ref 3.8–5.2)
SODIUM SERPL-SCNC: 137 MMOL/L (ref 135–145)
SYSTOLIC BLOOD PRESSURE - MUSE: NORMAL MMHG
T AXIS - MUSE: 33 DEGREES
TROPONIN T SERPL HS-MCNC: <6 NG/L
VENTRICULAR RATE- MUSE: 78 BPM
WBC # BLD AUTO: 6.2 10E3/UL (ref 4–11)

## 2025-08-21 PROCEDURE — 99285 EMERGENCY DEPT VISIT HI MDM: CPT | Mod: 25 | Performed by: FAMILY MEDICINE

## 2025-08-21 PROCEDURE — 85025 COMPLETE CBC W/AUTO DIFF WBC: CPT | Performed by: FAMILY MEDICINE

## 2025-08-21 PROCEDURE — 36415 COLL VENOUS BLD VENIPUNCTURE: CPT | Performed by: FAMILY MEDICINE

## 2025-08-21 PROCEDURE — 80053 COMPREHEN METABOLIC PANEL: CPT | Performed by: FAMILY MEDICINE

## 2025-08-21 PROCEDURE — 85379 FIBRIN DEGRADATION QUANT: CPT | Performed by: FAMILY MEDICINE

## 2025-08-21 PROCEDURE — 84484 ASSAY OF TROPONIN QUANT: CPT | Performed by: FAMILY MEDICINE

## 2025-08-21 PROCEDURE — 83690 ASSAY OF LIPASE: CPT | Performed by: FAMILY MEDICINE

## 2025-08-21 PROCEDURE — 93005 ELECTROCARDIOGRAM TRACING: CPT

## 2025-08-21 PROCEDURE — 71046 X-RAY EXAM CHEST 2 VIEWS: CPT

## 2025-08-21 ASSESSMENT — ACTIVITIES OF DAILY LIVING (ADL)
ADLS_ACUITY_SCORE: 41

## 2025-08-21 ASSESSMENT — COLUMBIA-SUICIDE SEVERITY RATING SCALE - C-SSRS
6. HAVE YOU EVER DONE ANYTHING, STARTED TO DO ANYTHING, OR PREPARED TO DO ANYTHING TO END YOUR LIFE?: NO
1. IN THE PAST MONTH, HAVE YOU WISHED YOU WERE DEAD OR WISHED YOU COULD GO TO SLEEP AND NOT WAKE UP?: NO
2. HAVE YOU ACTUALLY HAD ANY THOUGHTS OF KILLING YOURSELF IN THE PAST MONTH?: NO

## 2025-08-23 LAB
ATRIAL RATE - MUSE: 78 BPM
DIASTOLIC BLOOD PRESSURE - MUSE: NORMAL MMHG
INTERPRETATION ECG - MUSE: NORMAL
P AXIS - MUSE: 12 DEGREES
PR INTERVAL - MUSE: 172 MS
QRS DURATION - MUSE: 80 MS
QT - MUSE: 384 MS
QTC - MUSE: 437 MS
R AXIS - MUSE: 22 DEGREES
SYSTOLIC BLOOD PRESSURE - MUSE: NORMAL MMHG
T AXIS - MUSE: 33 DEGREES
VENTRICULAR RATE- MUSE: 78 BPM

## (undated) DEVICE — DECANTER VIAL 2006S

## (undated) DEVICE — GLOVE PROTEXIS W/NEU-THERA 6.5  2D73TE65

## (undated) DEVICE — DRAPE POUCH INSTRUMENT 3 POCKET 1018L

## (undated) DEVICE — ESU LIGASURE LAPAROSCPC L-HOOK SEALER/DVDR 5MM-44CM LF5644

## (undated) DEVICE — GOWN LG DISP 9515

## (undated) DEVICE — ADH SKIN CLOSURE PREMIERPRO EXOFIN 1.0ML 3470

## (undated) DEVICE — SU VICRYL 0 CT-2 CR 8X18" J727D

## (undated) DEVICE — ENDO TROCAR SHIELDED BLADED KII Z-THRD 05X100MM CTB03

## (undated) DEVICE — ESU HOLSTER PLASTIC DISP E2400

## (undated) DEVICE — ENDO TROCAR FIRST ENTRY KII FIOS ADV FIX 05X100MM CFF03

## (undated) DEVICE — TUBING INSUFFLATION W/FILTER CPC TO LUER 620-030-301

## (undated) DEVICE — CATH TRAY FOLEY SURESTEP 16FR W/URINE MTR STATLK LF A303416A

## (undated) DEVICE — ESU LIGASURE IMPACT OPEN SEALER/DVDR CVD LG JAW LF4418

## (undated) DEVICE — SU MONOCRYL 4-0 PS-2 18" UND Y496G

## (undated) DEVICE — PREP SKIN SCRUB TRAY 4461A

## (undated) DEVICE — PAD PERI INDIV WRAP 11" 2022

## (undated) DEVICE — SOL NACL 0.9% IRRIG 1000ML BOTTLE 07138-09

## (undated) DEVICE — ENDO TROCAR SLEEVE KII ADV FIXATION 05X100MM CFS02

## (undated) DEVICE — ANTIFOG SOLUTION W/FOAM PAD 31142527

## (undated) DEVICE — SOL NACL 0.9% INJ 1000ML BAG 07983-09

## (undated) DEVICE — SYR 10ML LL W/O NDL

## (undated) DEVICE — SU VICRYL 0 CT-1 36" J946H

## (undated) DEVICE — TUBING SUCTION MEDI-VAC 1/4"X20' N620A

## (undated) DEVICE — PACK LAPAROSCOPY/PELVISCOPY STD

## (undated) DEVICE — SUCTION IRR STRYKERFLOW II W/TIP 250-070-520

## (undated) DEVICE — PACK LAPAROTOMY CUSTOM LAKES

## (undated) DEVICE — PREP CHLORHEXIDINE 4% 4OZ (HIBICLENS) 57504

## (undated) DEVICE — SUCTION TIP YANKAUER STR K87

## (undated) DEVICE — UTERINE MANIPULATOR HUMI KRONER 6003

## (undated) DEVICE — ESU LIGASURE MARYLAND LAPAROSCOPIC SLR/DVDR 5MMX37CM LF1937

## (undated) DEVICE — SOL WATER IRRIG 1000ML BOTTLE 07139-09

## (undated) RX ORDER — GABAPENTIN 300 MG/1
CAPSULE ORAL
Status: DISPENSED
Start: 2021-10-05

## (undated) RX ORDER — FENTANYL CITRATE 50 UG/ML
INJECTION, SOLUTION INTRAMUSCULAR; INTRAVENOUS
Status: DISPENSED
Start: 2021-10-05

## (undated) RX ORDER — HYDROXYZINE HYDROCHLORIDE 25 MG/1
TABLET, FILM COATED ORAL
Status: DISPENSED
Start: 2021-10-05

## (undated) RX ORDER — BUPIVACAINE HYDROCHLORIDE AND EPINEPHRINE 5; 5 MG/ML; UG/ML
INJECTION, SOLUTION EPIDURAL; INTRACAUDAL; PERINEURAL
Status: DISPENSED
Start: 2021-12-02

## (undated) RX ORDER — PHENAZOPYRIDINE HYDROCHLORIDE 200 MG/1
TABLET, FILM COATED ORAL
Status: DISPENSED
Start: 2021-12-02

## (undated) RX ORDER — CEFAZOLIN SODIUM 2 G/100ML
INJECTION, SOLUTION INTRAVENOUS
Status: DISPENSED
Start: 2021-10-05

## (undated) RX ORDER — TRANEXAMIC ACID 10 MG/ML
INJECTION, SOLUTION INTRAVENOUS
Status: DISPENSED
Start: 2021-12-02

## (undated) RX ORDER — ONDANSETRON 2 MG/ML
INJECTION INTRAMUSCULAR; INTRAVENOUS
Status: DISPENSED
Start: 2021-12-02

## (undated) RX ORDER — BUPIVACAINE HYDROCHLORIDE AND EPINEPHRINE 2.5; 5 MG/ML; UG/ML
INJECTION, SOLUTION EPIDURAL; INFILTRATION; INTRACAUDAL; PERINEURAL
Status: DISPENSED
Start: 2021-10-05

## (undated) RX ORDER — MAGNESIUM SULFATE HEPTAHYDRATE 40 MG/ML
INJECTION, SOLUTION INTRAVENOUS
Status: DISPENSED
Start: 2021-10-05

## (undated) RX ORDER — PROPOFOL 10 MG/ML
INJECTION, EMULSION INTRAVENOUS
Status: DISPENSED
Start: 2021-12-02

## (undated) RX ORDER — CEFAZOLIN SODIUM 2 G/100ML
INJECTION, SOLUTION INTRAVENOUS
Status: DISPENSED
Start: 2021-12-02

## (undated) RX ORDER — OXYCODONE HYDROCHLORIDE 5 MG/1
TABLET ORAL
Status: DISPENSED
Start: 2021-10-05

## (undated) RX ORDER — ACETAMINOPHEN 325 MG/1
TABLET ORAL
Status: DISPENSED
Start: 2021-10-05

## (undated) RX ORDER — ACETAMINOPHEN 325 MG/1
TABLET ORAL
Status: DISPENSED
Start: 2021-12-02

## (undated) RX ORDER — OXYCODONE HCL 10 MG/1
TABLET, FILM COATED, EXTENDED RELEASE ORAL
Status: DISPENSED
Start: 2021-12-02

## (undated) RX ORDER — PROPOFOL 10 MG/ML
INJECTION, EMULSION INTRAVENOUS
Status: DISPENSED
Start: 2021-10-05

## (undated) RX ORDER — OXYCODONE HCL 10 MG/1
TABLET, FILM COATED, EXTENDED RELEASE ORAL
Status: DISPENSED
Start: 2021-10-05

## (undated) RX ORDER — FENTANYL CITRATE 50 UG/ML
INJECTION, SOLUTION INTRAMUSCULAR; INTRAVENOUS
Status: DISPENSED
Start: 2021-12-02

## (undated) RX ORDER — DEXAMETHASONE SODIUM PHOSPHATE 10 MG/ML
INJECTION, SOLUTION INTRAMUSCULAR; INTRAVENOUS
Status: DISPENSED
Start: 2021-12-02

## (undated) RX ORDER — GABAPENTIN 300 MG/1
CAPSULE ORAL
Status: DISPENSED
Start: 2021-12-02